# Patient Record
Sex: MALE | Race: WHITE | NOT HISPANIC OR LATINO | Employment: OTHER | URBAN - NONMETROPOLITAN AREA
[De-identification: names, ages, dates, MRNs, and addresses within clinical notes are randomized per-mention and may not be internally consistent; named-entity substitution may affect disease eponyms.]

---

## 2023-10-20 ENCOUNTER — APPOINTMENT (INPATIENT)
Dept: NON INVASIVE DIAGNOSTICS | Facility: HOSPITAL | Age: 83
DRG: 291 | End: 2023-10-20
Payer: COMMERCIAL

## 2023-10-20 ENCOUNTER — APPOINTMENT (EMERGENCY)
Dept: RADIOLOGY | Facility: HOSPITAL | Age: 83
DRG: 291 | End: 2023-10-20
Payer: COMMERCIAL

## 2023-10-20 ENCOUNTER — APPOINTMENT (EMERGENCY)
Dept: CT IMAGING | Facility: HOSPITAL | Age: 83
DRG: 291 | End: 2023-10-20
Payer: COMMERCIAL

## 2023-10-20 ENCOUNTER — APPOINTMENT (INPATIENT)
Dept: CT IMAGING | Facility: HOSPITAL | Age: 83
DRG: 291 | End: 2023-10-20
Payer: COMMERCIAL

## 2023-10-20 ENCOUNTER — APPOINTMENT (INPATIENT)
Dept: RADIOLOGY | Facility: HOSPITAL | Age: 83
DRG: 291 | End: 2023-10-20
Payer: COMMERCIAL

## 2023-10-20 ENCOUNTER — HOSPITAL ENCOUNTER (INPATIENT)
Facility: HOSPITAL | Age: 83
LOS: 7 days | Discharge: HOSPICE | DRG: 291 | End: 2023-10-27
Attending: EMERGENCY MEDICINE | Admitting: INTERNAL MEDICINE
Payer: COMMERCIAL

## 2023-10-20 DIAGNOSIS — K70.31 ALCOHOLIC CIRRHOSIS OF LIVER WITH ASCITES (HCC): ICD-10-CM

## 2023-10-20 DIAGNOSIS — Z71.89 GOALS OF CARE, COUNSELING/DISCUSSION: ICD-10-CM

## 2023-10-20 DIAGNOSIS — I48.91 ATRIAL FIBRILLATION WITH RVR (HCC): ICD-10-CM

## 2023-10-20 DIAGNOSIS — R09.02 HYPOXIA: ICD-10-CM

## 2023-10-20 DIAGNOSIS — J96.01 ACUTE RESPIRATORY FAILURE WITH HYPOXIA (HCC): ICD-10-CM

## 2023-10-20 DIAGNOSIS — G93.41 ACUTE METABOLIC ENCEPHALOPATHY: ICD-10-CM

## 2023-10-20 DIAGNOSIS — I50.41 ACUTE COMBINED SYSTOLIC AND DIASTOLIC CONGESTIVE HEART FAILURE (HCC): ICD-10-CM

## 2023-10-20 DIAGNOSIS — I48.91 ATRIAL FIBRILLATION WITH RAPID VENTRICULAR RESPONSE (HCC): ICD-10-CM

## 2023-10-20 DIAGNOSIS — M86.671 OTHER CHRONIC OSTEOMYELITIS OF RIGHT FOOT (HCC): ICD-10-CM

## 2023-10-20 DIAGNOSIS — R06.03 RESPIRATORY DISTRESS: ICD-10-CM

## 2023-10-20 DIAGNOSIS — I50.9 CHF (CONGESTIVE HEART FAILURE) (HCC): ICD-10-CM

## 2023-10-20 DIAGNOSIS — R60.1 ANASARCA: Primary | ICD-10-CM

## 2023-10-20 PROBLEM — E11.29 TYPE 2 DIABETES MELLITUS WITH RENAL COMPLICATION (HCC): Status: ACTIVE | Noted: 2023-10-20

## 2023-10-20 PROBLEM — N18.30 STAGE 3 CHRONIC KIDNEY DISEASE (HCC): Status: ACTIVE | Noted: 2023-10-20

## 2023-10-20 PROBLEM — D49.4 BLADDER TUMOR: Status: ACTIVE | Noted: 2023-10-20

## 2023-10-20 PROBLEM — L97.519 RIGHT FOOT ULCER (HCC): Status: ACTIVE | Noted: 2023-10-20

## 2023-10-20 LAB
2HR DELTA HS TROPONIN: -19 NG/L
2HR DELTA HS TROPONIN: -60 NG/L
ALBUMIN SERPL BCP-MCNC: 3.8 G/DL (ref 3.5–5)
ALP SERPL-CCNC: 105 U/L (ref 34–104)
ALT SERPL W P-5'-P-CCNC: 15 U/L (ref 7–52)
AMMONIA PLAS-SCNC: 24 UMOL/L (ref 18–72)
ANION GAP SERPL CALCULATED.3IONS-SCNC: 12 MMOL/L
AORTIC ROOT: 3.7 CM
AORTIC VALVE MEAN VELOCITY: 6.2 M/S
APICAL FOUR CHAMBER EJECTION FRACTION: 41 %
APTT PPP: 25 SECONDS (ref 23–37)
APTT PPP: 26 SECONDS (ref 23–37)
ASCENDING AORTA: 3.4 CM
AST SERPL W P-5'-P-CCNC: 26 U/L (ref 13–39)
AV AREA BY CONTINUOUS VTI: 3.6 CM2
AV AREA PEAK VELOCITY: 2.1 CM2
AV LVOT MEAN GRADIENT: 0 MMHG
AV LVOT PEAK GRADIENT: 1 MMHG
AV MEAN GRADIENT: 2 MMHG
AV PEAK GRADIENT: 3 MMHG
AV VALVE AREA: 3.59 CM2
AV VELOCITY RATIO: 0.52
BACTERIA UR QL AUTO: ABNORMAL /HPF
BASE EX.OXY STD BLDV CALC-SCNC: 77.2 % (ref 60–80)
BASE EXCESS BLDV CALC-SCNC: -2.6 MMOL/L
BASOPHILS # BLD AUTO: 0.02 THOUSANDS/ÂΜL (ref 0–0.1)
BASOPHILS NFR BLD AUTO: 0 % (ref 0–1)
BILIRUB SERPL-MCNC: 1.48 MG/DL (ref 0.2–1)
BILIRUB UR QL STRIP: ABNORMAL
BNP SERPL-MCNC: 3855 PG/ML (ref 0–100)
BUN SERPL-MCNC: 44 MG/DL (ref 5–25)
CALCIUM SERPL-MCNC: 9.2 MG/DL (ref 8.4–10.2)
CARDIAC TROPONIN I PNL SERPL HS: 110 NG/L
CARDIAC TROPONIN I PNL SERPL HS: 123 NG/L
CARDIAC TROPONIN I PNL SERPL HS: 63 NG/L
CARDIAC TROPONIN I PNL SERPL HS: 91 NG/L
CHLORIDE SERPL-SCNC: 102 MMOL/L (ref 96–108)
CLARITY UR: CLEAR
CO2 SERPL-SCNC: 25 MMOL/L (ref 21–32)
COLOR UR: YELLOW
CREAT SERPL-MCNC: 1.88 MG/DL (ref 0.6–1.3)
DOP CALC AO PEAK VEL: 0.93 M/S
DOP CALC AO VTI: 14.41 CM
DOP CALC LVOT AREA: 4.15 CM2
DOP CALC LVOT CARDIAC INDEX: 2.03 L/MIN/M2
DOP CALC LVOT CARDIAC OUTPUT: 4.6 L/MIN
DOP CALC LVOT DIAMETER: 2.3 CM
DOP CALC LVOT PEAK VEL VTI: 12.46 CM
DOP CALC LVOT PEAK VEL: 0.48 M/S
DOP CALC LVOT STROKE INDEX: 22.9 ML/M2
DOP CALC LVOT STROKE VOLUME: 51.74
EOSINOPHIL # BLD AUTO: 0.15 THOUSAND/ÂΜL (ref 0–0.61)
EOSINOPHIL NFR BLD AUTO: 2 % (ref 0–6)
ERYTHROCYTE [DISTWIDTH] IN BLOOD BY AUTOMATED COUNT: 16.4 % (ref 11.6–15.1)
ERYTHROCYTE [DISTWIDTH] IN BLOOD BY AUTOMATED COUNT: 16.4 % (ref 11.6–15.1)
EST. AVERAGE GLUCOSE BLD GHB EST-MCNC: 169 MG/DL
FLUAV RNA RESP QL NAA+PROBE: NEGATIVE
FLUBV RNA RESP QL NAA+PROBE: NEGATIVE
FRACTIONAL SHORTENING: 5 (ref 28–44)
GFR SERPL CREATININE-BSD FRML MDRD: 31 ML/MIN/1.73SQ M
GLUCOSE SERPL-MCNC: 145 MG/DL (ref 65–140)
GLUCOSE SERPL-MCNC: 154 MG/DL (ref 65–140)
GLUCOSE SERPL-MCNC: 163 MG/DL (ref 65–140)
GLUCOSE SERPL-MCNC: 190 MG/DL (ref 65–140)
GLUCOSE UR STRIP-MCNC: NEGATIVE MG/DL
HBA1C MFR BLD: 7.5 %
HCO3 BLDV-SCNC: 22.6 MMOL/L (ref 24–30)
HCT VFR BLD AUTO: 51.1 % (ref 36.5–49.3)
HCT VFR BLD AUTO: 55.7 % (ref 36.5–49.3)
HGB BLD-MCNC: 15.8 G/DL (ref 12–17)
HGB BLD-MCNC: 17.1 G/DL (ref 12–17)
HGB UR QL STRIP.AUTO: NEGATIVE
HYALINE CASTS #/AREA URNS LPF: ABNORMAL /LPF
IMM GRANULOCYTES # BLD AUTO: 0.05 THOUSAND/UL (ref 0–0.2)
IMM GRANULOCYTES NFR BLD AUTO: 1 % (ref 0–2)
INR PPP: 1.24 (ref 0.84–1.19)
INR PPP: 1.35 (ref 0.84–1.19)
INTERVENTRICULAR SEPTUM IN DIASTOLE (PARASTERNAL SHORT AXIS VIEW): 0.9 CM
INTERVENTRICULAR SEPTUM: 0.9 CM (ref 0.6–1.1)
KETONES UR STRIP-MCNC: NEGATIVE MG/DL
LAAS-AP2: 25 CM2
LAAS-AP4: 22.8 CM2
LACTATE SERPL-SCNC: 2.2 MMOL/L (ref 0.5–2)
LACTATE SERPL-SCNC: 4 MMOL/L (ref 0.5–2)
LEFT ATRIUM SIZE: 5.7 CM
LEFT ATRIUM VOLUME (MOD BIPLANE): 80 ML
LEFT ATRIUM VOLUME INDEX (MOD BIPLANE): 35.2 ML/M2
LEFT INTERNAL DIMENSION IN SYSTOLE: 5.3 CM (ref 2.1–4)
LEFT VENTRICLE DIASTOLIC VOLUME (MOD BIPLANE): 143 ML
LEFT VENTRICLE SYSTOLIC VOLUME (MOD BIPLANE): 96 ML
LEFT VENTRICULAR INTERNAL DIMENSION IN DIASTOLE: 5.6 CM (ref 3.5–6)
LEFT VENTRICULAR POSTERIOR WALL IN END DIASTOLE: 1 CM
LEFT VENTRICULAR STROKE VOLUME: 21 ML
LEUKOCYTE ESTERASE UR QL STRIP: NEGATIVE
LV EF: 33 %
LVSV (TEICH): 21 ML
LYMPHOCYTES # BLD AUTO: 1.2 THOUSANDS/ÂΜL (ref 0.6–4.47)
LYMPHOCYTES NFR BLD AUTO: 13 % (ref 14–44)
MAGNESIUM SERPL-MCNC: 2.5 MG/DL (ref 1.9–2.7)
MCH RBC QN AUTO: 30.5 PG (ref 26.8–34.3)
MCH RBC QN AUTO: 30.8 PG (ref 26.8–34.3)
MCHC RBC AUTO-ENTMCNC: 30.7 G/DL (ref 31.4–37.4)
MCHC RBC AUTO-ENTMCNC: 30.9 G/DL (ref 31.4–37.4)
MCV RBC AUTO: 100 FL (ref 82–98)
MCV RBC AUTO: 100 FL (ref 82–98)
MITRAL REGURGITATION PEAK VELOCITY: 4.58 M/S
MITRAL VALVE MEAN INFLOW VELOCITY: 3.42 M/S
MITRAL VALVE REGURGITANT PEAK GRADIENT: 84 MMHG
MONOCYTES # BLD AUTO: 0.64 THOUSAND/ÂΜL (ref 0.17–1.22)
MONOCYTES NFR BLD AUTO: 7 % (ref 4–12)
MV STENOSIS PRESSURE HALF TIME: 76 MS
MV VALVE AREA P 1/2 METHOD: 2.89
NEUTROPHILS # BLD AUTO: 6.96 THOUSANDS/ÂΜL (ref 1.85–7.62)
NEUTS SEG NFR BLD AUTO: 77 % (ref 43–75)
NITRITE UR QL STRIP: NEGATIVE
NON-SQ EPI CELLS URNS QL MICRO: ABNORMAL /HPF
NRBC BLD AUTO-RTO: 0 /100 WBCS
O2 CT BLDV-SCNC: 16.8 ML/DL
PCO2 BLDV: 40.5 MM HG (ref 42–50)
PH BLDV: 7.36 [PH] (ref 7.3–7.4)
PH UR STRIP.AUTO: 5 [PH]
PISA MRMAX VEL: 0.26 M/S
PISA RADIUS: 0.8 CM
PLATELET # BLD AUTO: 118 THOUSANDS/UL (ref 149–390)
PLATELET # BLD AUTO: 141 THOUSANDS/UL (ref 149–390)
PMV BLD AUTO: 12.3 FL (ref 8.9–12.7)
PMV BLD AUTO: 12.4 FL (ref 8.9–12.7)
PO2 BLDV: 45.5 MM HG (ref 35–45)
POTASSIUM SERPL-SCNC: 5.2 MMOL/L (ref 3.5–5.3)
PROCALCITONIN SERPL-MCNC: 0.21 NG/ML
PROT SERPL-MCNC: 6.7 G/DL (ref 6.4–8.4)
PROT UR STRIP-MCNC: ABNORMAL MG/DL
PROTHROMBIN TIME: 15.9 SECONDS (ref 11.6–14.5)
PROTHROMBIN TIME: 17.1 SECONDS (ref 11.6–14.5)
PV PEAK GRADIENT: 1 MMHG
QRS AXIS: -67 DEGREES
QRSD INTERVAL: 140 MS
QT INTERVAL: 344 MS
QTC INTERVAL: 517 MS
RA PRESSURE ESTIMATED: 15 MMHG
RBC # BLD AUTO: 5.13 MILLION/UL (ref 3.88–5.62)
RBC # BLD AUTO: 5.6 MILLION/UL (ref 3.88–5.62)
RBC #/AREA URNS AUTO: ABNORMAL /HPF
RIGHT ATRIUM AREA SYSTOLE A4C: 33.6 CM2
RIGHT VENTRICLE ID DIMENSION: 4.9 CM
RSV RNA RESP QL NAA+PROBE: NEGATIVE
RV PSP: 65 MMHG
SARS-COV-2 RNA RESP QL NAA+PROBE: NEGATIVE
SL CV DOP CALC MV VTI RETROGRADE: 136.5 CM
SL CV LEFT ATRIUM LENGTH A2C: 6.4 CM
SL CV MV MEAN GRADIENT RETROGRADE: 54 MMHG
SL CV PED ECHO LEFT VENTRICLE DIASTOLIC VOLUME (MOD BIPLANE) 2D: 155 ML
SL CV PED ECHO LEFT VENTRICLE SYSTOLIC VOLUME (MOD BIPLANE) 2D: 134 ML
SODIUM SERPL-SCNC: 139 MMOL/L (ref 135–147)
SP GR UR STRIP.AUTO: 1.02 (ref 1–1.03)
T WAVE AXIS: 95 DEGREES
TR MAX PG: 50 MMHG
TR PEAK VELOCITY: 3.5 M/S
TRICUSPID ANNULAR PLANE SYSTOLIC EXCURSION: 0.7 CM
TRICUSPID VALVE PEAK REGURGITATION VELOCITY: 3.54 M/S
URINE COMMENT: ABNORMAL
UROBILINOGEN UR QL STRIP.AUTO: 0.2 E.U./DL
VENTRICULAR RATE: 136 BPM
WBC # BLD AUTO: 8.99 THOUSAND/UL (ref 4.31–10.16)
WBC # BLD AUTO: 9.02 THOUSAND/UL (ref 4.31–10.16)
WBC #/AREA URNS AUTO: ABNORMAL /HPF

## 2023-10-20 PROCEDURE — 86704 HEP B CORE ANTIBODY TOTAL: CPT | Performed by: INTERNAL MEDICINE

## 2023-10-20 PROCEDURE — 36415 COLL VENOUS BLD VENIPUNCTURE: CPT | Performed by: PHYSICIAN ASSISTANT

## 2023-10-20 PROCEDURE — 80053 COMPREHEN METABOLIC PANEL: CPT | Performed by: PHYSICIAN ASSISTANT

## 2023-10-20 PROCEDURE — G1004 CDSM NDSC: HCPCS

## 2023-10-20 PROCEDURE — 93970 EXTREMITY STUDY: CPT

## 2023-10-20 PROCEDURE — 96375 TX/PRO/DX INJ NEW DRUG ADDON: CPT

## 2023-10-20 PROCEDURE — 70450 CT HEAD/BRAIN W/O DYE: CPT

## 2023-10-20 PROCEDURE — 87340 HEPATITIS B SURFACE AG IA: CPT | Performed by: INTERNAL MEDICINE

## 2023-10-20 PROCEDURE — 85730 THROMBOPLASTIN TIME PARTIAL: CPT | Performed by: INTERNAL MEDICINE

## 2023-10-20 PROCEDURE — 99285 EMERGENCY DEPT VISIT HI MDM: CPT

## 2023-10-20 PROCEDURE — 81001 URINALYSIS AUTO W/SCOPE: CPT | Performed by: PHYSICIAN ASSISTANT

## 2023-10-20 PROCEDURE — 83036 HEMOGLOBIN GLYCOSYLATED A1C: CPT | Performed by: INTERNAL MEDICINE

## 2023-10-20 PROCEDURE — 86803 HEPATITIS C AB TEST: CPT | Performed by: INTERNAL MEDICINE

## 2023-10-20 PROCEDURE — 85025 COMPLETE CBC W/AUTO DIFF WBC: CPT | Performed by: PHYSICIAN ASSISTANT

## 2023-10-20 PROCEDURE — 71275 CT ANGIOGRAPHY CHEST: CPT

## 2023-10-20 PROCEDURE — 99285 EMERGENCY DEPT VISIT HI MDM: CPT | Performed by: PHYSICIAN ASSISTANT

## 2023-10-20 PROCEDURE — 93971 EXTREMITY STUDY: CPT

## 2023-10-20 PROCEDURE — 82805 BLOOD GASES W/O2 SATURATION: CPT | Performed by: INTERNAL MEDICINE

## 2023-10-20 PROCEDURE — C8929 TTE W OR WO FOL WCON,DOPPLER: HCPCS

## 2023-10-20 PROCEDURE — 83880 ASSAY OF NATRIURETIC PEPTIDE: CPT | Performed by: PHYSICIAN ASSISTANT

## 2023-10-20 PROCEDURE — 73630 X-RAY EXAM OF FOOT: CPT

## 2023-10-20 PROCEDURE — 84145 PROCALCITONIN (PCT): CPT | Performed by: PHYSICIAN ASSISTANT

## 2023-10-20 PROCEDURE — 85610 PROTHROMBIN TIME: CPT | Performed by: PHYSICIAN ASSISTANT

## 2023-10-20 PROCEDURE — 84484 ASSAY OF TROPONIN QUANT: CPT | Performed by: PHYSICIAN ASSISTANT

## 2023-10-20 PROCEDURE — 86705 HEP B CORE ANTIBODY IGM: CPT | Performed by: INTERNAL MEDICINE

## 2023-10-20 PROCEDURE — 96374 THER/PROPH/DIAG INJ IV PUSH: CPT

## 2023-10-20 PROCEDURE — 85027 COMPLETE CBC AUTOMATED: CPT | Performed by: INTERNAL MEDICINE

## 2023-10-20 PROCEDURE — 83735 ASSAY OF MAGNESIUM: CPT | Performed by: PHYSICIAN ASSISTANT

## 2023-10-20 PROCEDURE — 87040 BLOOD CULTURE FOR BACTERIA: CPT | Performed by: PHYSICIAN ASSISTANT

## 2023-10-20 PROCEDURE — 96376 TX/PRO/DX INJ SAME DRUG ADON: CPT

## 2023-10-20 PROCEDURE — 71045 X-RAY EXAM CHEST 1 VIEW: CPT

## 2023-10-20 PROCEDURE — 93005 ELECTROCARDIOGRAM TRACING: CPT

## 2023-10-20 PROCEDURE — 82948 REAGENT STRIP/BLOOD GLUCOSE: CPT

## 2023-10-20 PROCEDURE — 0241U HB NFCT DS VIR RESP RNA 4 TRGT: CPT | Performed by: PHYSICIAN ASSISTANT

## 2023-10-20 PROCEDURE — 74177 CT ABD & PELVIS W/CONTRAST: CPT

## 2023-10-20 PROCEDURE — 85730 THROMBOPLASTIN TIME PARTIAL: CPT | Performed by: PHYSICIAN ASSISTANT

## 2023-10-20 PROCEDURE — 85610 PROTHROMBIN TIME: CPT | Performed by: INTERNAL MEDICINE

## 2023-10-20 PROCEDURE — 1124F ACP DISCUSS-NO DSCNMKR DOCD: CPT | Performed by: PHYSICIAN ASSISTANT

## 2023-10-20 PROCEDURE — 84484 ASSAY OF TROPONIN QUANT: CPT | Performed by: INTERNAL MEDICINE

## 2023-10-20 PROCEDURE — 99223 1ST HOSP IP/OBS HIGH 75: CPT | Performed by: INTERNAL MEDICINE

## 2023-10-20 PROCEDURE — 82140 ASSAY OF AMMONIA: CPT | Performed by: INTERNAL MEDICINE

## 2023-10-20 PROCEDURE — 83605 ASSAY OF LACTIC ACID: CPT | Performed by: PHYSICIAN ASSISTANT

## 2023-10-20 RX ORDER — CHLORAL HYDRATE 500 MG
1000 CAPSULE ORAL DAILY
COMMUNITY
End: 2023-10-27

## 2023-10-20 RX ORDER — HEPARIN SODIUM 1000 [USP'U]/ML
4000 INJECTION, SOLUTION INTRAVENOUS; SUBCUTANEOUS ONCE
Status: DISCONTINUED | OUTPATIENT
Start: 2023-10-20 | End: 2023-10-20

## 2023-10-20 RX ORDER — POTASSIUM CHLORIDE 20 MEQ/1
20 TABLET, EXTENDED RELEASE ORAL DAILY
Status: DISCONTINUED | OUTPATIENT
Start: 2023-10-20 | End: 2023-10-23

## 2023-10-20 RX ORDER — FUROSEMIDE 10 MG/ML
40 INJECTION INTRAMUSCULAR; INTRAVENOUS 2 TIMES DAILY
Status: DISCONTINUED | OUTPATIENT
Start: 2023-10-20 | End: 2023-10-22

## 2023-10-20 RX ORDER — METOPROLOL TARTRATE 5 MG/5ML
2.5 INJECTION INTRAVENOUS EVERY 6 HOURS PRN
Status: DISCONTINUED | OUTPATIENT
Start: 2023-10-20 | End: 2023-10-20

## 2023-10-20 RX ORDER — HEPARIN SODIUM 1000 [USP'U]/ML
4000 INJECTION, SOLUTION INTRAVENOUS; SUBCUTANEOUS EVERY 6 HOURS PRN
Status: DISCONTINUED | OUTPATIENT
Start: 2023-10-20 | End: 2023-10-20

## 2023-10-20 RX ORDER — UBIDECARENONE 200 MG
200 CAPSULE ORAL DAILY
COMMUNITY
End: 2023-10-27

## 2023-10-20 RX ORDER — INSULIN LISPRO 100 [IU]/ML
1-6 INJECTION, SOLUTION INTRAVENOUS; SUBCUTANEOUS
Status: DISCONTINUED | OUTPATIENT
Start: 2023-10-20 | End: 2023-10-27 | Stop reason: HOSPADM

## 2023-10-20 RX ORDER — METOPROLOL TARTRATE 5 MG/5ML
2.5 INJECTION INTRAVENOUS EVERY 6 HOURS PRN
Status: DISCONTINUED | OUTPATIENT
Start: 2023-10-20 | End: 2023-10-27 | Stop reason: HOSPADM

## 2023-10-20 RX ORDER — GLIPIZIDE 5 MG/1
5 TABLET, FILM COATED, EXTENDED RELEASE ORAL DAILY
COMMUNITY
End: 2023-10-27

## 2023-10-20 RX ORDER — FUROSEMIDE 10 MG/ML
20 INJECTION INTRAMUSCULAR; INTRAVENOUS ONCE
Status: COMPLETED | OUTPATIENT
Start: 2023-10-20 | End: 2023-10-20

## 2023-10-20 RX ORDER — HEPARIN SODIUM 10000 [USP'U]/100ML
3-20 INJECTION, SOLUTION INTRAVENOUS
Status: DISCONTINUED | OUTPATIENT
Start: 2023-10-20 | End: 2023-10-20

## 2023-10-20 RX ORDER — HEPARIN SODIUM 1000 [USP'U]/ML
2000 INJECTION, SOLUTION INTRAVENOUS; SUBCUTANEOUS EVERY 6 HOURS PRN
Status: DISCONTINUED | OUTPATIENT
Start: 2023-10-20 | End: 2023-10-20

## 2023-10-20 RX ORDER — INSULIN LISPRO 100 [IU]/ML
1-5 INJECTION, SOLUTION INTRAVENOUS; SUBCUTANEOUS
Status: DISCONTINUED | OUTPATIENT
Start: 2023-10-20 | End: 2023-10-27 | Stop reason: HOSPADM

## 2023-10-20 RX ORDER — DILTIAZEM HYDROCHLORIDE 5 MG/ML
15 INJECTION INTRAVENOUS ONCE
Status: COMPLETED | OUTPATIENT
Start: 2023-10-20 | End: 2023-10-20

## 2023-10-20 RX ORDER — LOSARTAN POTASSIUM 100 MG/1
100 TABLET ORAL DAILY
COMMUNITY
End: 2023-10-27

## 2023-10-20 RX ORDER — FUROSEMIDE 10 MG/ML
40 INJECTION INTRAMUSCULAR; INTRAVENOUS ONCE
Status: COMPLETED | OUTPATIENT
Start: 2023-10-20 | End: 2023-10-20

## 2023-10-20 RX ADMIN — FUROSEMIDE 40 MG: 10 INJECTION, SOLUTION INTRAMUSCULAR; INTRAVENOUS at 13:46

## 2023-10-20 RX ADMIN — ASPIRIN 81 MG: 81 TABLET, COATED ORAL at 13:47

## 2023-10-20 RX ADMIN — IOHEXOL 100 ML: 350 INJECTION, SOLUTION INTRAVENOUS at 09:58

## 2023-10-20 RX ADMIN — INSULIN LISPRO 1 UNITS: 100 INJECTION, SOLUTION INTRAVENOUS; SUBCUTANEOUS at 22:26

## 2023-10-20 RX ADMIN — FUROSEMIDE 20 MG: 10 INJECTION, SOLUTION INTRAMUSCULAR; INTRAVENOUS at 10:05

## 2023-10-20 RX ADMIN — FUROSEMIDE 40 MG: 10 INJECTION, SOLUTION INTRAMUSCULAR; INTRAVENOUS at 20:54

## 2023-10-20 RX ADMIN — FUROSEMIDE 40 MG: 10 INJECTION, SOLUTION INTRAMUSCULAR; INTRAVENOUS at 09:22

## 2023-10-20 RX ADMIN — METOPROLOL TARTRATE 25 MG: 25 TABLET, FILM COATED ORAL at 13:47

## 2023-10-20 RX ADMIN — POTASSIUM CHLORIDE 20 MEQ: 1500 TABLET, EXTENDED RELEASE ORAL at 13:47

## 2023-10-20 RX ADMIN — PERFLUTREN 2 ML/MIN: 6.52 INJECTION, SUSPENSION INTRAVENOUS at 14:00

## 2023-10-20 RX ADMIN — METOPROLOL TARTRATE 25 MG: 25 TABLET, FILM COATED ORAL at 22:24

## 2023-10-20 RX ADMIN — INSULIN LISPRO 1 UNITS: 100 INJECTION, SOLUTION INTRAVENOUS; SUBCUTANEOUS at 13:58

## 2023-10-20 RX ADMIN — DILTIAZEM HYDROCHLORIDE 15 MG: 5 INJECTION INTRAVENOUS at 10:13

## 2023-10-20 NOTE — H&P
[address] [facility]  H&P  Name: Emely Brown 78 y.o. male I MRN: [id number]  Unit/Bed#: -01 I Date of Admission: 10/20/2023   Date of Service: 10/20/2023 I Hospital Day: 0      Assessment/Plan   * Acute respiratory failure with hypoxia [facility])  Assessment & Plan  On admission with oxygen saturation 85% on room air at rest.  Currently on 3 L of supplemental oxygen via nasal cannula. Wean and titrate as tolerated to keep saturation between 88 to 94%. Likely in the setting of underlying congestive heart failure with large bilateral pleural effusions. May need thoracentesis if continues to have escalating oxygen requirement. Critical care team aware. F/U on Venous blood gas. Acute combined systolic and diastolic congestive heart failure (HCC)  Assessment & Plan  Wt Readings from Last 3 Encounters:   10/20/23 106 kg (234 lb 5.6 oz)         Patient presents with worsening shortness of breath abdominal distention and lower extremity edema. Has no known prior history of congestive heart failure however has however has not seen a physician in 3 years and had stopped taking all his medications since February 2023  CT scan of the chest shows bilateral large pleural effusion, cardiomegaly, large ascites  Wife at bedside reports increased weight gain  Patient hypoxic requiring 3 L of supplemental oxygen via nasal cannula  Will initiate treatment with IV furosemide 40 mg twice daily. Will assess diuretic response and may need titration of dosing accordingly. Continue with daily weight, strict intake output monitoring sodium and fluid restricted diet  Cardiology evaluation will be obtained  Follow-up on echocardiogram  Previous echo in 2022 showed normal left ventricular systolic function with diminished right ventricular systolic function      Right foot ulcer ([address] [facility])  Assessment & Plan   Has chronic ulcer right foot. Has not seen a podiatrist or undergone any imaging.   We will start with foot x-rays and podiatry evaluation. Follow-up on lower extremity arterial Doppler study as patient does have bilateral lower extremity edema and diminished pulses. 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 However he has been off all his medications since February 2023. Follow-up on hemoglobin A1c and start sliding scale insulin. May need titration and adjustment of insulin regimen based on blood glucose control. Bladder tumor  Assessment & Plan  Bladder tumor s/pTURBT in 2020. Has not subsequently followed up with urologist.  Found to have urinary retention. Melchor catheter placed. Urinalysis with microscopy negative for hematuria. Stage 3 chronic kidney disease [facility])  Assessment & Plan  Lab Results   Component Value Date    EGFR 31 10/20/2023    CREATININE 1.88 (H) 10/20/2023   Likely secondary to diabetic nephropathy. Baseline creatinine 1.6. Creatinine 1.88 today. Continue to monitor renal function closely on current diuretic regimen. Patient also appears to have some urinary retention with greater than 500 mL seen on bladder scan. Will initiate Melchor catheter for strict intake output monitoring in the setting of underlying urinary retention. Continue to monitor renal functions closely. Avoid hypotension and nephrotoxic medications. Follow-up on ultrasound. Acute metabolic encephalopathy  Assessment & Plan  Wife reports worsening confusion over the last several weeks. Upon examination patient is oriented to self and month only. No asterixis noted. Neuro exam nonfocal  Likely multifactorial in the setting of hypoxia related to heart failure.   However in view of new onset atrial fibrillation cannot rule out acute CVA or hyperammonemia in the setting of cirrhosis seen on imaging. Continue with serial neurochecks  Follow-up on CT head  Check ammonia     Atrial fibrillation with RVR ([address])  Assessment & Plan  Present admission with heart rate in the 120s. Received 1 dose of IV Cardizem in the emergency room. Subsequently heart rate varying between 100-1 10. Continue with telemetry monitoring  Initiate Lopressor 25mg twice daily  [id number] score 4  Discussed with wife about anticoagulation. She is agreeable. Will initiate heparin gtt. Alcoholic cirrhosis of liver with ascites [facility])  Assessment & Plan  Seen on CT scan of the abdomen. Associated with large ascites. Will need paracentesis. 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 Follow-up on abdominal ultrasound  Follow-up on chronic hepatitis panel  Wife reported some confusion. Follow-up on ammonia  Will need GI follow-up           VTE Pharmacologic Prophylaxis: VTE Score: 5 High Risk (Score >/= 5) - Pharmacological DVT Prophylaxis Ordered: heparin drip. Sequential Compression Devices Ordered. Code Status: Level 3 - DNAR and DNI   Discussion with family: Updated  (wife) at bedside. Anticipated Length of Stay: Patient will be admitted on an inpatient basis with an anticipated length of stay of greater than 2 midnights secondary to ongoing management of newly diagnosed atrial fibrillation, heart failure. Total Time Spent on Date of Encounter in care of patient: 60 mins. This time was spent on one or more of the following: performing physical exam; counseling and coordination of care; obtaining or reviewing history; documenting in the medical record; reviewing/ordering tests, medications or procedures; communicating with other healthcare professionals and discussing with patient's family/caregivers.     Chief Complaint: Shortness of breath     History of Present Illness:  Rios Sotomayor is a 78 y.o. male with a PMH of  DM/bladder tumor s/p resection who presents with several months of weight gain, worsening shortness of breath, abdominal distention, lower extremity edema. Wife reports that patient had stopped taking all his medications since February 2023 and has not seen a physician in over 3 years. Also stated that patient has been increasingly weak and has been bedridden for over 2 weeks. She also noticed that he has been increasingly confused and has been having visual and auditory hallucinations. Patient himself denies any chest pain or cough. Wife denies any fever or chills. Denies any abdominal pain, constipation, diarrhea. No headache, blurring of vision or focal weakness of any extremity was noted by patient or wife. .    Review of Systems:  Review of Systems   Constitutional:  Positive for activity change, appetite change and fatigue. HENT: Negative. Eyes: Negative. Respiratory:  Positive for shortness of breath. Cardiovascular:  Positive for leg swelling. Gastrointestinal:  Positive for abdominal distention. Genitourinary:  Positive for decreased urine volume. Musculoskeletal:  Positive for gait problem and myalgias. Skin: Negative. Neurological:  Positive for weakness. Hematological: Negative. Psychiatric/Behavioral:  Positive for confusion. Past Medical and Surgical History:   Past Medical History:   Diagnosis Date    Bladder cancer ([address] )     MI (myocardial infarction) ([address])     Prostate cancer ([address] )        No past surgical history on file. Meds/Allergies:  Prior to Admission medications    Not on File     I have reviewed home medications with patient personally.     Allergies: No Known Allergies    Social History:  Marital Status: /Civil Union   Substance Use History:   Social History     Substance and Sexual Activity   Alcohol Use Not on file     Social History     Tobacco Use   Smoking Status Not on file Smokeless Tobacco Not on file     Social History     Substance and Sexual Activity   Drug Use Not on file       Family History:  No family history on file. Physical Exam:     Vitals:   Blood Pressure: 125/86 (10/20/23 1200)  Pulse: (!) 120 (10/20/23 1200)  Temperature: 97.6 °F (36.4 °C) (10/20/23 0911)  Temp Source: Temporal (10/20/23 0911)  Respirations: 12 (10/20/23 1200)  Height: 6' 3" (190.5 cm) (10/20/23 0911)  Weight - Scale: 98.6 kg (217 lb 6 oz) (10/20/23 1244)  SpO2: 99 % (10/20/23 1200)    Physical Exam  Constitutional:       Appearance: He is obese. He is ill-appearing. HENT:      Head: Normocephalic. Nose: Nose normal.      Mouth/Throat:      Mouth: Mucous membranes are moist.   Eyes:      Extraocular Movements: Extraocular movements intact. Pupils: Pupils are equal, round, and reactive to light. Cardiovascular:      Rate and Rhythm: Tachycardia present. Rhythm irregular. Heart sounds: Murmur heard. Pulmonary:      Effort: Pulmonary effort is normal.      Comments: Diminished breath sounds bilaterally  Abdominal:      General: Abdomen is flat. Bowel sounds are normal.      Palpations: Abdomen is soft. Musculoskeletal:      Cervical back: Neck supple. Comments: Bilateral 3+ lower extremity edema with diminished pulses lower extremity   Skin:     General: Skin is warm. Neurological:      General: No focal deficit present. Mental Status: He is alert. Comments: Oriented to self and month only. Able to move all extremities spontaneously. No slurred speech or facial droop visualized.    Psychiatric:         Mood and Affect: Mood normal.          Additional Data:     Lab Results:  Results from last 7 days   Lab Units 10/20/23  0923   WBC Thousand/uL 9.02   HEMOGLOBIN g/dL 17.1*   HEMATOCRIT % 55.7*   PLATELETS Thousands/uL 141*   NEUTROS PCT % 77*   LYMPHS PCT % 13*   MONOS PCT % 7   EOS PCT % 2     Results from last 7 days   Lab Units 10/20/23  0923   SODIUM mmol/L 139   POTASSIUM mmol/L 5.2   CHLORIDE mmol/L 102   CO2 mmol/L 25   BUN mg/dL 44*   CREATININE mg/dL 1.88*   ANION GAP mmol/L 12   CALCIUM mg/dL 9.2   ALBUMIN g/dL 3.8   TOTAL BILIRUBIN mg/dL 1.48*   ALK PHOS U/L 105*   ALT U/L 15   AST U/L 26   GLUCOSE RANDOM mg/dL 190*     Results from last 7 days   Lab Units 10/20/23  0923   INR  1.35*     Results from last 7 days   Lab Units 10/20/23  1248   POC GLUCOSE mg/dl 163*         Results from last 7 days   Lab Units 10/20/23  1158 10/20/23  0923   LACTIC ACID mmol/L 2.2* 4.0*   PROCALCITONIN ng/ml  --  0.21       Lines/Drains:  Invasive Devices       Peripheral Intravenous Line  Duration             Peripheral IV 10/20/23 Distal;Right;Upper;Ventral (anterior) Arm <1 day                        Imaging: Reviewed radiology reports from this admission including: abdominal/pelvic CT  PE Study with CT Abdomen and Pelvis with contrast   Final Result by Melissa Mccord MD (10/20 7956)      No pulmonary embolism. Cardiomegaly with moderate to large bilateral pleural effusions. Reflux of contrast into the inferior vena cava and portal veins, suggestive of right heart dysfunction. Cirrhosis with sequela of portal hypertension including moderate to large abdominopelvic ascites. No suspicious hepatic mass identified. Diffuse anasarca and asymmetric left chest wall edema. Workstation performed: [id number]         XR chest 1 view portable   Final Result by Cecelia Smith MD (10/20 6382)      Low lung volumes with vascular crowding. Question pulmonary venous congestion with small effusions and probable left base atelectasis. Question right midlung nodule. Recommend further evaluation with PA and lateral chest radiographs with dual energy subtraction when the patient is able. This study was marked in Epic for immediate notification and follow-up.                Workstation performed: [id number]         US abdomen complete    (Results Pending) VAS lower limb venous duplex study, complete bilateral    (Results Pending)   VAS lower limb arterial duplex, complete bilateral    (Results Pending)   XR foot 3+ vw right    (Results Pending)   CT head wo contrast    (Results Pending)       EKG and Other Studies Reviewed on Admission:   EKG: Atrial fibrillation. .    ** Please Note: This note has been constructed using a voice recognition system.  **

## 2023-10-20 NOTE — ED NOTES
Assumed care of patient.  Received report from Azam Waddell6 Jorge Luis [place] Latanya, ELIAS  10/20/23 9734

## 2023-10-20 NOTE — ASSESSMENT & PLAN NOTE
Seen on CT scan of the abdomen. Associated with large ascites. Will need paracentesis. 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 Follow-up on abdominal ultrasound  Follow-up on chronic hepatitis panel  Wife reported some confusion.   Follow-up on ammonia  Will need GI follow-up

## 2023-10-20 NOTE — ASSESSMENT & PLAN NOTE
On admission with oxygen saturation 85% on room air at rest.  Currently on 3 L of supplemental oxygen via nasal cannula. Wean and titrate as tolerated to keep saturation between 88 to 94%. Likely in the setting of underlying congestive heart failure with large bilateral pleural effusions. May need thoracentesis if continues to have escalating oxygen requirement. Critical care team aware. F/U on Venous blood gas.

## 2023-10-20 NOTE — CONSULTS
Consultation - Cardiology   Bairon Ceballos 78 y.o. male MRN: [id number]  Unit/Bed#: -01 Encounter: [id number]    Assessment/Plan     Assessment:  Acute hypoxic respiratory failure  Acute on chronic decompensated HFrEF - warm and wet by exam  DCM - severe biventricular systolic dysfunction - new finding   - normal LVEF 2011 and 2020       -  most likely tachy-mediated but given his h/o CAD will need to exclude progressive CAD   Severe MR- secondary to DCM   - mild MR 2020  Large bilateral pleural effusions  Hepatic cirrhosis of unclear etiology with portal hypertension ? EtOH related   - large ascites with anasarca  Acute metabolic encephalopathy  Lactic Acidosis  Perm AF with RVR   - failed DCCV in the past  CAD remote h/o PCI   - PCI of LAD, LCX and RCA [address]   - NSTEMI 2011 - PCI of OM with BMS  Remote h/o EtOH abuse  Medication non-compliance since Feb 2023    Plan:   - start Lasix gtt at 10 mg/hr, depending on results could consider prn Diuril 1000 mg IVP daily for diuretic augmentation   - follow I/O, wts   - bid BMP with K and Mg replacement   - Consider thoracentesis if no significant improvement in pleural effusions with diuretics   - Lopressor for HR control, can increase to 25 mb qid if BP can tolerate. - Avoid CCB given low LVEF, would not use dig given CKD   - IV Heparin for [address] on Coumadin PTA   - consider paracentesis, GI evaluation   - Given comorbidities he is not a candidate for advance HF therapies    - pending clinic course, will need to add GDMT and consider further CM evaluation to include ischemic assessment. - would address [address] [facility] with family and consider Palliative Care Consult if available      History of Present Illness     Physician Requesting Consult: Jeanne Hunt MD  Reason for Consult / Principal Problem: Afib RVR    HPI: Bairon Ceballos is a 78y.o. year old male who presents with increase wt gain, SOB and edema over the past few months.  He stopping seeing his PCP about 3 years ago and stopped taking all of his meds back in Feb 2023. Wife is present at the bedside to offer additional history as the patient is somewhat confused. He has a h/o CAD with remote PCI and hasn't seen a Cardiologist in many years. Also has a h/o perm AF on Coumadin, T2DM, prostate cancer s/p resection, remote h/o EtOH abuse. Wife notes that he has been increasingly confused and not himself. No chest pains but has progressive SOB/AHUMADA. No falls or syncope reported. In the ED noted to be in AF RVR. CT PE shows large pleural effusions, hepatic cirrhosis with ascites. Given one dose of IV Cardizem for HR control. Admitted to the ICU    Inpatient consult to Cardiology  Consult performed by: Dwight Sylvester MD  Consult ordered by: David Land MD          Review of Systems   Constitutional:  Positive for activity change, appetite change, fatigue and unexpected weight change. Respiratory:  Positive for shortness of breath. Cardiovascular:  Positive for leg swelling. Negative for chest pain and palpitations. Gastrointestinal:  Positive for abdominal distention. Neurological:  Positive for weakness. Psychiatric/Behavioral:  Positive for confusion. Historical Information   Past Medical History:   Diagnosis Date    Bladder cancer ([address])     MI (myocardial infarction) ([address])     Prostate cancer ([address])      No past surgical history on file.   Social History     Substance and Sexual Activity   Alcohol Use Not on file     Social History     Substance and Sexual Activity   Drug Use Not on file     E-Cigarette/Vaping     E-Cigarette/Vaping Substances     Social History     Tobacco Use   Smoking Status Not on file   Smokeless Tobacco Not on file     Family History: non-contributory    Meds/Allergies   all current active meds have been reviewed  No Known Allergies    Objective   Vitals: Blood pressure 125/86, pulse (!) 106, temperature 97.6 °F (36.4 °C), temperature source Temporal, resp. rate (!) 9, height 6' 3" (1.905 m), weight 98.6 kg (217 lb 6 oz), SpO2 99 %. Orthostatic Blood Pressures      Flowsheet Row Most Recent Value   Blood Pressure 125/86 filed at 10/20/2023 1338   Patient Position - Orthostatic VS Lying filed at 10/20/2023 1200              Intake/Output Summary (Last 24 hours) at 10/20/2023 1417  Last data filed at 10/20/2023 1401  Gross per 24 hour   Intake --   Output 4650 ml   Net -4650 ml       Invasive Devices       Peripheral Intravenous Line  Duration             Long-Dwell Peripheral IV (Midline) 70/68/76 Right Cephalic Vein <1 day    Peripheral IV 10/20/23 Distal;Right;Upper;Ventral (anterior) Arm <1 day    Peripheral IV 10/20/23 Dorsal (posterior); Left Wrist <1 day              Drain  Duration             Urethral Catheter 18 Fr. <1 day                    Physical Exam  Constitutional:       Appearance: He is ill-appearing. Cardiovascular:      Rate and Rhythm: Tachycardia present. Rhythm irregular. Heart sounds: Normal heart sounds. No murmur heard. Comments: 3+ LE edema   Pulmonary:      Effort: Pulmonary effort is normal.      Breath sounds: Decreased breath sounds and rales present. No wheezing. Abdominal:      General: There is distension. Palpations: There is fluid wave. Tenderness: There is no abdominal tenderness. Skin:     General: Skin is warm and dry. Comments: Generalized anasarca   Neurological:      General: No focal deficit present. Mental Status: He is alert. Comments: Oriented x 1         Lab Results: I have personally reviewed pertinent lab results.     CBC with diff:   Results from last 7 days   Lab Units 10/20/23  1326   WBC Thousand/uL 8.99   RBC Million/uL 5.13   HEMOGLOBIN g/dL 15.8   HEMATOCRIT % 51.1*   MCV fL 100*   MCH pg 30.8   MCHC g/dL 30.9*   RDW % 16.4*   MPV fL 12.4   PLATELETS Thousands/uL 118*     CMP:   Results from last 7 days   Lab Units 10/20/23  0923   SODIUM mmol/L 139   CHLORIDE mmol/L 102   CO2 mmol/L 25   BUN mg/dL 44*   CREATININE mg/dL 1.88*   CALCIUM mg/dL 9.2   AST U/L 26   ALT U/L 15   ALK PHOS U/L 105*   EGFR ml/min/1.73sq m 31     HS Troponin:   0   Lab Value Date/Time    HSTNI0 123 (H) 10/20/2023 1342    HSTNI2 91 (H) 10/20/2023 1028     BNP:   Results from last 7 days   Lab Units 10/20/23  0923   POTASSIUM mmol/L 5.2   CHLORIDE mmol/L 102   CO2 mmol/L 25   BUN mg/dL 44*   CREATININE mg/dL 1.88*   CALCIUM mg/dL 9.2   EGFR ml/min/1.73sq m 31     Coags:   Results from last 7 days   Lab Units 10/20/23  1342   PTT seconds 26   INR  1.24*     TSH:     Magnesium:   Results from last 7 days   Lab Units 10/20/23  0923   MAGNESIUM mg/dL 2.5     Lipid Profile:     \  Imaging: I have personally reviewed pertinent reports. TTE LVH 12/21/2020  Left ventricle normal systolic function ejection fraction is 58%   Left atrium enlarged   Right atrium enlarged   Right ventricle decreased systolic function   Mitral valve mild mitral regurgitation   Aortic valve unremarkable   Tricuspid valve tricuspid regurgitation pulmonary artery systolic pressure   is normal at 27   Pulmonary valve unremarkable   Pericardium unremarkable   Aortic root unremarkable   Rhythm strip throughout the tracing shows atrial fibrillation     CT PE10/20/23  IMPRESSION:     No pulmonary embolism. Cardiomegaly with moderate to large bilateral pleural effusions. Reflux of contrast into the inferior vena cava and portal veins, suggestive of right heart dysfunction. Cirrhosis with sequela of portal hypertension including moderate to large abdominopelvic ascites. No suspicious hepatic mass identified. Diffuse anasarca and asymmetric left chest wall edema. EKG:AF with RVR    Code Status: Level 3 - DNAR and DNI  Advance Directive and Living Will:      Power of :    POLST:      Counseling / Coordination of Care  Total floor / unit time spent today 20 minutes.   Greater than 50% of total time was spent with the patient and / or family counseling and / or coordination of care. A description of the counseling / coordination of care: Echo reviewed, case d/w Hospital Medicine and CCM.

## 2023-10-20 NOTE — ASSESSMENT & PLAN NOTE
Has chronic ulcer right foot. Has not seen a podiatrist or undergone any imaging. We will start with foot x-rays and podiatry evaluation. Follow-up on lower extremity arterial Doppler study as patient does have bilateral lower extremity edema and diminished pulses.

## 2023-10-20 NOTE — PLAN OF CARE
Patient rec'd from ED via stretcher, transferred to ICU bed, monitoring equipment applied. 2 RN skin check done, multiple areas of breakdown noted and photographed. Cards consult and head CT completed. Continue to monitor.

## 2023-10-20 NOTE — ASSESSMENT & PLAN NOTE
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 However he has been off all his medications since February 2023. Follow-up on hemoglobin A1c and start sliding scale insulin. May need titration and adjustment of insulin regimen based on blood glucose control.

## 2023-10-20 NOTE — ASSESSMENT & PLAN NOTE
Present admission with heart rate in the 120s. Received 1 dose of IV Cardizem in the emergency room. Subsequently heart rate varying between 100-1 10. Continue with telemetry monitoring  Initiate Lopressor 25mg twice daily  [id number] score 4  Discussed with wife about anticoagulation. She is agreeable. Will initiate heparin gtt.

## 2023-10-20 NOTE — ASSESSMENT & PLAN NOTE
Bladder tumor s/pTURBT in 2020. Has not subsequently followed up with urologist.  Found to have urinary retention. Melchor catheter placed. Urinalysis with microscopy negative for hematuria.

## 2023-10-20 NOTE — ASSESSMENT & PLAN NOTE
Lab Results   Component Value Date    EGFR 31 10/20/2023    CREATININE 1.88 (H) 10/20/2023   Likely secondary to diabetic nephropathy. Baseline creatinine 1.6. Creatinine 1.88 today. Continue to monitor renal function closely on current diuretic regimen. Patient also appears to have some urinary retention with greater than 500 mL seen on bladder scan. Will initiate Melchor catheter for strict intake output monitoring in the setting of underlying urinary retention. Continue to monitor renal functions closely. Avoid hypotension and nephrotoxic medications. Follow-up on ultrasound.

## 2023-10-20 NOTE — ASSESSMENT & PLAN NOTE
Wife reports worsening confusion over the last several weeks. Upon examination patient is oriented to self and month only. No asterixis noted. Neuro exam nonfocal  Likely multifactorial in the setting of hypoxia related to heart failure. However in view of new onset atrial fibrillation cannot rule out acute CVA or hyperammonemia in the setting of cirrhosis seen on imaging.   Continue with serial neurochecks  Follow-up on CT head  Check ammonia

## 2023-10-20 NOTE — ED PROVIDER NOTES
History  Chief Complaint   Patient presents with    Shortness of Breath     Pt reports increasing SOB and fluid retention over the last 6 weeks      The patient is an 68-year-old male with a past medical history of ladder cancer and CAD who presents to the emergency department today via EMS from home for the concern of edema and shortness of breath. Wife states that he has not seen a physician for years. The patient not been on any medications over the last 4 years. The patient has been having more swelling and shortness of breath over the last few weeks. Patient upon EMS arrival was 75% on room air. EMS states they gave a neb treatment in route due to slight wheezing. Patient was placed on nasal cannula oxygen and improved significantly. Patient apparently per wife has been refusing medical evaluation for the last few months despite efforts from family. Patient states that he has been sleeping upright in a chair at home and unable to lay flat due to shortness of breath. No recent vomiting, nausea, chest pain, abdominal pain, diarrhea, falls or traumas, headache blurred vision. States he has never used oxygen at home in the past.  No history of COPD or asthma. History provided by:  Spouse, patient and EMS personnel  Shortness of Breath  Severity:  Severe  Onset quality:  Gradual  Duration:  3 weeks  Timing:  Constant  Progression:  Worsening  Chronicity:  New  Relieved by:  Nothing  Worsened by: Activity, coughing and exertion  Ineffective treatments:  Rest, sitting up and position changes  Associated symptoms: no chest pain        None       Past Medical History:   Diagnosis Date    Bladder cancer ([address])     MI (myocardial infarction) ([address])     Prostate cancer ([address])        No past surgical history on file. No family history on file. I have reviewed and agree with the history as documented.     E-Cigarette/Vaping     E-Cigarette/Vaping Substances          Review of Systems   Respiratory: Positive for shortness of breath. Cardiovascular:  Negative for chest pain. All other systems reviewed and are negative. Physical Exam  Physical Exam  Vitals and nursing note reviewed. Constitutional:       General: He is not in acute distress. Appearance: He is well-developed. HENT:      Head: Normocephalic and atraumatic. Eyes:      Extraocular Movements: Extraocular movements intact. Pupils: Pupils are equal, round, and reactive to light. Cardiovascular:      Rate and Rhythm: Tachycardia present. Rhythm irregularly irregular. Pulses: Normal pulses. Heart sounds: No murmur heard. Pulmonary:      Effort: Respiratory distress present. Breath sounds: Examination of the right-lower field reveals rales. Examination of the left-lower field reveals rales. Rales present. No decreased breath sounds. Chest:      Chest wall: No tenderness. Abdominal:      General: Bowel sounds are normal. There is distension. Palpations: Abdomen is soft. Tenderness: There is no abdominal tenderness. There is no right CVA tenderness or left CVA tenderness. Hernia: No hernia is present. Musculoskeletal:      Cervical back: Normal range of motion. Right lower le+ Pitting Edema present. Left lower le+ Pitting Edema present. Skin:     General: Skin is warm and dry. Neurological:      General: No focal deficit present. Mental Status: He is alert and oriented to person, place, and time.    Psychiatric:         Behavior: Behavior normal.         Vital Signs  ED Triage Vitals [10/20/23 0911]   Temperature Pulse Respirations Blood Pressure SpO2   97.6 °F (36.4 °C) 66 20 129/92 (!) 85 %      Temp Source Heart Rate Source Patient Position - Orthostatic VS BP Location FiO2 (%)   Temporal Monitor Sitting Left arm --      Pain Score       No Pain           Vitals:    10/20/23 1015 10/20/23 1030 10/20/23 1100 10/20/23 1200   BP:  131/82 130/80 125/86   Pulse: (!) 125 94 99 (!) 120   Patient Position - Orthostatic VS:  Sitting  Lying         Visual Acuity      ED Medications  Medications   furosemide (LASIX) injection 40 mg (40 mg Intravenous Given 10/20/23 0922)   furosemide (LASIX) injection 20 mg (20 mg Intravenous Given 10/20/23 1005)   iohexol (OMNIPAQUE) 350 MG/ML injection (MULTI-DOSE) 100 mL (100 mL Intravenous Given 10/20/23 0958)   diltiazem (CARDIZEM) injection 15 mg (15 mg Intravenous Given 10/20/23 1013)       Diagnostic Studies  Results Reviewed       Procedure Component Value Units Date/Time    Lactic acid 2 Hours [[id number]] Collected: 10/20/23 1158    Lab Status:  In process Specimen: Blood from Arm, Right Updated: 10/20/23 1206    Urine Microscopic [[id number]]  (Abnormal) Collected: 10/20/23 1045    Lab Status: Final result Specimen: Urine, Other Updated: 10/20/23 1132     RBC, UA None Seen /hpf      WBC, UA None Seen /hpf      Epithelial Cells Occasional /hpf      Bacteria, UA Occasional /hpf      Hyaline Casts, UA 0-1 /lpf      URINE COMMENT Bilirubin crystals- Occasional    HS Troponin I 4hr [[id number]]     Lab Status: No result Specimen: Blood     UA w Reflex to Microscopic w Reflex to Culture [[id number]]  (Abnormal) Collected: 10/20/23 1045    Lab Status: Final result Specimen: Urine, Other Updated: 10/20/23 1108     Color, UA Yellow     Clarity, UA Clear     Specific Gravity, UA 1.020     pH, UA 5.0     Leukocytes, UA Negative     Nitrite, UA Negative     Protein, UA 30 (1+) mg/dl      Glucose, UA Negative mg/dl      Ketones, UA Negative mg/dl      Urobilinogen, UA 0.2 E.U./dl      Bilirubin, UA Small     Occult Blood, UA Negative    HS Troponin I 2hr [[id number]]  (Abnormal) Collected: 10/20/23 1028    Lab Status: Final result Specimen: Blood from Arm, Right Updated: 10/20/23 1059     hs TnI 2hr 91 ng/L      Delta 2hr hsTnI -19 ng/L     APTT [[id number]]  (Normal) Collected: 10/20/23 0923    Lab Status: Edited Result - FINAL Specimen: Blood from Arm, Right Updated: 10/20/23 1052     PTT 25 seconds     Protime-INR [[id number]]  (Abnormal) Collected: 10/20/23 0923    Lab Status: Edited Result - FINAL Specimen: Blood from Arm, Right Updated: 10/20/23 1052     Protime 17.1 seconds      INR 1.35    FLU/RSV/COVID - if FLU/RSV clinically relevant [[id number]]  (Normal) Collected: 10/20/23 0923    Lab Status: Final result Specimen: Nares from Nose Updated: 10/20/23 1017     SARS-CoV-2 Negative     INFLUENZA A PCR Negative     INFLUENZA B PCR Negative     RSV PCR Negative    Narrative:      FOR PEDIATRIC PATIENTS - copy/paste COVID Guidelines URL to browser: [email or web address]    SARS-CoV-2 assay is a Nucleic Acid Amplification assay intended for the  qualitative detection of nucleic acid from SARS-CoV-2 in nasopharyngeal  swabs. Results are for the presumptive identification of SARS-CoV-2 RNA. Positive results are indicative of infection with SARS-CoV-2, the virus  causing COVID-19, but do not rule out bacterial infection or co-infection  with other viruses. Laboratories within the [facility] and its  territories are required to report all positive results to the appropriate  public health authorities. Negative results do not preclude SARS-CoV-2  infection and should not be used as the sole basis for treatment or other  patient management decisions. Negative results must be combined with  clinical observations, patient history, and epidemiological information. This test has not been FDA cleared or approved. This test has been authorized by FDA under an Emergency Use Authorization  (EUA). This test is only authorized for the duration of time the  declaration that circumstances exist justifying the authorization of the  emergency use of an in vitro diagnostic tests for detection of SARS-CoV-2  virus and/or diagnosis of COVID-19 infection under section 564(b)(1) of  the Act, 21 U. S.C. 130SGN-6(F)(2), unless the authorization is terminated  or revoked sooner. The test has been validated but independent review by FDA  and CLIA is pending. Test performed using [employer] GeneXpert: This RT-PCR assay targets N2,  a region unique to SARS-CoV-2. A conserved region in the E-gene was chosen  for pan-Sarbecovirus detection which includes SARS-CoV-2. According to CMS-2020-01-R, this platform meets the definition of high-throughput technology. Lactic acid, plasma (w/reflex if result > 2.0) [[id number]]  (Abnormal) Collected: 10/20/23 0923    Lab Status: Final result Specimen: Blood from Arm, Right Updated: 10/20/23 1000     LACTIC ACID 4.0 mmol/L     Narrative:      Result may be elevated if tourniquet was used during collection.     Procalcitonin [[id number]]  (Normal) Collected: 10/20/23 0923    Lab Status: Final result Specimen: Blood from Arm, Right Updated: 10/20/23 1000     Procalcitonin 0.21 ng/ml     HS Troponin 0hr (reflex protocol) [[id number]]  (Abnormal) Collected: 10/20/23 0923    Lab Status: Final result Specimen: Blood from Arm, Right Updated: 10/20/23 0956     hs TnI 0hr 110 ng/L     B-Type Natriuretic Peptide(BNP) [[id number]]  (Abnormal) Collected: 10/20/23 0923    Lab Status: Final result Specimen: Blood from Arm, Right Updated: 10/20/23 0955     BNP 3,855 pg/mL     CBC and differential [[id number]]  (Abnormal) Collected: 10/20/23 0923    Lab Status: Final result Specimen: Blood from Arm, Right Updated: 10/20/23 0951     WBC 9.02 Thousand/uL      RBC 5.60 Million/uL      Hemoglobin 17.1 g/dL      Hematocrit 55.7 %       fL      MCH 30.5 pg      MCHC 30.7 g/dL      RDW 16.4 %      MPV 12.3 fL      Platelets 219 Thousands/uL      nRBC 0 /100 WBCs      Neutrophils Relative 77 %      Immat GRANS % 1 %      Lymphocytes Relative 13 %      Monocytes Relative 7 %      Eosinophils Relative 2 %      Basophils Relative 0 %      Neutrophils Absolute 6.96 Thousands/µL      Immature Grans Absolute 0.05 Thousand/uL Lymphocytes Absolute 1.20 Thousands/µL      Monocytes Absolute 0.64 Thousand/µL      Eosinophils Absolute 0.15 Thousand/µL      Basophils Absolute 0.02 Thousands/µL     Comprehensive metabolic panel [[id number]]  (Abnormal) Collected: 10/20/23 0923    Lab Status: Final result Specimen: Blood from Arm, Right Updated: 10/20/23 0949     Sodium 139 mmol/L      Potassium 5.2 mmol/L      Chloride 102 mmol/L      CO2 25 mmol/L      ANION GAP 12 mmol/L      BUN 44 mg/dL      Creatinine 1.88 mg/dL      Glucose 190 mg/dL      Calcium 9.2 mg/dL      AST 26 U/L      ALT 15 U/L      Alkaline Phosphatase 105 U/L      Total Protein 6.7 g/dL      Albumin 3.8 g/dL      Total Bilirubin 1.48 mg/dL      eGFR 31 ml/min/1.73sq m     Narrative:      [place] guidelines for Chronic Kidney Disease (CKD):     Stage 1 with normal or high GFR (GFR > 90 mL/min/1.73 square meters)    Stage 2 Mild CKD (GFR = 60-89 mL/min/1.73 square meters)    Stage 3A Moderate CKD (GFR = 45-59 mL/min/1.73 square meters)    Stage 3B Moderate CKD (GFR = 30-44 mL/min/1.73 square meters)    Stage 4 Severe CKD (GFR = 15-29 mL/min/1.73 square meters)    Stage 5 End Stage CKD (GFR <15 mL/min/1.73 square meters)  Note: GFR calculation is accurate only with a steady state creatinine    Magnesium [[id number]]  (Normal) Collected: 10/20/23 0923    Lab Status: Final result Specimen: Blood from Arm, Right Updated: 10/20/23 0949     Magnesium 2.5 mg/dL     Blood culture #1 [[id number]] Collected: 10/20/23 0930    Lab Status: In process Specimen: Blood from Hand, Left Updated: 10/20/23 0938    Blood culture #2 [[id number]] Collected: 10/20/23 0923    Lab Status: In process Specimen: Blood from Arm, Right Updated: 10/20/23 0930                   PE Study with CT Abdomen and Pelvis with contrast   Final Result by Gurvinder Villanueva MD (10/20 1046)      No pulmonary embolism. Cardiomegaly with moderate to large bilateral pleural effusions.  Reflux of contrast into the inferior vena cava and portal veins, suggestive of right heart dysfunction. Cirrhosis with sequela of portal hypertension including moderate to large abdominopelvic ascites. No suspicious hepatic mass identified. Diffuse anasarca and asymmetric left chest wall edema. Workstation performed: [id number]         XR chest 1 view portable   Final Result by Marie Musa MD (10/20 0351)      Low lung volumes with vascular crowding. Question pulmonary venous congestion with small effusions and probable left base atelectasis. Question right midlung nodule. Recommend further evaluation with PA and lateral chest radiographs with dual energy subtraction when the patient is able. This study was marked in Epic for immediate notification and follow-up. Workstation performed: [id number]                    Procedures  ECG 12 Lead Documentation Only    Date/Time: 10/20/2023 12:07 PM    Performed by: Minesh Kuhn PA-C  Authorized by: Minesh Kuhn PA-C    Indications / Diagnosis:  Sob  ECG reviewed by me, the ED Provider: yes    Patient location:  ED  Previous ECG:     Previous ECG:  Unavailable  Interpretation:     Interpretation: abnormal    Rate:     ECG rate:  136    ECG rate assessment: tachycardic    Rhythm:     Rhythm: atrial fibrillation    Conduction:     Conduction: abnormal      Abnormal conduction: complete RBBB    ST segments:     ST segments:  Non-specific  T waves:     T waves: non-specific             ED Course  ED Course as of 10/20/23 1222   Fri Oct 20, 2023   0954 Creatinine(!): 1.88   1019 Patient had a bout of A-fib RVR rate was 180s, given Cardizem x1.   1019 hs TnI 0hr(!): 110   1019 BNP(!): 3,855  The patient is in acute heart failure no chest pain, kidney function is creatinine 1.88                               SBIRT 20yo+      Flowsheet Row Most Recent Value   Initial Alcohol Screen: US AUDIT-C     1.  How often do you have a drink containing alcohol? 0 Filed at: 10/20/2023 0909   2. How many drinks containing alcohol do you have on a typical day you are drinking? 0 Filed at: 10/20/2023 0909   3b. FEMALE Any Age, or MALE 65+: How often do you have 4 or more drinks on one occassion? 0 Filed at: 10/20/2023 3644   Audit-C Score 0 Filed at: 10/20/2023 5362   EDUIN: How many times in the past year have you. .. Used an illegal drug or used a prescription medication for non-medical reasons? Never Filed at: 10/20/2023 0909                      Medical Decision Making  The patient is an 78-year-old male with a past medical history of ladder cancer and CAD who presents to the emergency department today via EMS from home for the concern of edema and shortness of breath. Wife states that he has not seen a physician for years. The patient not been on any medications over the last 4 years. The patient has been having more swelling and shortness of breath over the last few weeks. Patient upon EMS arrival was 75% on room air. EMS states they gave a neb treatment in route due to slight wheezing. Patient was placed on nasal cannula oxygen and improved significantly. No recent vomiting, nausea, chest pain, abdominal pain, diarrhea, falls or traumas, headache blurred vision. States he has never used oxygen at home in the past.  No history of COPD or asthma. Upon arrival the patient was stable on 4 L nasal cannula oxygen, did trial room air and patient began to become hypoxic to 85% was tachypneic. Patient was then placed back on nasal cannula oxygen. Patient on examination had anasarca, lower leg edema and shortness of breath with Rales. Consistent with fluid retention. Patient's EKG revealed A-fib RVR, monitor did reveal episode of RVR in the 180s, did respond well with 1 dose of Cardizem. Patient did had evaded creatinine of 1.88, previous blood work was over 3 years ago.   Patient's troponin was elevated no chest pain, troponin x2 demonstrated a flat elevation most likely secondary to CHF and edema. No PE was found no pneumonia was found. Patient was discussed with hospitalist service for admission for his respiratory distress with hypoxia and anasarca. Known per patient if he had a history of A-fib for unsure if this is a new diagnosis. With the hospital service for admission and accepted under Dr. Rosario Granger, stepdown 2. Differential diagnosis included but was not limited to PE, pneumonia, CHF, bacteremia, renal failure, ACS    Amount and/or Complexity of Data Reviewed  External Data Reviewed: notes. Labs: ordered. Decision-making details documented in ED Course. Radiology: ordered. Decision-making details documented in ED Course. ECG/medicine tests: ordered. Decision-making details documented in ED Course. Discussion of management or test interpretation with external provider(s): Dr. Danyel Holly drug management. Decision regarding hospitalization.              Disposition  Final diagnoses:   Anasarca   Respiratory distress   Hypoxia   CHF (congestive heart failure) (HCC)   Atrial fibrillation with rapid ventricular response ([address])     Time reflects when diagnosis was documented in both MDM as applicable and the Disposition within this note       Time User Action Codes Description Comment    10/20/2023 11:09 AM Darryle Ode Add [R60.1] Anasarca     10/20/2023 11:09 AM Darryle Ode Add [R06.03] Respiratory distress     10/20/2023 11:09 AM Darryle Ode Add [R09.02] Hypoxia     10/20/2023 11:09 AM Darryle Ode Add [I50.9] CHF (congestive heart failure) ([address])     10/20/2023 11:14 AM Darryle Ode Add [I48.91] Atrial fibrillation with rapid ventricular response [facility])           ED Disposition       ED Disposition   Admit    Condition   Stable    Date/Time   Fri Oct 20, 2023 1109    Comment   Case was discussed with darshan  and the patient's admission status was agreed to be Admission Status: inpatient status to the service of Dr. Tal Kramer    None         Patient's Medications    No medications on file       No discharge procedures on file.     PDMP Review       None            ED Provider  Electronically Signed by             Quinton Almanza PA-C  10/20/23 1406

## 2023-10-20 NOTE — ASSESSMENT & PLAN NOTE
Wt Readings from Last 3 Encounters:   10/20/23 106 kg (234 lb 5.6 oz)         Patient presents with worsening shortness of breath abdominal distention and lower extremity edema. Has no known prior history of congestive heart failure however has however has not seen a physician in 3 years and had stopped taking all his medications since February 2023  CT scan of the chest shows bilateral large pleural effusion, cardiomegaly, large ascites  Wife at bedside reports increased weight gain  Patient hypoxic requiring 3 L of supplemental oxygen via nasal cannula  Will initiate treatment with IV furosemide 40 mg twice daily. Will assess diuretic response and may need titration of dosing accordingly.   Continue with daily weight, strict intake output monitoring sodium and fluid restricted diet  Cardiology evaluation will be obtained  Follow-up on echocardiogram  Previous echo in 2022 showed normal left ventricular systolic function with diminished right ventricular systolic function

## 2023-10-20 NOTE — ED ATTENDING ATTESTATION
10/20/2023  I, Louise Cobb MD, saw and evaluated the patient. I have discussed the patient with the resident/non-physician practitioner and agree with the resident's/non-physician practitioner's findings, Plan of Care, and MDM as documented in the resident's/non-physician practitioner's note, except where noted. All available labs and Radiology studies were reviewed. I was present for key portions of any procedure(s) performed by the resident/non-physician practitioner and I was immediately available to provide assistance. At this point I agree with the current assessment done in the Emergency Department. I have conducted an independent evaluation of this patient a history and physical is as follows:    ED Course     Patient relays a progressively worsening shortness of breath and weakness over the last few weeks. Does not see a family doctor. Complains of increasing leg swelling. On exam the patient is awake alert. Decreased breath sounds with wheezing noted. Heart is a regular tachycardic. Patient has diffuse edema in the lower legs and along the abdominal wall.     Critical Care Time  Procedures

## 2023-10-20 NOTE — QUICK NOTE
CT head done prior to initiation of anticoagulation. Small density seen and reviewed by radiologist.  Concern for possible small bleed. We will hold off on initiation of heparin GTT and repeat CT head per radiologist recommendation in 12 hours. If unremarkable, will initiate anticoagulation for A-fib.

## 2023-10-21 ENCOUNTER — APPOINTMENT (INPATIENT)
Dept: RADIOLOGY | Facility: HOSPITAL | Age: 83
DRG: 291 | End: 2023-10-21
Payer: COMMERCIAL

## 2023-10-21 ENCOUNTER — APPOINTMENT (INPATIENT)
Dept: CT IMAGING | Facility: HOSPITAL | Age: 83
DRG: 291 | End: 2023-10-21
Payer: COMMERCIAL

## 2023-10-21 ENCOUNTER — APPOINTMENT (INPATIENT)
Dept: ULTRASOUND IMAGING | Facility: HOSPITAL | Age: 83
DRG: 291 | End: 2023-10-21
Payer: COMMERCIAL

## 2023-10-21 PROBLEM — R93.0 ABNORMAL HEAD CT: Status: ACTIVE | Noted: 2023-10-21

## 2023-10-21 LAB
ALBUMIN SERPL BCP-MCNC: 3 G/DL (ref 3.5–5)
ALP SERPL-CCNC: 82 U/L (ref 34–104)
ALT SERPL W P-5'-P-CCNC: 11 U/L (ref 7–52)
ANION GAP SERPL CALCULATED.3IONS-SCNC: 11 MMOL/L
ANISOCYTOSIS BLD QL SMEAR: PRESENT
AST SERPL W P-5'-P-CCNC: 21 U/L (ref 13–39)
BASOPHILS # BLD MANUAL: 0 THOUSAND/UL (ref 0–0.1)
BASOPHILS NFR MAR MANUAL: 0 % (ref 0–1)
BILIRUB SERPL-MCNC: 1.05 MG/DL (ref 0.2–1)
BUN SERPL-MCNC: 44 MG/DL (ref 5–25)
BURR CELLS BLD QL SMEAR: PRESENT
CALCIUM ALBUM COR SERPL-MCNC: 9.4 MG/DL (ref 8.3–10.1)
CALCIUM SERPL-MCNC: 8.6 MG/DL (ref 8.4–10.2)
CHLORIDE SERPL-SCNC: 106 MMOL/L (ref 96–108)
CHOLEST SERPL-MCNC: 122 MG/DL
CO2 SERPL-SCNC: 23 MMOL/L (ref 21–32)
CREAT SERPL-MCNC: 1.77 MG/DL (ref 0.6–1.3)
EOSINOPHIL # BLD MANUAL: 0.09 THOUSAND/UL (ref 0–0.4)
EOSINOPHIL NFR BLD MANUAL: 1 % (ref 0–6)
ERYTHROCYTE [DISTWIDTH] IN BLOOD BY AUTOMATED COUNT: 16.3 % (ref 11.6–15.1)
FERRITIN SERPL-MCNC: 49 NG/ML (ref 24–336)
GFR SERPL CREATININE-BSD FRML MDRD: 34 ML/MIN/1.73SQ M
GLUCOSE SERPL-MCNC: 115 MG/DL (ref 65–140)
GLUCOSE SERPL-MCNC: 119 MG/DL (ref 65–140)
GLUCOSE SERPL-MCNC: 123 MG/DL (ref 65–140)
GLUCOSE SERPL-MCNC: 155 MG/DL (ref 65–140)
GLUCOSE SERPL-MCNC: 184 MG/DL (ref 65–140)
HBV CORE AB SER QL: NORMAL
HBV CORE IGM SER QL: NORMAL
HBV SURFACE AG SER QL: NORMAL
HCT VFR BLD AUTO: 46.6 % (ref 36.5–49.3)
HCV AB SER QL: NORMAL
HDLC SERPL-MCNC: 33 MG/DL
HGB BLD-MCNC: 14.6 G/DL (ref 12–17)
INR PPP: 1.3 (ref 0.84–1.19)
LDLC SERPL CALC-MCNC: 60 MG/DL (ref 0–100)
LG PLATELETS BLD QL SMEAR: PRESENT
LYMPHOCYTES # BLD AUTO: 0.91 THOUSAND/UL (ref 0.6–4.47)
LYMPHOCYTES # BLD AUTO: 10 % (ref 14–44)
MAGNESIUM SERPL-MCNC: 2.2 MG/DL (ref 1.9–2.7)
MCH RBC QN AUTO: 30.3 PG (ref 26.8–34.3)
MCHC RBC AUTO-ENTMCNC: 31.3 G/DL (ref 31.4–37.4)
MCV RBC AUTO: 97 FL (ref 82–98)
MONOCYTES # BLD AUTO: 0.63 THOUSAND/UL (ref 0–1.22)
MONOCYTES NFR BLD: 7 % (ref 4–12)
NEUTROPHILS # BLD MANUAL: 7.44 THOUSAND/UL (ref 1.85–7.62)
NEUTS HYPERSEG BLD QL SMEAR: PRESENT
NEUTS SEG NFR BLD AUTO: 82 % (ref 43–75)
NONHDLC SERPL-MCNC: 89 MG/DL
OVALOCYTES BLD QL SMEAR: PRESENT
PLATELET # BLD AUTO: 91 THOUSANDS/UL (ref 149–390)
PLATELET BLD QL SMEAR: ABNORMAL
PMV BLD AUTO: 13.7 FL (ref 8.9–12.7)
POTASSIUM SERPL-SCNC: 4.5 MMOL/L (ref 3.5–5.3)
PROCALCITONIN SERPL-MCNC: 0.32 NG/ML
PROT SERPL-MCNC: 5.3 G/DL (ref 6.4–8.4)
PROTHROMBIN TIME: 16.5 SECONDS (ref 11.6–14.5)
RBC # BLD AUTO: 4.82 MILLION/UL (ref 3.88–5.62)
SODIUM SERPL-SCNC: 140 MMOL/L (ref 135–147)
TRANSFERRIN SERPL-MCNC: 234 MG/DL (ref 203–362)
TRIGL SERPL-MCNC: 144 MG/DL
TSH SERPL DL<=0.05 MIU/L-ACNC: 3.99 UIU/ML (ref 0.45–4.5)
WBC # BLD AUTO: 9.07 THOUSAND/UL (ref 4.31–10.16)

## 2023-10-21 PROCEDURE — 84466 ASSAY OF TRANSFERRIN: CPT | Performed by: INTERNAL MEDICINE

## 2023-10-21 PROCEDURE — 82948 REAGENT STRIP/BLOOD GLUCOSE: CPT

## 2023-10-21 PROCEDURE — G1004 CDSM NDSC: HCPCS

## 2023-10-21 PROCEDURE — 93971 EXTREMITY STUDY: CPT | Performed by: SURGERY

## 2023-10-21 PROCEDURE — 84145 PROCALCITONIN (PCT): CPT | Performed by: INTERNAL MEDICINE

## 2023-10-21 PROCEDURE — 83735 ASSAY OF MAGNESIUM: CPT | Performed by: INTERNAL MEDICINE

## 2023-10-21 PROCEDURE — 80061 LIPID PANEL: CPT | Performed by: INTERNAL MEDICINE

## 2023-10-21 PROCEDURE — 85007 BL SMEAR W/DIFF WBC COUNT: CPT | Performed by: INTERNAL MEDICINE

## 2023-10-21 PROCEDURE — 71045 X-RAY EXAM CHEST 1 VIEW: CPT

## 2023-10-21 PROCEDURE — 85027 COMPLETE CBC AUTOMATED: CPT | Performed by: INTERNAL MEDICINE

## 2023-10-21 PROCEDURE — 82728 ASSAY OF FERRITIN: CPT | Performed by: INTERNAL MEDICINE

## 2023-10-21 PROCEDURE — 99232 SBSQ HOSP IP/OBS MODERATE 35: CPT | Performed by: FAMILY MEDICINE

## 2023-10-21 PROCEDURE — 84443 ASSAY THYROID STIM HORMONE: CPT | Performed by: INTERNAL MEDICINE

## 2023-10-21 PROCEDURE — 93970 EXTREMITY STUDY: CPT | Performed by: SURGERY

## 2023-10-21 PROCEDURE — 70450 CT HEAD/BRAIN W/O DYE: CPT

## 2023-10-21 PROCEDURE — 80053 COMPREHEN METABOLIC PANEL: CPT | Performed by: INTERNAL MEDICINE

## 2023-10-21 PROCEDURE — 85610 PROTHROMBIN TIME: CPT | Performed by: INTERNAL MEDICINE

## 2023-10-21 RX ORDER — ACETAMINOPHEN 325 MG/1
650 TABLET ORAL EVERY 6 HOURS PRN
Status: DISCONTINUED | OUTPATIENT
Start: 2023-10-21 | End: 2023-10-27 | Stop reason: HOSPADM

## 2023-10-21 RX ORDER — OXYCODONE HYDROCHLORIDE 5 MG/1
2.5 TABLET ORAL EVERY 6 HOURS PRN
Status: DISCONTINUED | OUTPATIENT
Start: 2023-10-21 | End: 2023-10-22

## 2023-10-21 RX ADMIN — INSULIN LISPRO 1 UNITS: 100 INJECTION, SOLUTION INTRAVENOUS; SUBCUTANEOUS at 17:40

## 2023-10-21 RX ADMIN — POTASSIUM CHLORIDE 20 MEQ: 1500 TABLET, EXTENDED RELEASE ORAL at 09:20

## 2023-10-21 RX ADMIN — INSULIN LISPRO 1 UNITS: 100 INJECTION, SOLUTION INTRAVENOUS; SUBCUTANEOUS at 11:48

## 2023-10-21 RX ADMIN — METOPROLOL TARTRATE 25 MG: 25 TABLET, FILM COATED ORAL at 20:59

## 2023-10-21 RX ADMIN — FUROSEMIDE 40 MG: 10 INJECTION, SOLUTION INTRAMUSCULAR; INTRAVENOUS at 09:20

## 2023-10-21 RX ADMIN — METOPROLOL TARTRATE 25 MG: 25 TABLET, FILM COATED ORAL at 09:20

## 2023-10-21 RX ADMIN — FUROSEMIDE 40 MG: 10 INJECTION, SOLUTION INTRAMUSCULAR; INTRAVENOUS at 17:43

## 2023-10-21 NOTE — ASSESSMENT & PLAN NOTE
Wt Readings from Last 3 Encounters:   10/20/23 98.6 kg (217 lb 6 oz)       Patient presents with worsening shortness of breath abdominal distention and lower extremity edema. Has no known prior history of congestive heart failure however has however has not seen a physician in 3 years and had stopped taking all his medications since February 2023  CT scan of the chest shows bilateral large pleural effusion, cardiomegaly, large ascites  Wife at bedside reports increased weight gain  Patient hypoxic requiring 3 L of supplemental oxygen via nasal cannula  Will initiate treatment with IV furosemide 40 mg twice daily. -Patient is net -3 L today morning   Monitor daily weight and intake and output. Continue with current diuresis  Cardiology evaluation appreciate  Follow-up on echocardiogram  Ejection fraction is 25 -29% with diffuse global hypokinesis. Noted to have moderate to severe TR and severe MR. Patient's wife reported that she talk to cardiology yesterday and cardiology gave him a very poor prognosis. -Patient is not a surgical candidate.   Wife is considering hospice-we will consult hospice through case management

## 2023-10-21 NOTE — ASSESSMENT & PLAN NOTE
On admission with oxygen saturation 85% on room air at rest.  Currently on 3 L of supplemental oxygen via nasal cannula. Wean and titrate as tolerated to keep saturation between 88 to 94%. Likely in the setting of underlying congestive heart failure with large bilateral pleural effusions. Patient is currently on room air. Discussed with critical care. Hold on thoracocentesis for now.   Monitor respiratory status

## 2023-10-21 NOTE — UTILIZATION REVIEW
Initial Clinical Review    Admission: Date/Time/Statement:   Admission Orders (From admission, onward)       Ordered        10/20/23 1113  [address]  Once                          Orders Placed This Encounter   Procedures    INPATIENT ADMISSION     Standing Status:   Standing     Number of Occurrences:   1     Order Specific Question:   Level of Care     Answer:   Level 2 Stepdown / HOT [14]     Order Specific Question:   Estimated length of stay     Answer:   More than 2 Midnights     Order Specific Question:   Certification     Answer:   I certify that inpatient services are medically necessary for this patient for a duration of greater than two midnights. See H&P and MD Progress Notes for additional information about the patient's course of treatment. ED Arrival Information       Expected   10/20/2023 08:49    Arrival   10/20/2023 09:02    Acuity   Emergent              Means of arrival   Ambulance    Escorted by   [employer]    Admission type   Emergency              Arrival complaint   sob             Chief Complaint   Patient presents with    Shortness of Breath     Pt reports increasing SOB and fluid retention over the last 6 weeks        Initial Presentation: 78 y.o. male to ED from home via EMS who presents with several months of weight gain, worsening shortness of breath, abdominal distention, lower extremity edema. Has been increasingly weak and has been bedridden for over 2 weeks. She also noticed that he has been increasingly confused and has been having visual and auditory hallucinations. O2 sat 85 % on RA on 3l . Wean to keep O2 sat 88-94 % . Likely in setting of CHF w/ large bere pleural effusions . May need thoracentesis is O2 requirements cont to escalate . F/u venous BG . Give IV lasix , daily weights , I&O , monitor Na and fld restricted diet . Cardiology consult , echo . R foot ulcer chronic .  F/u xrays and podiatry eval . DM f/u A1c and start SSI . 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 Some urinary retention w/ > 500 ml on bladder scan . Initiate brian for strict I&O , monitor renal function , f/u US . Afib tele , initaite heparin gtt , lopressor . 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 PE: diminished BS , BLE edema , oriented to self and month . 10/20 Cardiology consult   Acute on chronic decompensated HF . Start lasix gtt , consider prn diuril IV qd . Monitor I&O , weights . Consider thoracentesis. Lopressor HR control , IV heparin . Consider paracentesis GI eval .     10/20 IM Quick Note   CT head done prior to initiation of anticoagulation. Small density seen and reviewed by radiologist.  Concern for possible small bleed. We will hold off on initiation of heparin GTT and repeat CT head per radiologist recommendation in 12 hours. Date:  10/21  Day 2: pt n RA , hold off on thoracentesis . Cont diuresis and monitor I&O , weights . Date: 10/22    Day 3: Has surpassed a 2nd midnight with active treatments and services, which include cont diuresis and monitoring of I&O, weights .        ED Triage Vitals [10/20/23 0911]   Temperature Pulse Respirations Blood Pressure SpO2   97.6 °F (36.4 °C) 66 20 129/92 (!) 85 %      Temp Source Heart Rate Source Patient Position - Orthostatic VS BP Location FiO2 (%)   Temporal Monitor Sitting Left arm --      Pain Score       No Pain          Wt Readings from Last 1 Encounters:   10/20/23 98.6 kg (217 lb 6 oz)     Additional Vital Signs:   Date/Time Temp Pulse Resp BP MAP (mmHg) SpO2 Calculated FIO2 (%) - Nasal Cannula Nasal Cannula O2 Flow Rate (L/min) O2 Device Patient Position - Orthostatic VS   10/21/23 0721 97.8 °F (36.6 °C) 86 16 110/62 81 -- -- -- -- Lying   10/21/23 0400 -- -- -- 110/62 -- -- -- -- -- --   10/21/23 0200 -- 87 15 125/87 102 95 % -- -- -- --   10/20/23 2300 -- 97 17 129/74 97 99 % -- -- -- --   10/20/23 2224 -- 98 -- 133/76 -- -- -- -- -- -- 10/20/23 2200 -- 93 18 133/76 97 99 % -- -- -- --   10/20/23 2102 -- 97 12 118/79 80 97 % -- -- -- --   10/20/23 2000 -- 92 13 117/66 86 99 % 28 2 L/min Nasal cannula --   10/20/23 1925 97.6 °F (36.4 °C) -- -- -- -- -- -- -- -- --   10/20/23 1700 -- 94 19 113/86 91 98 % -- -- -- --   10/20/23 1600 -- 88 13 106/75 85 95 % -- -- -- --   10/20/23 1500 97.9 °F (36.6 °C) 111 Abnormal  18 139/84 104 99 % -- -- Nasal cannula Lying   10/20/23 1338 -- 106 Abnormal  9 Abnormal  125/86 -- 99 % -- -- -- --   10/20/23 1300 -- 114 Abnormal  16 123/80 98 99 % 28 2 L/min Nasal cannula --   10/20/23 1244 -- 108 Abnormal  13 109/70 -- 94 % -- -- -- --   10/20/23 1200 -- 120 Abnormal  12 125/86 99 99 % 28 2 L/min Nasal cannula Lying   10/20/23 1100 -- 99 15 130/80 99 98 % 32 3 L/min Nasal cannula --   10/20/23 1030 -- 94 18 131/82 -- 99 % -- -- None (Room air) Sitting   10/20/23 1015 -- 125 Abnormal  16 -- -- 98 % 32 3 L/min Nasal cannula --   10/20/23 0945 -- 59 20 -- -- 95 % 36 4 L/min Nasal cannula --   10/20/23 0938 -- -- -- -- -- -- -- -- Nasal cannula  --   O2 Device: 4 L at 10/20/23 0938   10/20/23 0930 -- 66 -- 135/98 111 85 % Abnormal  36 4 L/min Nasal cannula --     Pertinent Labs/Diagnostic Test Results:   10/20 Echo  Left Ventricle: Left ventricular cavity size is mildly dilated. Wall thickness is normal. Systolic function is severely reduced. LVEF 25-29% There is severe global hypokinesis. Diastolic function is abnormal.    IVS: There is both systolic and diastolic flattening of the interventricular septum consistent with right ventricle pressure and volume overload. Right Ventricle: Right ventricular cavity size is moderately dilated. Systolic function is severely reduced. Abnormal tricuspid annular plane systolic excursion (TAPSE) < 1.7 cm. Left Atrium: The atrium is mildly dilated (35-41 mL/m2). Right Atrium: The atrium is severely dilated. Aortic Valve: There is aortic valve sclerosis.     Mitral Valve: There is mild annular calcification. There is severe regurgitation. Tricuspid Valve: There is moderate to severe regurgitation. The estimated right ventricular systolic pressure is 28.57 mmHg. Pericardium: There is a moderately sized right pleural effusion. Ascites is present. When compared to an echo from 12/2020, severely decreased LVEF, severe MR, moderate to severe TR. elevated PASP are all new findings    AF with RVR was present during the entire study. Findings reviewed with [employer]. 10/20 EKG ECG rate:  136     ECG rate assessment: tachycardic    Rhythm:     Rhythm: atrial fibrillation    Conduction:     Conduction: abnormal      Abnormal conduction: complete RBBB    ST segments:     ST segments:  Non-specific   T waves:     T waves: non-specific     CT head wo contrast   Final Result by Tia Prakash DO (10/21 7331)      High dense focus along the right falx unchanged from prior potentially dural calcification or tiny subdural hemorrhage unchanged from prior. Workstation performed: [id number]         CT head wo contrast   Final Result by Brittany Barragan MD (10/20 6736)      Tiny hyperdense focus along the right frontal falx. There is no prior imaging available for comparison. Cannot exclude small subdural hemorrhage. Recommend close follow-up head CT. I personally discussed this study with Zack Tomás on 10/20/2023 3:58 PM.               Workstation performed: [id number]         XR foot 3+ vw right   Final Result by Krystle Spangler MD (10/20 6485)      Erosive changes of the head of the second proximal phalanx and base of the fifth middle phalanx appearing in the area of patient's wound consistent with septic arthropathy and osteomyelitis. Resident: Faisal Goldsmith, the attending radiologist, have reviewed the images and agree with the final report above.       Workstation performed: [id number]         PE Study with CT Abdomen and Pelvis with contrast   Final Result by Aric Gonzalez MD (10/20 6856)      No pulmonary embolism. Cardiomegaly with moderate to large bilateral pleural effusions. Reflux of contrast into the inferior vena cava and portal veins, suggestive of right heart dysfunction. Cirrhosis with sequela of portal hypertension including moderate to large abdominopelvic ascites. No suspicious hepatic mass identified. Diffuse anasarca and asymmetric left chest wall edema. Workstation performed: [id number]         XR chest 1 view portable   Final Result by Zehra Castro MD (10/20 9509)      Low lung volumes with vascular crowding. Question pulmonary venous congestion with small effusions and probable left base atelectasis. Question right midlung nodule. Recommend further evaluation with PA and lateral chest radiographs with dual energy subtraction when the patient is able. This study was marked in Epic for immediate notification and follow-up.                Workstation performed: [id number]         US abdomen complete    (Results Pending)   VAS lower limb venous duplex study, complete bilateral    (Results Pending)   VAS lower limb arterial duplex, complete bilateral    (Results Pending)   VAS upper limb venous duplex scan, unilateral/limited    (Results Pending)   XR chest portable ICU    (Results Pending)     Results from last 7 days   Lab Units 10/20/23  0923   SARS-COV-2  Negative     Results from last 7 days   Lab Units 10/21/23  0508 10/20/23  1326 10/20/23  0923   WBC Thousand/uL 9.07 8.99 9.02   HEMOGLOBIN g/dL 14.6 15.8 17.1*   HEMATOCRIT % 46.6 51.1* 55.7*   PLATELETS Thousands/uL 91* 118* 141*   NEUTROS ABS Thousands/µL  --   --  6.96         Results from last 7 days   Lab Units 10/21/23  0508 10/20/23  0923   SODIUM mmol/L 140 139   POTASSIUM mmol/L 4.5 5.2   CHLORIDE mmol/L 106 102   CO2 mmol/L 23 25   ANION GAP mmol/L 11 12   BUN mg/dL 44* 44*   CREATININE mg/dL 1.77* 1.88*   EGFR ml/min/1.73sq m 34 31   CALCIUM mg/dL 8.6 9.2   MAGNESIUM mg/dL 2.2 2.5     Results from last 7 days   Lab Units 10/21/23  0508 10/20/23  1342 10/20/23  0923   AST U/L 21  --  26   ALT U/L 11  --  15   ALK PHOS U/L 82  --  105*   TOTAL PROTEIN g/dL 5.3*  --  6.7   ALBUMIN g/dL 3.0*  --  3.8   TOTAL BILIRUBIN mg/dL 1.05*  --  1.48*   AMMONIA umol/L  --  24  --      Results from last 7 days   Lab Units 10/21/23  0734 10/20/23  2223 10/20/23  1649 10/20/23  1248   POC GLUCOSE mg/dl 115 154* 145* 163*     Results from last 7 days   Lab Units 10/21/23  0508 10/20/23  0923   GLUCOSE RANDOM mg/dL 123 190*         Results from last 7 days   Lab Units 10/20/23  1342   HEMOGLOBIN A1C % 7.5*   EAG mg/dl 169     No results found for: "BETA-HYDROXYBUTYRATE"       Results from last 7 days   Lab Units 10/20/23  1309   PH AUDREY  7.364   PCO2 AUDREY mm Hg 40.5*   PO2 AUDREY mm Hg 45.5*   HCO3 AUDREY mmol/L 22.6*   BASE EXC AUDREY mmol/L -2.6   O2 CONTENT AUDREY ml/dL 16.8   O2 HGB, VENOUS % 77.2             Results from last 7 days   Lab Units 10/20/23  1551 10/20/23  1342 10/20/23  1028 10/20/23  0923   HS TNI 0HR ng/L  --  123*  --  110*   HS TNI 2HR ng/L 63*  --  91*  --    HSTNI D2 ng/L -60  --  -19  --          Results from last 7 days   Lab Units 10/21/23  0626 10/20/23  1342 10/20/23  0923   PROTIME seconds 16.5* 15.9* 17.1*   INR  1.30* 1.24* 1.35*   PTT seconds  --  26 25     Results from last 7 days   Lab Units 10/21/23  0508   TSH 3RD GENERATON uIU/mL 3.991     Results from last 7 days   Lab Units 10/21/23  0508 10/20/23  0923   PROCALCITONIN ng/ml 0.32* 0.21     Results from last 7 days   Lab Units 10/20/23  1158 10/20/23  0923   LACTIC ACID mmol/L 2.2* 4.0*         Results from last 7 days   Lab Units 10/20/23  0923   BNP pg/mL 3,855*     Results from last 7 days   Lab Units 10/20/23  1342   HEP B S AG  Non-reactive   HEP C AB  Non-reactive   HEP B C IGM  Non-reactive   HEP B C TOTAL AB  Non-reactive Results from last 7 days   Lab Units 10/20/23  1045   CLARITY UA  Clear   COLOR UA  Yellow   SPEC GRAV UA  1.020   PH UA  5.0   GLUCOSE UA mg/dl Negative   KETONES UA mg/dl Negative   BLOOD UA  Negative   PROTEIN UA mg/dl 30 (1+)*   NITRITE UA  Negative   BILIRUBIN UA  Small*   UROBILINOGEN UA E.U./dl 0.2   LEUKOCYTES UA  Negative   WBC UA /hpf None Seen   RBC UA /hpf None Seen   BACTERIA UA /hpf Occasional   EPITHELIAL CELLS WET PREP /hpf Occasional     Results from last 7 days   Lab Units 10/20/23  0923   INFLUENZA A PCR  Negative   INFLUENZA B PCR  Negative   RSV PCR  Negative     Results from last 7 days   Lab Units 10/20/23  0930 10/20/23  0923   BLOOD CULTURE  Received in Microbiology Lab. Culture in Progress. Received in Microbiology Lab. Culture in Progress.        ED Treatment:   Medication Administration from 10/20/2023 0902 to 10/20/2023 1234         Date/Time Order Dose Route Action     10/20/2023 0634 EDT furosemide (LASIX) injection 40 mg 40 mg Intravenous Given     10/20/2023 1005 EDT furosemide (LASIX) injection 20 mg 20 mg Intravenous Given     10/20/2023 1013 EDT diltiazem (CARDIZEM) injection 15 mg 15 mg Intravenous Given          Past Medical History:   Diagnosis Date    Bladder cancer ([address])     MI (myocardial infarction) ([address])     Prostate cancer ([address])      Present on Admission:  **None**      Admitting Diagnosis: Anasarca [R60.1]  CHF (congestive heart failure) ([facility]) [I50.9]  Respiratory distress [R06.03]  SOB (shortness of breath) [R06.02]  Hypoxia [R09.02]  Atrial fibrillation with rapid ventricular response ([address]) [I48.91]  Age/Sex: 78 y.o. male  Admission Orders:  Scheduled Medications:  furosemide, 40 mg, Intravenous, BID  insulin lispro, 1-5 Units, Subcutaneous, HS  insulin lispro, 1-6 Units, Subcutaneous, TID AC  metoprolol tartrate, 25 mg, Oral, Q12H [address]  potassium chloride, 20 mEq, Oral, Daily      Continuous IV Infusions:     PRN Meds:  acetaminophen, 650 mg, Oral, Q6H PRN  metoprolol, 2.5 mg, Intravenous, Q6H PRN  oxyCODONE, 2.5 mg, Oral, Q6H PRN    Tele   Fingerstick ac and hs   OT PT eval   Fld restriction 1800 ml  I&O   Up as poppy   Weights     IP CONSULT TO NUTRITION SERVICES  IP CONSULT TO CARDIOLOGY  IP CONSULT TO MEDICAL CRITICAL CARE  IP CONSULT TO PODIATRY    Network Utilization Review Department  ATTENTION: Please call with any questions or concerns to [phone] and carefully listen to the prompts so that you are directed to the right person. All voicemails are confidential.   For Discharge needs, contact Care Management DC Support Team at [phone] opt. 2  Send all requests for admission clinical reviews, approved or denied determinations and any other requests to dedicated fax number below belonging to the campus where the patient is receiving treatment.  List of dedicated fax numbers for the Facilities:  [place] DENIALS (Administrative/Medical Necessity) [phone]   DISCHARGE SUPPORT TEAM (NETWORK) [id number] [facility] (Maternity/NICU/Pediatrics) [phone]   [address] [place] [address] Drive [address] [place] [address] [address] [facility] [phone]   [address] [facility] [address] [facility] [address] [address] [facility] [address] [phone]

## 2023-10-21 NOTE — PROGRESS NOTES
[address] [facility]  Progress Note  Name: Srikanth Alaniz  MRN: [id number]  Unit/Bed#: -01 I Date of Admission: 10/20/2023   Date of Service: 10/21/2023 I Hospital Day: 1    Assessment/Plan   * Acute respiratory failure with hypoxia (HCC)  Assessment & Plan  On admission with oxygen saturation 85% on room air at rest.  Currently on 3 L of supplemental oxygen via nasal cannula. Wean and titrate as tolerated to keep saturation between 88 to 94%. Likely in the setting of underlying congestive heart failure with large bilateral pleural effusions. Patient is currently on room air. Discussed with critical care. Hold on thoracocentesis for now. Monitor respiratory status    Abnormal head CT  Assessment & Plan  Patient had a CAT scan done as part of his work-up for encephalopathy. Noted to have High dense focus along the right falx -dural calcification or tiny subdural hemorrhage . Repeat CAT scan showed stability. Discussed with neurosurgery. Denies any trauma   Patient's family opted not to have any anticoagulation. We will continue to monitor    Right foot ulcer ([address])  Assessment & Plan   Has chronic ulcer right foot. Has not seen a podiatrist or undergone any imaging. We will start with foot x-rays and podiatry evaluation. Follow-up on lower extremity arterial Doppler study as patient does have bilateral lower extremity edema and diminished pulses. 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 However he has been off all his medications since February 2023.   Follow-up on hemoglobin A1c and start sliding scale insulin. May need titration and adjustment of insulin regimen based on blood glucose control. Bladder tumor  Assessment & Plan  Bladder tumor s/pTURBT in 2020. Has not subsequently followed up with urologist.  Found to have urinary retention. Melchor catheter placed. Urinalysis with microscopy negative for hematuria. Stage 3 chronic kidney disease [facility])  Assessment & Plan  Lab Results   Component Value Date    EGFR 34 10/21/2023    EGFR 31 10/20/2023    CREATININE 1.77 (H) 10/21/2023    CREATININE 1.88 (H) 10/20/2023   Likely secondary to diabetic nephropathy. Baseline creatinine 1.6. Creatinine 1.88 on the day of admission. Continue to monitor renal function closely on current diuretic regimen. Patient with urinary retention. Status post Melchor catheter placed  Creatinine today is 1.77. Monitor creatinine with diuresis. Acute metabolic encephalopathy  Assessment & Plan  Wife reports worsening confusion over the last several weeks  No asterixis noted. Neuro exam nonfocal  Likely multifactorial in the setting of hypoxia related to heart failure. However in view of new onset atrial fibrillation cannot rule out acute CVA or hyperammonemia in the setting of cirrhosis seen on imaging. Continue with serial neurochecks  CT head reviewed. Questionable small tiny subdural versus dural fold on the CT. Discussed with neurosurgery. Stable for anticoagulation since the repeat CAT scan is stable. Atrial fibrillation with RVR ([address])  Assessment & Plan  Present admission with heart rate in the 120s. Received 1 dose of IV Cardizem in the emergency room. Subsequently heart rate varying between 100-1 10. Continue with telemetry monitoring  Initiate Lopressor 25mg twice daily  [id number] score 4  Discussed with wife about anticoagulation-plan was for anticoagulation. CT head is concerning for tiny subdural hemorrhage. Anticoagulation was on hold.   But on repeat imaging it remained stable. Case was discussed with neurosurgery physician assistant. Cleared for anticoagulation if medically necessary. Discussed with the wife. Wife reported that she would like to hold off on anticoagulation. She reported that she talked to cardiology and cardiology gave her very poor prognosis for him and he is not a surgical candidate. He understand his prognosis and would like to hold anticoagulation for now. Risk and benefit of anticoagulation versus known anticoagulation was explained to the wife-      Alcoholic cirrhosis of liver with ascites [facility])  Assessment & Plan  Seen on CT scan of the abdomen. Associated with large ascites. Will need paracentesis. 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 Follow-up on abdominal ultrasound-ordered pending  Follow-up on chronic hepatitis panel  Wife reported some confusion. Follow-up on ammonia-ammonia and chronic hepatitis panel is negative      Acute combined systolic and diastolic congestive heart failure (HCC)  Assessment & Plan  Wt Readings from Last 3 Encounters:   10/20/23 98.6 kg (217 lb 6 oz)       Patient presents with worsening shortness of breath abdominal distention and lower extremity edema. Has no known prior history of congestive heart failure however has however has not seen a physician in 3 years and had stopped taking all his medications since February 2023  CT scan of the chest shows bilateral large pleural effusion, cardiomegaly, large ascites  Wife at bedside reports increased weight gain  Patient hypoxic requiring 3 L of supplemental oxygen via nasal cannula  Will initiate treatment with IV furosemide 40 mg twice daily. -Patient is net -3 L today morning   Monitor daily weight and intake and output. Continue with current diuresis  Cardiology evaluation appreciate  Follow-up on echocardiogram  Ejection fraction is 25 -29% with diffuse global hypokinesis.   Noted to have moderate to severe TR and severe MR. Patient's wife reported that she talk to cardiology yesterday and cardiology gave him a very poor prognosis. -Patient is not a surgical candidate. Wife is considering hospice-we will consult hospice through case management                 VTE Pharmacologic Prophylaxis: VTE Score: 5  Hold DVT prophylaxis for now. Family is considering hospice  Patient Centered Rounds: I performed bedside rounds with nursing staff today. Discussions with Specialists or Other Care Team Provider: Neurosurgery    Education and Discussions with Family / Patient: Updated  (wife) at bedside. Total Time Spent on Date of Encounter in care of patient: 45  mins. This time was spent on one or more of the following: performing physical exam; counseling and coordination of care; obtaining or reviewing history; documenting in the medical record; reviewing/ordering tests, medications or procedures; communicating with other healthcare professionals and discussing with patient's family/caregivers. Current Length of Stay: 1 day(s)  Current Patient Status: Inpatient   Certification Statement: The patient will continue to require additional inpatient hospital stay due to CHF exacerbation  Discharge Plan: Anticipate discharge in 48-72 hrs to home with home services. Code Status: Level 3 - DNAR and DNI    Subjective:   Patient seen and examined. Patient reported that his breathing is improving. Discussed with critical care. Since the patient is on room air hold off on thoracocentesis. Discussed with neurosurgery about the abnormality seen on the CAT scan. Reported that patient is stable for anticoagulation and may repeat CAT scan on 24 hours if he was started on therapeutic anticoagulation. Discussed with the wife in the room.   Wife reported that she talked to the cardiologist yesterday and aware that his prognosis is poor      Objective:     Vitals:   Temp (24hrs), Av.1 °F (36.7 °C), Min:97.6 °F (36.4 °C), Max:98.9 °F (37.2 °C)    Temp:  [97.6 °F (36.4 °C)-98.9 °F (37.2 °C)] 98.9 °F (37.2 °C)  HR:  [80-98] 80  Resp:  [12-24] 24  BP: ()/(62-87) 87/62  SpO2:  [91 %-99 %] 91 %  Body mass index is 27.17 kg/m². Input and Output Summary (last 24 hours): Intake/Output Summary (Last 24 hours) at 10/21/2023 1508  Last data filed at 10/21/2023 1100  Gross per 24 hour   Intake 420 ml   Output 3300 ml   Net -2880 ml       Physical Exam:   Physical Exam  Constitutional:       General: He is not in acute distress. Appearance: He is not ill-appearing. HENT:      Head: Normocephalic. Nose: Nose normal.   Eyes:      General: No scleral icterus. Neck:      Comments: JVD present  Cardiovascular:      Rate and Rhythm: Rhythm irregular. Pulmonary:      Comments: Bilateral basal crackles  Abdominal:      General: There is no distension. Musculoskeletal:      Right lower leg: Edema present. Left lower leg: Edema present. Comments: Bilateral pitting pedal edema   Neurological:      Mental Status: He is alert. Mental status is at baseline. Cranial Nerves: No cranial nerve deficit. Additional Data:     Labs:  Results from last 7 days   Lab Units 10/21/23  0508 10/20/23  1326 10/20/23  0923   WBC Thousand/uL 9.07   < > 9.02   HEMOGLOBIN g/dL 14.6   < > 17.1*   HEMATOCRIT % 46.6   < > 55.7*   PLATELETS Thousands/uL 91*   < > 141*   NEUTROS PCT %  --   --  77*   LYMPHS PCT %  --   --  13*   LYMPHO PCT % 10*  --   --    MONOS PCT %  --   --  7   MONO PCT % 7  --   --    EOS PCT % 1  --  2    < > = values in this interval not displayed.      Results from last 7 days   Lab Units 10/21/23  0508   SODIUM mmol/L 140   POTASSIUM mmol/L 4.5   CHLORIDE mmol/L 106   CO2 mmol/L 23   BUN mg/dL 44*   CREATININE mg/dL 1.77*   ANION GAP mmol/L 11   CALCIUM mg/dL 8.6   ALBUMIN g/dL 3.0*   TOTAL BILIRUBIN mg/dL 1.05*   ALK PHOS U/L 82   ALT U/L 11   AST U/L 21   GLUCOSE RANDOM mg/dL 123     Results from last 7 days   Lab Units 10/21/23  0626   INR  1.30*     Results from last 7 days   Lab Units 10/21/23  1144 10/21/23  0734 10/20/23  2223 10/20/23  1649 10/20/23  1248   POC GLUCOSE mg/dl 184* 115 154* 145* 163*     Results from last 7 days   Lab Units 10/20/23  1342   HEMOGLOBIN A1C % 7.5*     Results from last 7 days   Lab Units 10/21/23  0508 10/20/23  1158 10/20/23  0923   LACTIC ACID mmol/L  --  2.2* 4.0*   PROCALCITONIN ng/ml 0.32*  --  0.21       Lines/Drains:  Invasive Devices       Peripheral Intravenous Line  Duration             Long-Dwell Peripheral IV (Midline) 77/97/48 Right Cephalic Vein 1 day              Drain  Duration             Urethral Catheter 18 Fr. 1 day                  Urinary Catheter:  Goal for removal: Remove after 48 hrs of I/O monitoring           Telemetry:  Telemetry Orders (From admission, onward)               24 Hour Telemetry Monitoring  Continuous x 24 Hours (Telem)        Question:  Reason for 24 Hour Telemetry  Answer:  Arrhythmias requiring acute medical intervention / PPM or ICD malfunction                     Telemetry Reviewed: Atrial fibrillation. HR averaging 80s  Indication for Continued Telemetry Use: Arrthymias requiring medical therapy             Imaging: Reviewed radiology reports from this admission including: CT head    Recent Cultures (last 7 days):   Results from last 7 days   Lab Units 10/20/23  0930 10/20/23  0923   BLOOD CULTURE  No Growth at 24 hrs. No Growth at 24 hrs.        Last 24 Hours Medication List:   Current Facility-Administered Medications   Medication Dose Route Frequency Provider Last Rate    acetaminophen  650 mg Oral Q6H PRN Derrick Oglesby MD      furosemide  40 mg Intravenous BID Eusebia Rinaldi MD      insulin lispro  1-5 Units Subcutaneous HS Eusebia Rinaldi MD      insulin lispro  1-6 Units Subcutaneous TID Joe Mast MD      metoprolol  2.5 mg Intravenous Q6H PRN Eusebia Rinaldi MD      metoprolol tartrate  25 mg Oral Q12H [address] [facility] Rosario Granger MD      oxyCODONE  2.5 mg Oral Q6H PRN Merry Wu MD      potassium chloride  20 mEq Oral Daily Zack England MD          Today, Patient Was Seen By: Merry Wu MD    **Please Note: This note may have been constructed using a voice recognition system. **

## 2023-10-21 NOTE — ASSESSMENT & PLAN NOTE
Wife reports worsening confusion over the last several weeks  No asterixis noted. Neuro exam nonfocal  Likely multifactorial in the setting of hypoxia related to heart failure. However in view of new onset atrial fibrillation cannot rule out acute CVA or hyperammonemia in the setting of cirrhosis seen on imaging. Continue with serial neurochecks  CT head reviewed. Questionable small tiny subdural versus dural fold on the CT. Discussed with neurosurgery. Stable for anticoagulation since the repeat CAT scan is stable.

## 2023-10-21 NOTE — ASSESSMENT & PLAN NOTE
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 However he has been off all his medications since February 2023. Follow-up on hemoglobin A1c and start sliding scale insulin. May need titration and adjustment of insulin regimen based on blood glucose control.

## 2023-10-21 NOTE — ASSESSMENT & PLAN NOTE
Seen on CT scan of the abdomen. Associated with large ascites. Will need paracentesis. 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 Follow-up on abdominal ultrasound-ordered pending  Follow-up on chronic hepatitis panel  Wife reported some confusion.   Follow-up on ammonia-ammonia and chronic hepatitis panel is negative

## 2023-10-21 NOTE — ASSESSMENT & PLAN NOTE
Lab Results   Component Value Date    EGFR 34 10/21/2023    EGFR 31 10/20/2023    CREATININE 1.77 (H) 10/21/2023    CREATININE 1.88 (H) 10/20/2023   Likely secondary to diabetic nephropathy. Baseline creatinine 1.6. Creatinine 1.88 on the day of admission. Continue to monitor renal function closely on current diuretic regimen. Patient with urinary retention. Status post Melchor catheter placed  Creatinine today is 1.77. Monitor creatinine with diuresis.

## 2023-10-21 NOTE — ASSESSMENT & PLAN NOTE
Patient had a CAT scan done as part of his work-up for encephalopathy. Noted to have High dense focus along the right falx -dural calcification or tiny subdural hemorrhage . Repeat CAT scan showed stability. Discussed with neurosurgery. Denies any trauma   Patient's family opted not to have any anticoagulation.   We will continue to monitor

## 2023-10-21 NOTE — ASSESSMENT & PLAN NOTE
Has chronic ulcer right foot. Has not seen a podiatrist or undergone any imaging. We will start with foot x-rays and podiatry evaluation. Follow-up on lower extremity arterial Doppler study as patient does have bilateral lower extremity edema and diminished pulses.   No DVT on Doppler

## 2023-10-21 NOTE — CASE MANAGEMENT
Case Management Assessment & Discharge Planning Note    Patient name Abad Ards  Location /-43 MRN [id number]  : 1940 Date 10/21/2023       Current Admission Date: 10/20/2023  Current Admission Diagnosis:Acute respiratory failure with hypoxia [facility])   Patient Active Problem List    Diagnosis Date Noted    Acute respiratory failure with hypoxia ([address]) 10/20/2023    Acute combined systolic and diastolic congestive heart failure ([address])     Alcoholic cirrhosis of liver with ascites ([address]) 10/20/2023    Atrial fibrillation with RVR ([address])     Acute metabolic encephalopathy     Stage 3 chronic kidney disease ([address]) 10/20/2023    Bladder tumor 10/20/2023    Type 2 diabetes mellitus with renal complication ([address])     Right foot ulcer ([address]) 10/20/2023      LOS (days): 1  Geometric Mean LOS (GMLOS) (days): 3.60  Days to GMLOS:2.4     OBJECTIVE:    Risk of Unplanned Readmission Score: 10.89         Current admission status: Inpatient       Preferred Pharmacy:   [address] [place] [address]  Phone: [phone] Fax: [phone]    Primary Care Provider: No primary care provider on file. Primary Insurance[facility]  Secondary Insurance:     ASSESSMENT:  [place] Proxies    There are no active Health Care Proxies on file.        Advance Directives  Does patient have a Health Care POA?: No  Was patient offered paperwork?: Yes (spouse declined)  Does patient currently have a Health Care decision maker?: Yes, please see Health Care Proxy section  Does patient have Advance Directives?: No  Was patient offered paperwork?: Yes (spouse declined)  Primary Contact: Robyn Porter, spouse         Readmission Root Cause  30 Day Readmission: No    Patient Information  Admitted from[de-identified] Home  Mental Status: Confused  During Assessment patient was accompanied by: Spouse  Assessment information provided by[de-identified] Spouse  Primary Caregiver: Spouse  Caregiver's Name[de-identified] Nai Seo, spouse  Caregiver's Relationship to Patient[de-identified] Family Member  Caregiver's Telephone Number[de-identified] [phone] (E)  Support Systems: Spouse/significant other, Son, Daughter, Family members  [facility] Residence: [address] [place] [address] do you live in?: [facility] entry access options.  Select all that apply.: Stairs  Number of steps to enter home.: 4  Do the steps have railings?: Yes  Type of Current Residence: 2 story home  Upon entering residence, is there a bedroom on the main floor (no further steps)?: No  A bedroom is located on the following floor levels of residence (select all that apply):: 2nd Floor  Upon entering residence, is there a bathroom on the main floor (no further steps)?: No  Indicate which floors of current residence have a bathroom (select all the apply):: 2nd Floor  Number of steps to 2nd floor from main floor: One Flight  In the last 12 months, was there a time when you were not able to pay the mortgage or rent on time?: No  In the last 12 months, how many places have you lived?: 1  In the last 12 months, was there a time when you did not have a steady place to sleep or slept in a shelter (including now)?: No  Homeless/housing insecurity resource given?: N/A  Living Arrangements: Lives w/ Spouse/significant other    Activities of Daily Living Prior to Admission  Functional Status: Assistance  Completes ADLs independently?: No  Level of ADL dependence: Assistance  Ambulates independently?: No  Level of ambulatory dependence: Assistance  Does patient use assisted devices?: Yes  Assisted Devices (DME) used: Bassam Hartman Cane (recliner lift chair)  Does patient currently own DME?: Yes  What DME does the patient currently own?: Bassam Blancas Minier (recliner lift chair)  Does patient have a history of Outpatient Therapy (PT/OT)?: No  Does the patient have a history of Short-Term Rehab?: No  Does patient have a history of HHC?: No  Does patient currently have [address]?: No         Patient Information Continued  Income Source: SSI/SSD  Does patient have prescription coverage?: Yes  Within the past 12 months, you worried that your food would run out before you got the money to buy more.: Never true  Within the past 12 months, the food you bought just didn't last and you didn't have money to get more.: Never true  Food insecurity resource given?: N/A  Does patient receive dialysis treatments?: No  Does patient have a history of substance abuse?: Yes  Historical substance use preference: Alcohol/ETOH  History of Withdrawal Symptoms: Denies past symptoms  Is patient currently in treatment for substance abuse?: N/A - sober  Does patient have a history of Mental Health Diagnosis?: No         Means of Transportation  Means of Transport to Appts[de-identified] Family transport  In the past 12 months, has lack of transportation kept you from medical appointments or from getting medications?: No  In the past 12 months, has lack of transportation kept you from meetings, work, or from getting things needed for daily living?: No  Was application for public transport provided?: N/A        DISCHARGE DETAILS:    Discharge planning discussed with[de-identified] patient and spouse, Jeffbeni Meseret of Choice: Yes     CM contacted family/caregiver?: Yes             Contacts  Patient Contacts: Tadeo Harrington, spouse  Relationship to Patient[de-identified] Family  Contact Method: In Person  Reason/Outcome: Continuity of Care, Discharge Planning              CM met with patient and spouse at the bedside,baseline information was obtained. CM discussed the role of CM in helping the patient develop a discharge plan and assist the patient in carry out their plan. Patient lives with spouse in 2 story home, bathroom and bedroom on second floor. Patient has been living up on 2nd floor of home for several months and bed bound to recliner lift chair past two weeks.   Patient stopped taking medications and seeing doctors 4 years ago, no PCP. CM to follow for CM discharge referral needs, spouse questioning hospice. CM to follow for family decision.

## 2023-10-21 NOTE — ASSESSMENT & PLAN NOTE
Present admission with heart rate in the 120s. Received 1 dose of IV Cardizem in the emergency room. Subsequently heart rate varying between 100-1 10. Continue with telemetry monitoring  Initiate Lopressor 25mg twice daily  [id number] score 4  Discussed with wife about anticoagulation-plan was for anticoagulation. CT head is concerning for tiny subdural hemorrhage. Anticoagulation was on hold. But on repeat imaging it remained stable. Case was discussed with neurosurgery physician assistant. Cleared for anticoagulation if medically necessary. Discussed with the wife. Wife reported that she would like to hold off on anticoagulation. She reported that she talked to cardiology and cardiology gave her very poor prognosis for him and he is not a surgical candidate. He understand his prognosis and would like to hold anticoagulation for now.   Risk and benefit of anticoagulation versus known anticoagulation was explained to the wife-

## 2023-10-22 ENCOUNTER — APPOINTMENT (INPATIENT)
Dept: ULTRASOUND IMAGING | Facility: HOSPITAL | Age: 83
DRG: 291 | End: 2023-10-22
Payer: COMMERCIAL

## 2023-10-22 PROBLEM — D69.6 THROMBOCYTOPENIA (HCC): Status: ACTIVE | Noted: 2023-10-22

## 2023-10-22 PROBLEM — Z71.89 GOALS OF CARE, COUNSELING/DISCUSSION: Status: ACTIVE | Noted: 2023-10-22

## 2023-10-22 LAB
ALBUMIN SERPL BCP-MCNC: 2.9 G/DL (ref 3.5–5)
ALP SERPL-CCNC: 78 U/L (ref 34–104)
ALT SERPL W P-5'-P-CCNC: 10 U/L (ref 7–52)
ANION GAP SERPL CALCULATED.3IONS-SCNC: 10 MMOL/L
ANION GAP SERPL CALCULATED.3IONS-SCNC: 8 MMOL/L
APTT PPP: 29 SECONDS (ref 23–37)
APTT PPP: 86 SECONDS (ref 23–37)
AST SERPL W P-5'-P-CCNC: 20 U/L (ref 13–39)
BILIRUB SERPL-MCNC: 1.01 MG/DL (ref 0.2–1)
BUN SERPL-MCNC: 41 MG/DL (ref 5–25)
BUN SERPL-MCNC: 41 MG/DL (ref 5–25)
CALCIUM ALBUM COR SERPL-MCNC: 9.2 MG/DL (ref 8.3–10.1)
CALCIUM SERPL-MCNC: 8.3 MG/DL (ref 8.4–10.2)
CALCIUM SERPL-MCNC: 8.3 MG/DL (ref 8.4–10.2)
CHLORIDE SERPL-SCNC: 105 MMOL/L (ref 96–108)
CHLORIDE SERPL-SCNC: 106 MMOL/L (ref 96–108)
CO2 SERPL-SCNC: 23 MMOL/L (ref 21–32)
CO2 SERPL-SCNC: 27 MMOL/L (ref 21–32)
CREAT SERPL-MCNC: 1.68 MG/DL (ref 0.6–1.3)
CREAT SERPL-MCNC: 1.71 MG/DL (ref 0.6–1.3)
ERYTHROCYTE [DISTWIDTH] IN BLOOD BY AUTOMATED COUNT: 16.4 % (ref 11.6–15.1)
GFR SERPL CREATININE-BSD FRML MDRD: 35 ML/MIN/1.73SQ M
GFR SERPL CREATININE-BSD FRML MDRD: 36 ML/MIN/1.73SQ M
GLUCOSE SERPL-MCNC: 106 MG/DL (ref 65–140)
GLUCOSE SERPL-MCNC: 117 MG/DL (ref 65–140)
GLUCOSE SERPL-MCNC: 136 MG/DL (ref 65–140)
GLUCOSE SERPL-MCNC: 146 MG/DL (ref 65–140)
GLUCOSE SERPL-MCNC: 177 MG/DL (ref 65–140)
GLUCOSE SERPL-MCNC: 206 MG/DL (ref 65–140)
HCT VFR BLD AUTO: 41.9 % (ref 36.5–49.3)
HGB BLD-MCNC: 13.2 G/DL (ref 12–17)
INR PPP: 1.28 (ref 0.84–1.19)
MAGNESIUM SERPL-MCNC: 2 MG/DL (ref 1.9–2.7)
MCH RBC QN AUTO: 30.9 PG (ref 26.8–34.3)
MCHC RBC AUTO-ENTMCNC: 31.5 G/DL (ref 31.4–37.4)
MCV RBC AUTO: 98 FL (ref 82–98)
PLATELET # BLD AUTO: 87 THOUSANDS/UL (ref 149–390)
PMV BLD AUTO: 12.8 FL (ref 8.9–12.7)
POTASSIUM SERPL-SCNC: 3.7 MMOL/L (ref 3.5–5.3)
POTASSIUM SERPL-SCNC: 3.8 MMOL/L (ref 3.5–5.3)
PROT SERPL-MCNC: 5.1 G/DL (ref 6.4–8.4)
PROTHROMBIN TIME: 16.4 SECONDS (ref 11.6–14.5)
RBC # BLD AUTO: 4.27 MILLION/UL (ref 3.88–5.62)
SODIUM SERPL-SCNC: 139 MMOL/L (ref 135–147)
SODIUM SERPL-SCNC: 140 MMOL/L (ref 135–147)
WBC # BLD AUTO: 6.48 THOUSAND/UL (ref 4.31–10.16)

## 2023-10-22 PROCEDURE — 80053 COMPREHEN METABOLIC PANEL: CPT | Performed by: STUDENT IN AN ORGANIZED HEALTH CARE EDUCATION/TRAINING PROGRAM

## 2023-10-22 PROCEDURE — 82948 REAGENT STRIP/BLOOD GLUCOSE: CPT

## 2023-10-22 PROCEDURE — 99497 ADVNCD CARE PLAN 30 MIN: CPT

## 2023-10-22 PROCEDURE — NC001 PR NO CHARGE: Performed by: PODIATRIST

## 2023-10-22 PROCEDURE — 85027 COMPLETE CBC AUTOMATED: CPT | Performed by: STUDENT IN AN ORGANIZED HEALTH CARE EDUCATION/TRAINING PROGRAM

## 2023-10-22 PROCEDURE — 85610 PROTHROMBIN TIME: CPT

## 2023-10-22 PROCEDURE — 80048 BASIC METABOLIC PNL TOTAL CA: CPT

## 2023-10-22 PROCEDURE — 83735 ASSAY OF MAGNESIUM: CPT

## 2023-10-22 PROCEDURE — 76700 US EXAM ABDOM COMPLETE: CPT

## 2023-10-22 PROCEDURE — 99232 SBSQ HOSP IP/OBS MODERATE 35: CPT

## 2023-10-22 PROCEDURE — 85730 THROMBOPLASTIN TIME PARTIAL: CPT

## 2023-10-22 RX ORDER — OXYCODONE HYDROCHLORIDE 5 MG/1
2.5 TABLET ORAL EVERY 6 HOURS PRN
Status: DISCONTINUED | OUTPATIENT
Start: 2023-10-22 | End: 2023-10-27 | Stop reason: HOSPADM

## 2023-10-22 RX ORDER — OXYCODONE HYDROCHLORIDE 5 MG/1
5 TABLET ORAL EVERY 6 HOURS PRN
Status: DISCONTINUED | OUTPATIENT
Start: 2023-10-22 | End: 2023-10-27 | Stop reason: HOSPADM

## 2023-10-22 RX ORDER — FUROSEMIDE 10 MG/ML
10 SYRINGE (ML) INJECTION CONTINUOUS
Status: DISCONTINUED | OUTPATIENT
Start: 2023-10-22 | End: 2023-10-27

## 2023-10-22 RX ORDER — HEPARIN SODIUM 1000 [USP'U]/ML
2000 INJECTION, SOLUTION INTRAVENOUS; SUBCUTANEOUS EVERY 6 HOURS PRN
Status: DISPENSED | OUTPATIENT
Start: 2023-10-22 | End: 2023-10-24

## 2023-10-22 RX ORDER — HEPARIN SODIUM 10000 [USP'U]/100ML
3-20 INJECTION, SOLUTION INTRAVENOUS
Status: DISCONTINUED | OUTPATIENT
Start: 2023-10-22 | End: 2023-10-24

## 2023-10-22 RX ORDER — HEPARIN SODIUM 1000 [USP'U]/ML
4000 INJECTION, SOLUTION INTRAVENOUS; SUBCUTANEOUS EVERY 6 HOURS PRN
Status: DISPENSED | OUTPATIENT
Start: 2023-10-22 | End: 2023-10-24

## 2023-10-22 RX ORDER — LIDOCAINE 50 MG/G
1 PATCH TOPICAL DAILY
Status: DISCONTINUED | OUTPATIENT
Start: 2023-10-22 | End: 2023-10-27 | Stop reason: HOSPADM

## 2023-10-22 RX ADMIN — OXYCODONE HYDROCHLORIDE 5 MG: 5 TABLET ORAL at 15:19

## 2023-10-22 RX ADMIN — HEPARIN SODIUM 11.1 UNITS/KG/HR: 10000 INJECTION, SOLUTION INTRAVENOUS at 15:13

## 2023-10-22 RX ADMIN — METOPROLOL TARTRATE 25 MG: 25 TABLET, FILM COATED ORAL at 21:05

## 2023-10-22 RX ADMIN — Medication 10 MG/HR: at 12:19

## 2023-10-22 RX ADMIN — LIDOCAINE 5% 1 PATCH: 700 PATCH TOPICAL at 15:18

## 2023-10-22 RX ADMIN — FUROSEMIDE 40 MG: 10 INJECTION, SOLUTION INTRAMUSCULAR; INTRAVENOUS at 07:52

## 2023-10-22 RX ADMIN — POTASSIUM CHLORIDE 20 MEQ: 1500 TABLET, EXTENDED RELEASE ORAL at 07:53

## 2023-10-22 RX ADMIN — METOPROLOL TARTRATE 25 MG: 25 TABLET, FILM COATED ORAL at 07:53

## 2023-10-22 RX ADMIN — INSULIN LISPRO 2 UNITS: 100 INJECTION, SOLUTION INTRAVENOUS; SUBCUTANEOUS at 16:59

## 2023-10-22 RX ADMIN — INSULIN LISPRO 1 UNITS: 100 INJECTION, SOLUTION INTRAVENOUS; SUBCUTANEOUS at 12:19

## 2023-10-22 NOTE — ASSESSMENT & PLAN NOTE
Patient had a CAT scan done as part of his work-up for encephalopathy. Noted to have High dense focus along the right falx -dural calcification or tiny subdural hemorrhage . Repeat CAT scan showed stability. Discussed with neurosurgery.   Denies any trauma   Patient's family agreeable to anticoagulation at this time with heparin

## 2023-10-22 NOTE — ASSESSMENT & PLAN NOTE
Has chronic ulcer right foot. Has not seen a podiatrist or undergone any imaging. Follow-up on lower extremity arterial Doppler study as patient does have bilateral lower extremity edema and diminished pulses. No DVT on Doppler  XR right foot: Erosive changes of the head of the second proximal phalanx and base of the fifth middle phalanx appearing in the area of patient's wound consistent with septic arthropathy and osteomyelitis. Arterial duplex and MRI initially ordered, d/c as patient's wife, daughter, and son would not want any treatment of infection of the foot with IV antibiotics or amputation. Podiatry consult discontinued  Discussed with patient's spouse, son, and daughter - they would NOT want patient on antibiotics and would not want any amputation. They are planning on doing hospice on discharge. Family does not want evaluation by podiatry at this time as it will not change their decision in being interested in comfort for patient. Only wanting to continue with lasix gtt at this time. Discussed that if patient does have infection of the bone, it is important to start IV antibiotics to prevent sepsis, septic shock, loss of limb, death - patient's wife, daughter, and son verbalized understanding of not starting antibiotics and all agreeable to not start at this time.

## 2023-10-22 NOTE — ASSESSMENT & PLAN NOTE
Present admission with heart rate in the 120s. Received 1 dose of IV Cardizem in the emergency room. Subsequently heart rate varying between 100-1 10. Continue with telemetry monitoring  Initiate Lopressor 25mg twice daily  [id number] score 4  Discussed with wife about anticoagulation-plan was for anticoagulation. CT head is concerning for tiny subdural hemorrhage. Anticoagulation was on hold. But on repeat imaging it remained stable. Case was discussed with neurosurgery physician assistant. Cleared for anticoagulation if medically necessary. Discussed with the wife. Wife reported that she would like to hold off on anticoagulation. She reported that she talked to cardiology and cardiology gave her very poor prognosis for him and he is not a surgical candidate. He understand his prognosis and would like to hold anticoagulation for now. Risk and benefit of anticoagulation versus known anticoagulation was explained to the wife- wife, daughter, and son are ok with heparin while inpatient, initially were not interested in [facility] after discharge, but family thought more on this and would like to do eliquis on discharge for prevention of stroke. They understand the risk of bleeding associated and would like to proceed.   Eliquis 2.5mg bid sent to pharmacy for price check

## 2023-10-22 NOTE — ASSESSMENT & PLAN NOTE
Lab Results   Component Value Date    EGFR 35 10/22/2023    EGFR 34 10/21/2023    EGFR 31 10/20/2023    CREATININE 1.71 (H) 10/22/2023    CREATININE 1.77 (H) 10/21/2023    CREATININE 1.88 (H) 10/20/2023   Likely secondary to diabetic nephropathy. Baseline creatinine 1.6. Creatinine 1.88 on the day of admission. Continue to monitor renal function closely on current diuretic regimen. Patient with urinary retention. Status post Melchor catheter placed  Creatinine today is 1.77. Monitor creatinine with diuresis. Monitor BID BMP with lasix gtt.

## 2023-10-22 NOTE — ASSESSMENT & PLAN NOTE
Patient's spouse Kristine Qiu, daughter, and son present at bedside and discussing goals of care with patient. She states that he would not want to be living like this and would like patient to go home on hospice when as stable as possible. Would like to try and get the fluid off him and then have him come back home. Does not want any extensive measures taking or any procedures. Does not want IV antibiotics or treatment of osteomyelitis. Discussed that patient can progress to sepsis, septic shock, have limb loss, and death sooner due to not treating active infection, patient's wife verbalized understanding stating "he has had this toe wound for so long and was likely infected the whole time, but we don't want any antibiotics". She does not want podiatry consult either. She would like patient to  be comfortable, biggest concern is getting more of the fluid off him so he can be comfortable for discharge, but would not want anything further after discharge from the hospital. [facility] with heparin at this time to prevent stroke while inpatient. Discussed with cardiology at bedside regarding patient's anticoagulation status - would like to be discharged with anticoagulation at this time. Would not want patient having a stroke. Eliquis 2.5 mg bid sent to pharmacy for price check. Will consult CM for hospice assistance for when patient stable for discharge.    POLST complete and in patient's chart  See acp note

## 2023-10-22 NOTE — ASSESSMENT & PLAN NOTE
Present admission with heart rate in the 120s. Received 1 dose of IV Cardizem in the emergency room. Subsequently heart rate varying between 100-1 10. Continue with telemetry monitoring  Initiate Lopressor 25mg twice daily  [id number] score 4  Discussed with wife about anticoagulation-plan was for anticoagulation. CT head is concerning for tiny subdural hemorrhage. Anticoagulation was on hold. But on repeat imaging it remained stable. Case was discussed with neurosurgery physician assistant. Cleared for anticoagulation if medically necessary. Discussed with the wife. Wife reported that she would like to hold off on anticoagulation. She reported that she talked to cardiology and cardiology gave her very poor prognosis for him and he is not a surgical candidate. He understand his prognosis and would like to hold anticoagulation for now. Risk and benefit of anticoagulation versus known anticoagulation was explained to the wife- wife, daughter, and son are ok with heparin while inpatient but do not plan on doing any anticoagulation after discharge.

## 2023-10-22 NOTE — ASSESSMENT & PLAN NOTE
Seen on CT scan of the abdomen. Associated with large ascites. Will need paracentesis. 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 Follow-up on abdominal ultrasound: Abnormal liver compatible with hepatic cirrhosis. No evidence of liver mass. Moderate ascites. Bilateral renal atrophy. No hydronephrosis or perinephric collection. Cholelithiasis without evidence of acute cholecystitis. Wife reported some confusion.   Follow-up on ammonia-ammonia and chronic hepatitis panel is negative

## 2023-10-22 NOTE — ASSESSMENT & PLAN NOTE
Noted to have thrombocytopenia on labs. No evidence of active or acute bleeding. Starting on heparin gtt due to a fib history. Patient's wife agreeable to [facility] at this time.   Will monitor

## 2023-10-22 NOTE — ASSESSMENT & PLAN NOTE
Has chronic ulcer right foot. Has not seen a podiatrist or undergone any imaging. Follow-up on lower extremity arterial Doppler study as patient does have bilateral lower extremity edema and diminished pulses. No DVT on Doppler  XR right foot: Erosive changes of the head of the second proximal phalanx and base of the fifth middle phalanx appearing in the area of patient's wound consistent with septic arthropathy and osteomyelitis. Arterial duplex and MRI initially ordered, d/c as patient's wife, daughter, and son would not want any treatment of infection of the foot with IV antibiotics or amputation. Podiatry consult discontinued  Discussed with patient's spouse - she would NOT want patient on antibiotics and would not want any amputation. She is planning on doing hospice on discharge. She does not want evaluation by podiatry at this time as it will not change their decision in being interested in comfort for patient and continuing with lasix gtt at this time. Discussed that if patient does have infection of the bone, it is important to start IV antibiotics to prevent sepsis, septic shock, loss of limb, death - patient's wife, daughter, and son verbalized understanding of not starting antibiotics and all agreeable to not start at this time.

## 2023-10-22 NOTE — PLAN OF CARE
Problem: NEUROSENSORY - ADULT  Goal: Achieves stable or improved neurological status  Description: INTERVENTIONS  - Monitor and report changes in neurological status  - Monitor vital signs such as temperature, blood pressure, glucose, and any other labs ordered   - Initiate measures to prevent increased intracranial pressure  - Monitor for seizure activity and implement precautions if appropriate      Outcome: Progressing     Problem: CARDIOVASCULAR - ADULT  Goal: Maintains optimal cardiac output and hemodynamic stability  Description: INTERVENTIONS:  - Monitor I/O, vital signs and rhythm  - Monitor for S/S and trends of decreased cardiac output  - Administer and titrate ordered vasoactive medications to optimize hemodynamic stability  - Assess quality of pulses, skin color and temperature  - Assess for signs of decreased coronary artery perfusion  - Instruct patient to report change in severity of symptoms  Outcome: Progressing  Goal: Absence of cardiac dysrhythmias or at baseline rhythm  Description: INTERVENTIONS:  - Continuous cardiac monitoring, vital signs, obtain 12 lead EKG if ordered  - Administer antiarrhythmic and heart rate control medications as ordered  - Monitor electrolytes and administer replacement therapy as ordered  Outcome: Progressing     Problem: GENITOURINARY - ADULT  Goal: Absence of urinary retention  Description: INTERVENTIONS:  - Assess patient’s ability to void and empty bladder  - Monitor I/O  - Bladder scan as needed  - Discuss with physician/AP medications to alleviate retention as needed  - Discuss catheterization for long term situations as appropriate  Outcome: Progressing  Goal: Urinary catheter remains patent  Description: INTERVENTIONS:  - Assess patency of urinary catheter  - If patient has a chronic brian, consider changing catheter if non-functioning  - Follow guidelines for intermittent irrigation of non-functioning urinary catheter  Outcome: Progressing     Problem: SKIN/TISSUE INTEGRITY - ADULT  Goal: Pressure injury heals and does not worsen  Description: Interventions:  - Implement low air loss mattress or specialty surface (Criteria met)  - Apply silicone foam dressing    - Consider nutrition services referral as needed  Outcome: Progressing     Problem: Prexisting or High Potential for Compromised Skin Integrity  Goal: Skin integrity is maintained or improved  Description: INTERVENTIONS:  - Identify patients at risk for skin breakdown  - Assess and monitor skin integrity  - Assess and monitor nutrition and hydration status  - Monitor labs   - Assess for incontinence   - Turn and reposition patient  - Assist with mobility/ambulation  - Relieve pressure over bony prominences  - Avoid friction and shearing  - Provide appropriate hygiene as needed including keeping skin clean and dry  - Evaluate need for skin moisturizer/barrier cream  - Collaborate with interdisciplinary team   - Patient/family teaching  - Consider wound care consult   Outcome: Progressing     Problem: Nutrition/Hydration-ADULT  Goal: Nutrient/Hydration intake appropriate for improving, restoring or maintaining nutritional needs  Description: Monitor and assess patient's nutrition/hydration status for malnutrition. Collaborate with interdisciplinary team and initiate plan and interventions as ordered. Monitor patient's weight and dietary intake as ordered or per policy. Utilize nutrition screening tool and intervene as necessary. Determine patient's food preferences and provide high-protein, high-caloric foods as appropriate.      INTERVENTIONS:  - Monitor oral intake, urinary output, labs, and treatment plans  - Assess nutrition and hydration status and recommend course of action  - Evaluate amount of meals eaten  - Assist patient with eating if necessary   - Allow adequate time for meals  - Recommend/ encourage appropriate diets, oral nutritional supplements, and vitamin/mineral supplements  - Order, calculate, and assess calorie counts as needed  - Recommend, monitor, and adjust tube feedings and TPN/PPN based on assessed needs  - Assess need for intravenous fluids  - Provide specific nutrition/hydration education as appropriate  - Include patient/family/caregiver in decisions related to nutrition  Outcome: Progressing

## 2023-10-22 NOTE — ASSESSMENT & PLAN NOTE
Lab Results   Component Value Date    HGBA1C 7.5 (H) 10/20/2023       Recent Labs     10/21/23  1647 10/21/23  2054 10/22/23  0728 10/22/23  1117   POCGLU 155* 119 117 177*         Blood Sugar Average: Last 72 hrs:  (P) [id number]  Patient has history of type 2 diabetes with underlying chronic kidney disease likely secondary to diabetic nephropathy. However he has been off all his medications since February 2023. Follow-up on hemoglobin A1c and start sliding scale insulin. May need titration and adjustment of insulin regimen based on blood glucose control.

## 2023-10-22 NOTE — ASSESSMENT & PLAN NOTE
Patient's spouse Tacho Ng, daughter, and son present at bedside and discussing goals of care with patient. She states that he would not want to be living like this and would like patient to go home on hospice when as stable as possible. Would like to try and get the fluid off him and then have him come back home. Does not want any extensive measures taking or any procedures. Does not want IV antibiotics or treatment of osteomyelitis. Discussed that patient can progress to sepsis, septic shock, have limb loss, and death sooner due to not treating active infection, patient's wife verbalized understanding stating "he has had this toe wound for so long and was likely infected the whole time, but we don't want any antibiotics". She does not want podiatry consult either. She would like patient to  be comfortable, biggest concern is getting more of the fluid off him so he can be comfortable for discharge, but would not want anything further after discharge from the hospital. [facility] with heparin at this time to prevent stroke while inpatient. Will consult CM for hospice assistance for when patient stable for discharge.

## 2023-10-22 NOTE — ASSESSMENT & PLAN NOTE
Seen on CT scan of the abdomen. Associated with large ascites. Will need paracentesis. 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 Follow-up on abdominal ultrasound-ordered pending  Wife reported some confusion.   Follow-up on ammonia-ammonia and chronic hepatitis panel is negative

## 2023-10-22 NOTE — PROGRESS NOTES
[address] [facility]  Progress Note  Name: Shayne Cheek  MRN: [id number]  Unit/Bed#: -79 I Date of Admission: 10/20/2023   Date of Service: 10/22/2023 I Hospital Day: 2    Assessment/Plan   * Acute combined systolic and diastolic congestive heart failure (HCC)  Assessment & Plan  Wt Readings from Last 3 Encounters:   10/22/23 101 kg (223 lb 12.3 oz)     Patient presents with worsening shortness of breath abdominal distention and lower extremity edema. Has no known prior history of congestive heart failure however has however has not seen a physician in 3 years and had stopped taking all his medications since February 2023  CT scan of the chest shows bilateral large pleural effusion, cardiomegaly, large ascites  Wife at bedside reports increased weight gain  Patient hypoxic requiring 3 L of supplemental oxygen via nasal cannula - has been weaned to room air  Will initiate treatment with IV furosemide 40 mg twice daily. -Patient is net -3 L today morning   Monitor daily weight and intake and output. Continue with current diuresis  Echo: Ejection fraction is 25 -29% with diffuse global hypokinesis. Noted to have moderate to severe TR and severe MR. Patient's wife reported that she talk to cardiology yesterday and cardiology gave him a very poor prognosis. -Patient is not a surgical candidate. Wife is considering hospice-we will consult hospice through case management  Cardiology: start lasix gtt 10 mg/hr, depending on results could consider diuril 1000 mg IVP daily, monitor BMP with K and mg repletion, increase lopressor if BP can tolerate and for HR management  Unfortunately, lasix gtt was not started, started on 10/22 - assess response. Acute respiratory failure with hypoxia ([address])  Assessment & Plan  On admission with oxygen saturation 85% on room air at rest.  Currently on 3 L of supplemental oxygen via nasal cannula.   Wean and titrate as tolerated to keep saturation between 88 to 94%. Likely in the setting of underlying congestive heart failure with large bilateral pleural effusions. Patient is currently on room air. Discussed with critical care. Hold on thoracocentesis for now. Monitor respiratory status  Remains on room air, started on lasix gtt. Right foot ulcer ([address])  Assessment & Plan  Has chronic ulcer right foot. Has not seen a podiatrist or undergone any imaging. Follow-up on lower extremity arterial Doppler study as patient does have bilateral lower extremity edema and diminished pulses. No DVT on Doppler  XR right foot: Erosive changes of the head of the second proximal phalanx and base of the fifth middle phalanx appearing in the area of patient's wound consistent with septic arthropathy and osteomyelitis. Arterial duplex and MRI initially ordered, d/c as patient's wife, daughter, and son would not want any treatment of infection of the foot with IV antibiotics or amputation. Podiatry consult discontinued  Discussed with patient's spouse, son, and daughter - they would NOT want patient on antibiotics and would not want any amputation. They are planning on doing hospice on discharge. Family does not want evaluation by podiatry at this time as it will not change their decision in being interested in comfort for patient. Only wanting to continue with lasix gtt at this time. Discussed that if patient does have infection of the bone, it is important to start IV antibiotics to prevent sepsis, septic shock, loss of limb, death - patient's wife, daughter, and son verbalized understanding of not starting antibiotics and all agreeable to not start at this time. Goals of care, counseling/discussion  Assessment & Plan  Patient's spouse Emily Velásquez, daughter, and son present at bedside and discussing goals of care with patient. She states that he would not want to be living like this and would like patient to go home on hospice when as stable as possible.  Would like to try and get the fluid off him and then have him come back home. Does not want any extensive measures taking or any procedures. Does not want IV antibiotics or treatment of osteomyelitis. Discussed that patient can progress to sepsis, septic shock, have limb loss, and death sooner due to not treating active infection, patient's wife verbalized understanding stating "he has had this toe wound for so long and was likely infected the whole time, but we don't want any antibiotics". She does not want podiatry consult either. She would like patient to  be comfortable, biggest concern is getting more of the fluid off him so he can be comfortable for discharge, but would not want anything further after discharge from the hospital. [address] [facility] with heparin at this time to prevent stroke while inpatient. Will consult CM for hospice assistance for when patient stable for discharge. POLST complete and in patient's chart  See acp note    Atrial fibrillation with RVR ([address])  Assessment & Plan  Present admission with heart rate in the 120s. Received 1 dose of IV Cardizem in the emergency room. Subsequently heart rate varying between 100-1 10. Continue with telemetry monitoring  Initiate Lopressor 25mg twice daily  [id number] score 4  Discussed with wife about anticoagulation-plan was for anticoagulation. CT head is concerning for tiny subdural hemorrhage. Anticoagulation was on hold. But on repeat imaging it remained stable. Case was discussed with neurosurgery physician assistant. Cleared for anticoagulation if medically necessary. Discussed with the wife. Wife reported that she would like to hold off on anticoagulation. She reported that she talked to cardiology and cardiology gave her very poor prognosis for him and he is not a surgical candidate. He understand his prognosis and would like to hold anticoagulation for now.   Risk and benefit of anticoagulation versus known anticoagulation was explained to the wife- wife, daughter, and son are ok with heparin while inpatient but do not plan on doing any anticoagulation after discharge. Acute metabolic encephalopathy  Assessment & Plan  Wife reports worsening confusion over the last several weeks  No asterixis noted. Neuro exam nonfocal  Likely multifactorial in the setting of hypoxia related to heart failure. However in view of new onset atrial fibrillation cannot rule out acute CVA or hyperammonemia in the setting of cirrhosis seen on imaging. Continue with serial neurochecks  CT head reviewed. Questionable small tiny subdural versus dural fold on the CT. Discussed with neurosurgery. Stable for anticoagulation since the repeat CAT scan is stable. Alcoholic cirrhosis of liver with ascites [facility])  Assessment & Plan  Seen on CT scan of the abdomen. Associated with large ascites. Will need paracentesis. 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 Follow-up on abdominal ultrasound-ordered pending  Wife reported some confusion. Follow-up on ammonia-ammonia and chronic hepatitis panel is negative    Thrombocytopenia ([address])  Assessment & Plan  Noted to have thrombocytopenia on labs. No evidence of active or acute bleeding. Starting on heparin gtt due to a fib history. Patient's wife agreeable to [facility] at this time. Will monitor     Abnormal head CT  Assessment & Plan  Patient had a CAT scan done as part of his work-up for encephalopathy. Noted to have High dense focus along the right falx -dural calcification or tiny subdural hemorrhage . Repeat CAT scan showed stability. Discussed with neurosurgery.   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 However he has been off all his medications since February 2023. Follow-up on hemoglobin A1c and start sliding scale insulin. May need titration and adjustment of insulin regimen based on blood glucose control. Bladder tumor  Assessment & Plan  Bladder tumor s/pTURBT in 2020. Has not subsequently followed up with urologist.  Found to have urinary retention. Brian catheter placed. Urinalysis with microscopy negative for hematuria. Family would like to rediscuss brian catheter closer to discharge if they would like him leaving with this or removal prior to discharge. Stage 3 chronic kidney disease [facility])  Assessment & Plan  Lab Results   Component Value Date    EGFR 35 10/22/2023    EGFR 34 10/21/2023    EGFR 31 10/20/2023    CREATININE 1.71 (H) 10/22/2023    CREATININE 1.77 (H) 10/21/2023    CREATININE 1.88 (H) 10/20/2023   Likely secondary to diabetic nephropathy. Baseline creatinine 1.6. Creatinine 1.88 on the day of admission. Continue to monitor renal function closely on current diuretic regimen. Patient with urinary retention. Status post Brian catheter placed  Creatinine today is 1.77. Monitor creatinine with diuresis. Monitor BID BMP with lasix gtt. VTE Pharmacologic Prophylaxis: VTE Score: 5 High Risk (Score >/= 5) - Pharmacological DVT Prophylaxis Ordered: heparin drip. Sequential Compression Devices Ordered. Patient Centered Rounds: I performed bedside rounds with nursing staff today. Discussions with Specialists or Other Care Team Provider: nursing, case management    Education and Discussions with Family / Patient: Updated  (wife) at bedside. Total Time Spent on Date of Encounter in care of patient: 55 mins.  This time was spent on one or more of the following: performing physical exam; counseling and coordination of care; obtaining or reviewing history; documenting in the medical record; reviewing/ordering tests, medications or procedures; communicating with other healthcare professionals and discussing with patient's family/caregivers. Current Length of Stay: 2 day(s)  Current Patient Status: Inpatient   Certification Statement: The patient will continue to require additional inpatient hospital stay due to acute CHF exacerbation, a fib rvr, AMS  Discharge Plan: Anticipate discharge in >72 hrs to home with home services. Code Status: Level 3 - DNAR and DNI    Subjective:   Patient seen and examined at bedside this morning. He feels that he is doing better today. He states that he is having improvement of his breathing. He is currently laying in bed comfortably. Denies nausea, vomiting, diarrhea, constipation, abdominal pain, CP, SOB, fever, chills. Does have some pain in the right toe but overall feeling better. He is answering orientation questions appropriately. Asking for more cough drops. Discussion with patient and wife at bedside, stating that on discharge he would like to go home with hospice. Patient also verbalized that he would not want any extensive measures taken. Objective:     Vitals:   Temp (24hrs), Av.4 °F (36.3 °C), Min:97.2 °F (36.2 °C), Max:97.7 °F (36.5 °C)    Temp:  [97.2 °F (36.2 °C)-97.7 °F (36.5 °C)] 97.2 °F (36.2 °C)  HR:  [81-95] 82  Resp:  [16-20] 18  BP: (105-131)/(68-77) 123/77  SpO2:  [90 %-97 %] 94 %  Body mass index is 27.97 kg/m². Input and Output Summary (last 24 hours): Intake/Output Summary (Last 24 hours) at 10/22/2023 6980  Last data filed at 10/22/2023 1454  Gross per 24 hour   Intake 240 ml   Output 3035 ml   Net -2795 ml       Physical Exam:   Physical Exam  Vitals reviewed. Constitutional:       General: He is not in acute distress. Appearance: He is ill-appearing. He is not toxic-appearing.    HENT: Head: Normocephalic and atraumatic. Mouth/Throat:      Mouth: Mucous membranes are moist.   Eyes:      Pupils: Pupils are equal, round, and reactive to light. Cardiovascular:      Rate and Rhythm: Normal rate. Rhythm irregular. Heart sounds: Murmur heard. Comments: Poor pulses present on bilateral lower extremities  Pulmonary:      Effort: No respiratory distress. Breath sounds: No stridor. Rales present. No wheezing or rhonchi. Comments: Crackles heard bilaterally. Saturating well on room air  Abdominal:      General: Bowel sounds are normal. There is distension. Palpations: Abdomen is soft. There is no mass. Tenderness: There is no abdominal tenderness. Genitourinary:     Comments: Melchor cathter in place draining clear yellow urine  Musculoskeletal:      Right lower leg: Edema present. Left lower leg: Edema present. Skin:     General: Skin is warm and dry. Findings: Erythema (left anticubital area with erythema) and lesion (right 2nd toe with ulcer present) present. Neurological:      Mental Status: He is alert and oriented to person, place, and time. Comments: Answering all orientation questions appropriately at this time   Psychiatric:         Mood and Affect: Mood normal.         Behavior: Behavior normal.          Additional Data:     Labs:  Results from last 7 days   Lab Units 10/22/23  0518 10/21/23  0508 10/20/23  1326 10/20/23  0923   WBC Thousand/uL 6.48 9.07   < > 9.02   HEMOGLOBIN g/dL 13.2 14.6   < > 17.1*   HEMATOCRIT % 41.9 46.6   < > 55.7*   PLATELETS Thousands/uL 87* 91*   < > 141*   NEUTROS PCT %  --   --   --  77*   LYMPHS PCT %  --   --   --  13*   LYMPHO PCT %  --  10*  --   --    MONOS PCT %  --   --   --  7   MONO PCT %  --  7  --   --    EOS PCT %  --  1  --  2    < > = values in this interval not displayed.      Results from last 7 days   Lab Units 10/22/23  0518   SODIUM mmol/L 140   POTASSIUM mmol/L 3.7   CHLORIDE mmol/L 105   CO2 mmol/L 27   BUN mg/dL 41*   CREATININE mg/dL 1.71*   ANION GAP mmol/L 8   CALCIUM mg/dL 8.3*   ALBUMIN g/dL 2.9*   TOTAL BILIRUBIN mg/dL 1.01*   ALK PHOS U/L 78   ALT U/L 10   AST U/L 20   GLUCOSE RANDOM mg/dL 106     Results from last 7 days   Lab Units 10/21/23  0626   INR  1.30*     Results from last 7 days   Lab Units 10/22/23  1117 10/22/23  0728 10/21/23  2054 10/21/23  1647 10/21/23  1144 10/21/23  0734 10/20/23  2223 10/20/23  1649 10/20/23  1248   POC GLUCOSE mg/dl 177* 117 119 155* 184* 115 154* 145* 163*     Results from last 7 days   Lab Units 10/20/23  1342   HEMOGLOBIN A1C % 7.5*     Results from last 7 days   Lab Units 10/21/23  0508 10/20/23  1158 10/20/23  0923   LACTIC ACID mmol/L  --  2.2* 4.0*   PROCALCITONIN ng/ml 0.32*  --  0.21       Lines/Drains:  Invasive Devices       Peripheral Intravenous Line  Duration             Long-Dwell Peripheral IV (Midline) 49/06/09 Right Cephalic Vein 2 days              Drain  Duration             Urethral Catheter 18 Fr. 2 days                  Urinary Catheter:  Goal for removal:  consult urology for voiding trial           Telemetry:  Telemetry Orders (From admission, onward)               24 Hour Telemetry Monitoring  Continuous x 24 Hours (Telem)        Question:  Reason for 24 Hour Telemetry  Answer:  Arrhythmias requiring acute medical intervention / PPM or ICD malfunction                     Telemetry Reviewed: Atrial fibrillation. HR averaging 80-90  Indication for Continued Telemetry Use: Acute CHF on >200 mg lasix/day or equivalent dose or with new reduced EF. Imaging: Reviewed radiology reports from this admission including: CT head, venous duplex bilatera lower extremity, venous duplex left upper extremity, XR right foot, CTA CAP, XR chest    Recent Cultures (last 7 days):   Results from last 7 days   Lab Units 10/20/23  0930 10/20/23  0923   BLOOD CULTURE  No Growth at 24 hrs. No Growth at 24 hrs.        Last 24 Hours Medication List: Current Facility-Administered Medications   Medication Dose Route Frequency Provider Last Rate    acetaminophen  650 mg Oral Q6H PRN Thomas Lyman MD      furosemide  10 mg/hr Intravenous Continuous Tha Larson PA-C 10 mg/hr (10/22/23 1219)    heparin (porcine)  3-20 Units/kg/hr (Order-Specific) Intravenous Titrated Tha Larson PA-C      heparin (porcine)  2,000 Units Intravenous Q6H PRN Gela Aragon PA-C      heparin (porcine)  4,000 Units Intravenous Q6H PRN Gela Aragon PA-C      insulin lispro  1-5 Units Subcutaneous HS Monie Britton MD      insulin lispro  1-6 Units Subcutaneous TID AC Monie Britton MD      lidocaine  1 patch Topical Daily Irina Aragon PA-C      metoprolol  2.5 mg Intravenous Q6H PRN Monie Britton MD      metoprolol tartrate  25 mg Oral Q12H [address] Monie Britton MD      oxyCODONE  2.5 mg Oral Q6H PRN Tha Larson PA-C      oxyCODONE  5 mg Oral Q6H PRN Gela Aragon PA-C      potassium chloride  20 mEq Oral Daily Thomas Lyman MD          Today, Patient Was Seen By: Tha Larson PA-C    **Please Note: This note may have been constructed using a voice recognition system. **

## 2023-10-22 NOTE — CONSULTS
- Per discussion with nursing and patient's family, they are considering hospice, will cancel consult for now and plans for intervention, may cancel MRI and arterial studies if pursuing conservative care.

## 2023-10-22 NOTE — ACP (ADVANCE CARE PLANNING)
Advanced Care Planning Progress Note    Serious Illness Conversation    1. What is your understanding now of where you are with your illness? Prognostic Understanding: appropriate understanding of prognosis  Family understands that patient has very poor prognosis with multiple co-morbid conditions. Patient has not been taking his medications for years. Patient's family (wife, daughter, and son) all make decisions for him and all understand that patient is not a surgical candidate and only medication management would be recommended at this time. They would like him to be stable from a volume stance, but would eventually want him discharged home on hospice. They also understand that he has infection of the bone and due to this, this can lead to sepsis, septic shock, limb loss, and death. They would not like treatment for this and they understand that the infection can spread and lead to death. They would like patient comfortable for whatever time he has left. He would not want an amputation and they would not want to put him through an amputation. Furthermore, they would not want patient on 6 weeks of IV antibiotics. 2. How much information about what is likely to be ahead with your illness would you like to have? Information: patient wants to be fully informed  Patient's family wants to be fully informed on patient's prognosis and time that he may have left. 3. What did you (clinician) communicate to the patient? Prognostic Communication: Time - I wish we were not in this situation, but I am worried that time may be as short as weeks to months (express as a range, e.g. days to weeks, weeks to months, months to a year). 4. If your health situation worsens, what are your most important goals? Goals: have my medical decisions respected, be physically comfortable, be at home     5. What are the biggest fears and worries about the future and your health?   Fears/Worries: pain, being a physical burden, getting treatments I do not want     6. What abilities are so critical to your life that you cannot imagine living without them? Unacceptable Function: being in chronic severe pain, being chronically confused or not being myself, being in pain or very uncomfortable, being unable to interact with others, not being myself     7. What gives you strength as you think about the future with your illness? Patient has very good support from his family. His wife, daughter, and son are all very supportive and help communicate what his wishes are. They all agree on treatment plans and would like patient to be comfortable. 8. If you become sicker, how much are you willing to go through for the possibility of gaining more time? Be in the hospital: No Have a feeding tube: No   Be in the ICU: No Live in a nursing home: No   Be on a ventilator: No Be uncomfortable: No   Be on dialysis: No Undergo aggressive test and/or procedures: No   9. How much does your proxy and family know about your priorities and wishes? Discussion Discussion: extensive discussion with family about goals and wishes     Tiffanie Cuevas heard you say that treating patient's current CHF exacerbation to get him as stable as possible for discharge and then be discharged home on hospice is really important to you. Keeping that in mind, and what we know about your illness, I recommend that we continue with lasix drip for now, talk with case management regarding hospice, and have cardiology evaluations for volume status to assist with discharge planning. This will help us make sure that your treatment plans reflect what’s important to you. How does this plan sound to you? I will do everything I can to help you through this.   Patient verbalized understanding of the plan     I have spent 30 minutes speaking with my patient on advanced care planning today or during this visit     Advanced directives         Ishaan Madrigal PA-C

## 2023-10-22 NOTE — ASSESSMENT & PLAN NOTE
Noted to have thrombocytopenia on labs. No evidence of active or acute bleeding. Starting on heparin gtt due to a fib history. Patient's wife agreeable to [facility] at this time.   Will monitor

## 2023-10-22 NOTE — PLAN OF CARE
Problem: NEUROSENSORY - ADULT  Goal: Achieves stable or improved neurological status  Description: INTERVENTIONS  - Monitor and report changes in neurological status  - Monitor vital signs such as temperature, blood pressure, glucose, and any other labs ordered   - Initiate measures to prevent increased intracranial pressure  - Monitor for seizure activity and implement precautions if appropriate      Outcome: Progressing  Goal: Achieves maximal functionality and self care  Description: INTERVENTIONS  - Monitor swallowing and airway patency with patient fatigue and changes in neurological status  - Encourage and assist patient to increase activity and self care.    - Encourage visually impaired, hearing impaired and aphasic patients to use assistive/communication devices  Outcome: Progressing     Problem: CARDIOVASCULAR - ADULT  Goal: Maintains optimal cardiac output and hemodynamic stability  Description: INTERVENTIONS:  - Monitor I/O, vital signs and rhythm  - Monitor for S/S and trends of decreased cardiac output  - Administer and titrate ordered vasoactive medications to optimize hemodynamic stability  - Assess quality of pulses, skin color and temperature  - Assess for signs of decreased coronary artery perfusion  - Instruct patient to report change in severity of symptoms  Outcome: Progressing  Goal: Absence of cardiac dysrhythmias or at baseline rhythm  Description: INTERVENTIONS:  - Continuous cardiac monitoring, vital signs, obtain 12 lead EKG if ordered  - Administer antiarrhythmic and heart rate control medications as ordered  - Monitor electrolytes and administer replacement therapy as ordered  Outcome: Progressing     Problem: RESPIRATORY - ADULT  Goal: Achieves optimal ventilation and oxygenation  Description: INTERVENTIONS:  - Assess for changes in respiratory status  - Assess for changes in mentation and behavior  - Position to facilitate oxygenation and minimize respiratory effort  - Oxygen administered by appropriate delivery if ordered  - Initiate smoking cessation education as indicated  - Encourage broncho-pulmonary hygiene including cough, deep breathe, Incentive Spirometry  - Assess the need for suctioning and aspirate as needed  - Assess and instruct to report SOB or any respiratory difficulty  - Respiratory Therapy support as indicated  Outcome: Progressing     Problem: GENITOURINARY - ADULT  Goal: Maintains or returns to baseline urinary function  Description: INTERVENTIONS:  - Assess urinary function  - Encourage oral fluids to ensure adequate hydration if ordered  - Administer IV fluids as ordered to ensure adequate hydration  - Administer ordered medications as needed  - Offer frequent toileting  - Follow urinary retention protocol if ordered  Outcome: Progressing  Goal: Absence of urinary retention  Description: INTERVENTIONS:  - Assess patient’s ability to void and empty bladder  - Monitor I/O  - Bladder scan as needed  - Discuss with physician/AP medications to alleviate retention as needed  - Discuss catheterization for long term situations as appropriate  Outcome: Progressing  Goal: Urinary catheter remains patent  Description: INTERVENTIONS:  - Assess patency of urinary catheter  - If patient has a chronic brian, consider changing catheter if non-functioning  - Follow guidelines for intermittent irrigation of non-functioning urinary catheter  Outcome: Progressing     Problem: METABOLIC, FLUID AND ELECTROLYTES - ADULT  Goal: Electrolytes maintained within normal limits  Description: INTERVENTIONS:  - Monitor labs and assess patient for signs and symptoms of electrolyte imbalances  - Administer electrolyte replacement as ordered  - Monitor response to electrolyte replacements, including repeat lab results as appropriate  - Instruct patient on fluid and nutrition as appropriate  Outcome: Progressing  Goal: Fluid balance maintained  Description: INTERVENTIONS:  - Monitor labs   - Monitor I/O and WT  - Instruct patient on fluid and nutrition as appropriate  - Assess for signs & symptoms of volume excess or deficit  Outcome: Progressing  Goal: Glucose maintained within target range  Description: INTERVENTIONS:  - Monitor Blood Glucose as ordered  - Assess for signs and symptoms of hyperglycemia and hypoglycemia  - Administer ordered medications to maintain glucose within target range  - Assess nutritional intake and initiate nutrition service referral as needed  Outcome: Progressing

## 2023-10-22 NOTE — ASSESSMENT & PLAN NOTE
On admission with oxygen saturation 85% on room air at rest.  Currently on 3 L of supplemental oxygen via nasal cannula. Wean and titrate as tolerated to keep saturation between 88 to 94%. Likely in the setting of underlying congestive heart failure with large bilateral pleural effusions. Patient is currently on room air. Discussed with critical care. Hold on thoracocentesis for now. Monitor respiratory status  Remains on room air, started on lasix gtt.

## 2023-10-22 NOTE — ASSESSMENT & PLAN NOTE
Bladder tumor s/pTURBT in 2020. Has not subsequently followed up with urologist.  Found to have urinary retention. Brian catheter placed. Urinalysis with microscopy negative for hematuria. Family would like to rediscuss brian catheter closer to discharge if they would like him leaving with this or removal prior to discharge.

## 2023-10-22 NOTE — ASSESSMENT & PLAN NOTE
Wt Readings from Last 3 Encounters:   10/22/23 101 kg (223 lb 12.3 oz)     Patient presents with worsening shortness of breath abdominal distention and lower extremity edema. Has no known prior history of congestive heart failure however has however has not seen a physician in 3 years and had stopped taking all his medications since February 2023  CT scan of the chest shows bilateral large pleural effusion, cardiomegaly, large ascites  Wife at bedside reports increased weight gain  Patient hypoxic requiring 3 L of supplemental oxygen via nasal cannula - has been weaned to room air  Will initiate treatment with IV furosemide 40 mg twice daily. -Patient is net -3 L today morning   Monitor daily weight and intake and output. Continue with current diuresis  Echo: Ejection fraction is 25 -29% with diffuse global hypokinesis. Noted to have moderate to severe TR and severe MR. Patient's wife reported that she talk to cardiology yesterday and cardiology gave him a very poor prognosis. -Patient is not a surgical candidate. Wife is considering hospice-we will consult hospice through case management  Cardiology: start lasix gtt 10 mg/hr, depending on results could consider diuril 1000 mg IVP daily, monitor BMP with K and mg repletion, increase lopressor if BP can tolerate and for HR management  Unfortunately, lasix gtt was not started, started on 10/22 - assess response.

## 2023-10-22 NOTE — ASSESSMENT & PLAN NOTE
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 However he has been off all his medications since February 2023. Follow-up on hemoglobin A1c and start sliding scale insulin. May need titration and adjustment of insulin regimen based on blood glucose control.

## 2023-10-23 ENCOUNTER — APPOINTMENT (INPATIENT)
Dept: NON INVASIVE DIAGNOSTICS | Facility: HOSPITAL | Age: 83
DRG: 291 | End: 2023-10-23
Payer: COMMERCIAL

## 2023-10-23 PROBLEM — J96.01 ACUTE RESPIRATORY FAILURE WITH HYPOXIA (HCC): Status: RESOLVED | Noted: 2023-10-20 | Resolved: 2023-10-23

## 2023-10-23 LAB
ANION GAP SERPL CALCULATED.3IONS-SCNC: 12 MMOL/L
APTT PPP: 148 SECONDS (ref 23–37)
APTT PPP: 48 SECONDS (ref 23–37)
APTT PPP: 62 SECONDS (ref 23–37)
BUN SERPL-MCNC: 40 MG/DL (ref 5–25)
CALCIUM SERPL-MCNC: 8.4 MG/DL (ref 8.4–10.2)
CHLORIDE SERPL-SCNC: 104 MMOL/L (ref 96–108)
CO2 SERPL-SCNC: 25 MMOL/L (ref 21–32)
CREAT SERPL-MCNC: 1.57 MG/DL (ref 0.6–1.3)
ERYTHROCYTE [DISTWIDTH] IN BLOOD BY AUTOMATED COUNT: 16.2 % (ref 11.6–15.1)
GFR SERPL CREATININE-BSD FRML MDRD: 39 ML/MIN/1.73SQ M
GLUCOSE SERPL-MCNC: 113 MG/DL (ref 65–140)
GLUCOSE SERPL-MCNC: 140 MG/DL (ref 65–140)
GLUCOSE SERPL-MCNC: 147 MG/DL (ref 65–140)
GLUCOSE SERPL-MCNC: 201 MG/DL (ref 65–140)
GLUCOSE SERPL-MCNC: 273 MG/DL (ref 65–140)
HCT VFR BLD AUTO: 50 % (ref 36.5–49.3)
HGB BLD-MCNC: 15.7 G/DL (ref 12–17)
MAGNESIUM SERPL-MCNC: 1.9 MG/DL (ref 1.9–2.7)
MCH RBC QN AUTO: 30.5 PG (ref 26.8–34.3)
MCHC RBC AUTO-ENTMCNC: 31.4 G/DL (ref 31.4–37.4)
MCV RBC AUTO: 97 FL (ref 82–98)
PLATELET # BLD AUTO: 116 THOUSANDS/UL (ref 149–390)
PMV BLD AUTO: 12.1 FL (ref 8.9–12.7)
POTASSIUM SERPL-SCNC: 3.5 MMOL/L (ref 3.5–5.3)
RBC # BLD AUTO: 5.14 MILLION/UL (ref 3.88–5.62)
SODIUM SERPL-SCNC: 141 MMOL/L (ref 135–147)
WBC # BLD AUTO: 6.98 THOUSAND/UL (ref 4.31–10.16)

## 2023-10-23 PROCEDURE — 85730 THROMBOPLASTIN TIME PARTIAL: CPT | Performed by: FAMILY MEDICINE

## 2023-10-23 PROCEDURE — 82948 REAGENT STRIP/BLOOD GLUCOSE: CPT

## 2023-10-23 PROCEDURE — 85027 COMPLETE CBC AUTOMATED: CPT

## 2023-10-23 PROCEDURE — 93922 UPR/L XTREMITY ART 2 LEVELS: CPT | Performed by: SURGERY

## 2023-10-23 PROCEDURE — 93925 LOWER EXTREMITY STUDY: CPT

## 2023-10-23 PROCEDURE — 85730 THROMBOPLASTIN TIME PARTIAL: CPT

## 2023-10-23 PROCEDURE — 80048 BASIC METABOLIC PNL TOTAL CA: CPT | Performed by: FAMILY MEDICINE

## 2023-10-23 PROCEDURE — 99232 SBSQ HOSP IP/OBS MODERATE 35: CPT

## 2023-10-23 PROCEDURE — 93925 LOWER EXTREMITY STUDY: CPT | Performed by: SURGERY

## 2023-10-23 PROCEDURE — 93923 UPR/LXTR ART STDY 3+ LVLS: CPT

## 2023-10-23 PROCEDURE — 83735 ASSAY OF MAGNESIUM: CPT

## 2023-10-23 RX ORDER — METOPROLOL SUCCINATE 25 MG/1
25 TABLET, EXTENDED RELEASE ORAL 2 TIMES DAILY
Status: DISCONTINUED | OUTPATIENT
Start: 2023-10-23 | End: 2023-10-27 | Stop reason: HOSPADM

## 2023-10-23 RX ORDER — POTASSIUM CHLORIDE 20 MEQ/1
20 TABLET, EXTENDED RELEASE ORAL 2 TIMES DAILY
Status: DISCONTINUED | OUTPATIENT
Start: 2023-10-23 | End: 2023-10-27 | Stop reason: HOSPADM

## 2023-10-23 RX ADMIN — POTASSIUM CHLORIDE 20 MEQ: 1500 TABLET, EXTENDED RELEASE ORAL at 08:24

## 2023-10-23 RX ADMIN — HEPARIN SODIUM 2000 UNITS: 1000 INJECTION INTRAVENOUS; SUBCUTANEOUS at 19:16

## 2023-10-23 RX ADMIN — POTASSIUM CHLORIDE 20 MEQ: 1500 TABLET, EXTENDED RELEASE ORAL at 17:56

## 2023-10-23 RX ADMIN — INSULIN LISPRO 3 UNITS: 100 INJECTION, SOLUTION INTRAVENOUS; SUBCUTANEOUS at 22:24

## 2023-10-23 RX ADMIN — Medication 10 MG/HR: at 08:40

## 2023-10-23 RX ADMIN — INSULIN LISPRO 2 UNITS: 100 INJECTION, SOLUTION INTRAVENOUS; SUBCUTANEOUS at 11:30

## 2023-10-23 RX ADMIN — LIDOCAINE 5% 1 PATCH: 700 PATCH TOPICAL at 08:24

## 2023-10-23 RX ADMIN — METOPROLOL SUCCINATE 25 MG: 25 TABLET, EXTENDED RELEASE ORAL at 20:03

## 2023-10-23 RX ADMIN — METOPROLOL TARTRATE 25 MG: 25 TABLET, FILM COATED ORAL at 08:24

## 2023-10-23 NOTE — PROGRESS NOTES
Progress Note:Cardiology  Robin Quant 1940, 78 y.o. male MRN: [id number]    Unit/Bed#: -01 Encounter: [id number]  Attending Physician: Oliva Locke MD   Primary Care Provider: No primary care provider on file. Date admitted to hospital: 10/20/2023  Length of stay: 3       Assessment/Plan:    Acute hypoxic respiratory failure:   Resolved with diuresis    2. Acute on chronic HFrEF:  Echocardiogram 10/20/2023 shows LVEF 25-29%, RV dilated with reduced function, bi-atrial dilation, severe MR, mod-severe TR with PASP 65mmHg. Started on furosemide drip at 10mg/hr on 10/22/23 with excellent response. -3.8 L in 24 hours. Will continue Lasix drip today and reassess tomorrow. Strict I's/O's, standing daily weights as safe. Sodium and fluid restriction. Optimize electrolytes for K+ >4, Mag >2. See discussion under cardiomyopathy. 3. Dilated cardiomyopathy:  Echocardiogram 10/20/2023 with EF 25-29%, RV dilation with reduced function, severe MR, mod-severe TR with PASP 65mmHg. Previous echocardiogram in 2020 showed EF 58%. 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 Prognosis is poor and he is seriously considering hospice services. If he opts against hospice he should have eventual outpatient ischemic work up. Given his history of medication non-adherence and co-morbidities he is not a candidate for advanced therapies at this time. Will titrate GDMT as able. Transition from metoprolol tartrate to metoprolol succinate 25 mg BID this evening. Will add ARB, such as losartan as BP allows. Continue to diurese - see #2. 4. Severe mitral regurgitation:  Suspect functional. If he opts against hospice this should be re-evaluated when optimized on GDMT    5. Persistent atrial fibrillation with RVR:  History of permanent atrial fibrillation, previously on warfarin, currently on a heparin drip. HR's were uncontrolled at admission.  Heart rates have improved with addition of metoprolol tartrate 25 mg BID. Will transition to metoprolol succinate 25 mg BID and monitor. I discussed use of full anticoagulation for stroke prevention with patient and wife today. They are agreeable. Recommend Eliquis 2.5 mg BID, please have case management cost check. Continue telemetry for another 24 hours. Optimize electrolytes for K+ >4, Mag >2.    6. CAD with PCI to LAD, Lcx, RCA in 2005 at [facility], history of NSTEMI in 2011 with PCI and BMS to OM:  Will defer aspirin in lieu of full AC for stroke prevention in a fib. Not on statin with history of liver cirrhosis. Continue beta blocker - will transition from metoprolol tartrate to metoprolol succinate for GDMT. 7. Remote ETOH abuse, in remission. 8. Liver cirrhosis    9. Recent non-adherence to medical therapy      Subjective:   Patient seen and examined. No significant events overnight. Wife is at bedside. He is without complaint today other than some mild low back pain which is chronic. He denies chest pain, shortness of breath, lightheadedness or dizziness. No palpitations. He does have bilateral lower leg swelling and a cough. He states cough is improving. Review of Systems   Constitutional: Negative. HENT: Negative. Cardiovascular:  Positive for leg swelling. Negative for chest pain, dyspnea on exertion, irregular heartbeat, near-syncope, orthopnea and palpitations. Respiratory:  Negative for cough and snoring. Endocrine: Negative. Skin: Negative. Musculoskeletal: Negative. Gastrointestinal: Negative. Genitourinary: Negative. Neurological: Negative. Psychiatric/Behavioral: Negative. Objective:     Vitals: Blood pressure 114/78, pulse 100, temperature (!) 97.3 °F (36.3 °C), temperature source Temporal, resp. rate 18, height 6' 3" (1.905 m), weight 101 kg (222 lb 10.6 oz), SpO2 95 %. , Body mass index is 27.83 kg/m².,     Orthostatic Blood Pressures      Flowsheet Row Most Recent Value   Blood Pressure 114/78 filed at 10/23/2023 3371   Patient Position - Orthostatic VS Lying filed at 10/23/2023 1696            Physical Exam  Vitals and nursing note reviewed. Constitutional:       General: He is not in acute distress. Appearance: He is well-developed. Comments: Appears frail, chronically ill   HENT:      Head: Normocephalic and atraumatic. Eyes:      Conjunctiva/sclera: Conjunctivae normal.   Neck:      Vascular: JVD present. Cardiovascular:      Rate and Rhythm: Normal rate. Rhythm irregular. Heart sounds: No murmur heard. Pulmonary:      Effort: Pulmonary effort is normal. No respiratory distress. Breath sounds: Normal breath sounds. Abdominal:      Palpations: Abdomen is soft. Tenderness: There is no abdominal tenderness. Musculoskeletal:         General: No swelling. Cervical back: Neck supple. Right lower leg: Edema present. Left lower leg: Edema present. Skin:     General: Skin is warm and dry. Capillary Refill: Capillary refill takes less than 2 seconds. Neurological:      Mental Status: He is alert.    Psychiatric:         Mood and Affect: Mood normal.            Intake/Output Summary (Last 24 hours) at 10/23/2023 1014  Last data filed at 10/23/2023 0620  Gross per 24 hour   Intake 480 ml   Output 4325 ml   Net -3845 ml       Weight (last 2 days)       Date/Time Weight    10/23/23 0600 101 (222.66)    10/22/23 0600 101 (223.77)               Medications:      Current Facility-Administered Medications:     acetaminophen (TYLENOL) tablet 650 mg, 650 mg, Oral, Q6H PRN, Monie Britton MD    furosemide (LASIX) 500 mg infusion 50 mL, 10 mg/hr, Intravenous, Continuous, Melvenia Maker, PA-C, Last Rate: 1 mL/hr at 10/23/23 0840, 10 mg/hr at 10/23/23 0840    heparin (porcine) 25,000 units in 0.45% NaCl 250 mL infusion (premix), 3-20 Units/kg/hr (Order-Specific), Intravenous, Titrated, Melvenia Maker, PA-C, Last Rate: 7.3 mL/hr at 10/23/23 0617, 8.1 Units/kg/hr at 10/23/23 0617    heparin (porcine) injection 2,000 Units, 2,000 Units, Intravenous, Q6H PRN, Lyssa Aragon, PA-C    heparin (porcine) injection 4,000 Units, 4,000 Units, Intravenous, Q6H PRN, Lyssa Aragon, PA-C    insulin lispro (HumaLOG) 100 units/mL subcutaneous injection 1-5 Units, 1-5 Units, Subcutaneous, HS, Monie Britton MD, 1 Units at 10/20/23 2226    insulin lispro (HumaLOG) 100 units/mL subcutaneous injection 1-6 Units, 1-6 Units, Subcutaneous, TID AC, 2 Units at 10/22/23 1659 **AND** Fingerstick Glucose (POCT), , , TID AC, Monie Britton MD    lidocaine (LIDODERM) 5 % patch 1 patch, 1 patch, Topical, Daily, OLIVIA Murray-C, 1 patch at 10/23/23 0824    metoprolol (LOPRESSOR) injection 2.5 mg, 2.5 mg, Intravenous, Q6H PRN, Chalino Britton MD    metoprolol tartrate (LOPRESSOR) tablet 25 mg, 25 mg, Oral, Q12H [address], Monie Britton MD, 25 mg at 10/23/23 5729    oxyCODONE (ROXICODONE) IR tablet 2.5 mg, 2.5 mg, Oral, Q6H PRN, Lyssa Aragon, PA-C    oxyCODONE (ROXICODONE) IR tablet 5 mg, 5 mg, Oral, Q6H PRN, Lyssa Aragon, PA-C, 5 mg at 10/22/23 1519    potassium chloride (K-DUR,KLOR-CON) CR tablet 20 mEq, 20 mEq, Oral, Daily, Monie Britton MD, 20 mEq at 10/23/23 0824     Labs & Results:        Results from last 7 days   Lab Units 10/22/23  0518 10/21/23  0508 10/20/23  1326   WBC Thousand/uL 6.48 9.07 8.99   HEMOGLOBIN g/dL 13.2 14.6 15.8   HEMATOCRIT % 41.9 46.6 51.1*   PLATELETS Thousands/uL 87* 91* 118*     Results from last 7 days   Lab Units 10/21/23  0508   TRIGLYCERIDES mg/dL 144   HDL mg/dL 33*     Results from last 7 days   Lab Units 10/23/23  0343 10/22/23  2113 10/22/23  0518 10/21/23  0508 10/20/23  0923   POTASSIUM mmol/L 3.5 3.8 3.7 4.5 5.2   CHLORIDE mmol/L 104 106 105 106 102   CO2 mmol/L 25 23 27 23 25   BUN mg/dL 40* 41* 41* 44* 44*   CREATININE mg/dL 1.57* 1.68* 1.71* 1.77* 1.88*   CALCIUM mg/dL 8.4 8.3* 8.3* 8.6 9.2   ALK PHOS U/L  --   -- 78 82 105*   ALT U/L  --   --  10 11 15   AST U/L  --   --  20 21 26     Results from last 7 days   Lab Units 10/23/23  0342 10/22/23  2113 10/22/23  1512 10/21/23  0626 10/20/23  1342   INR   --   --  1.28* 1.30* 1.24*   PTT seconds 148* 86* 29  --  26     Results from last 7 days   Lab Units 10/23/23  0343 10/22/23  0518 10/21/23  0508   MAGNESIUM mg/dL 1.9 2.0 2.2     Results from last 7 days   Lab Units 10/20/23  0923   BNP pg/mL 3,855*      Telemetry: atrial fibrillation, rate , primarily 70-80's overnight    Counseling / Coordination of Care  Total floor / unit time spent today 25 minutes. Greater than 50% of total time was spent with the patient and / or family counseling and / or coordination of care.   A description of the counseling / coordination of care: Discussed case with Fidel Walden PA-C.

## 2023-10-23 NOTE — CASE MANAGEMENT
Case Management Discharge Planning Note    Patient name Sp Garza  Location /-17 MRN [id number]  : 1940 Date 10/23/2023       Current Admission Date: 10/20/2023  Current Admission Diagnosis:Acute combined systolic and diastolic congestive heart failure [facility])   Patient Active Problem List    Diagnosis Date Noted    Goals of care, counseling/discussion 10/22/2023    Thrombocytopenia ([address]) 10/22/2023    Abnormal head CT 10/21/2023    Acute respiratory failure with hypoxia ([address]) 10/20/2023    Acute combined systolic and diastolic congestive heart failure ([address])     Alcoholic cirrhosis of liver with ascites ([address]) 10/20/2023    Atrial fibrillation with RVR ([address])     Acute metabolic encephalopathy     Stage 3 chronic kidney disease ([address]) 10/20/2023    Bladder tumor 10/20/2023    Type 2 diabetes mellitus with renal complication ([address])     Right foot ulcer ([address]) 10/20/2023      LOS (days): 3  Geometric Mean LOS (GMLOS) (days): 3.60  Days to GMLOS:0.6     OBJECTIVE:  Risk of Unplanned Readmission Score: 12.56         Current admission status: Inpatient   Preferred Pharmacy[place]   [facility] [place] [address]  Phone: [phone] Fax: [phone]    Primary Care Provider: No primary care provider on file. Primary Insurance: [employer] [facility] RE[facility]  Secondary Insurance:     DISCHARGE DETAILS:    CM discussing hospice care with pt and his wife, who is at bedside. The pt and his wife wish to take pt home on Hospice care, they do not have a preference to any hospice agency, CM placing a blanket referral in [place]               Pt has accepted [facility] for home hospice.   Liaison here to visit with patient           CM price checking pts prescribed Eliquis, as per pharmacy, pt has a $10 copay

## 2023-10-23 NOTE — PLAN OF CARE
Problem: Potential for Falls  Goal: Patient will remain free of falls  Description: INTERVENTIONS:  - Educate patient/family on patient safety including physical limitations  - Instruct patient to call for assistance with activity   - Consult OT/PT to assist with strengthening/mobility   - Keep Call bell within reach  - Keep bed low and locked with side rails adjusted as appropriate  - Keep care items and personal belongings within reach  - Initiate and maintain comfort rounds  - Make Fall Risk Sign visible to staff  - Offer Toileting every 2 Hours, in advance of need  - Initiate/Maintain bed alarm  - Obtain necessary fall risk management equipment  - Apply yellow socks and bracelet for high fall risk patients  - Consider moving patient to room near nurses station  Outcome: Progressing     Problem: NEUROSENSORY - ADULT  Goal: Achieves stable or improved neurological status  Description: INTERVENTIONS  - Monitor and report changes in neurological status  - Monitor vital signs such as temperature, blood pressure, glucose, and any other labs ordered   - Initiate measures to prevent increased intracranial pressure  - Monitor for seizure activity and implement precautions if appropriate      Outcome: Progressing  Goal: Achieves maximal functionality and self care  Description: INTERVENTIONS  - Monitor swallowing and airway patency with patient fatigue and changes in neurological status  - Encourage and assist patient to increase activity and self care.    - Encourage visually impaired, hearing impaired and aphasic patients to use assistive/communication devices  Outcome: Progressing     Problem: CARDIOVASCULAR - ADULT  Goal: Maintains optimal cardiac output and hemodynamic stability  Description: INTERVENTIONS:  - Monitor I/O, vital signs and rhythm  - Monitor for S/S and trends of decreased cardiac output  - Administer and titrate ordered vasoactive medications to optimize hemodynamic stability  - Assess quality of pulses, skin color and temperature  - Assess for signs of decreased coronary artery perfusion  - Instruct patient to report change in severity of symptoms  Outcome: Progressing  Goal: Absence of cardiac dysrhythmias or at baseline rhythm  Description: INTERVENTIONS:  - Continuous cardiac monitoring, vital signs, obtain 12 lead EKG if ordered  - Administer antiarrhythmic and heart rate control medications as ordered  - Monitor electrolytes and administer replacement therapy as ordered  Outcome: Progressing     Problem: RESPIRATORY - ADULT  Goal: Achieves optimal ventilation and oxygenation  Description: INTERVENTIONS:  - Assess for changes in respiratory status  - Assess for changes in mentation and behavior  - Position to facilitate oxygenation and minimize respiratory effort  - Oxygen administered by appropriate delivery if ordered  - Initiate smoking cessation education as indicated  - Encourage broncho-pulmonary hygiene including cough, deep breathe, Incentive Spirometry  - Assess the need for suctioning and aspirate as needed  - Assess and instruct to report SOB or any respiratory difficulty  - Respiratory Therapy support as indicated  Outcome: Progressing     Problem: GENITOURINARY - ADULT  Goal: Maintains or returns to baseline urinary function  Description: INTERVENTIONS:  - Assess urinary function  - Encourage oral fluids to ensure adequate hydration if ordered  - Administer IV fluids as ordered to ensure adequate hydration  - Administer ordered medications as needed  - Offer frequent toileting  - Follow urinary retention protocol if ordered  Outcome: Progressing  Goal: Absence of urinary retention  Description: INTERVENTIONS:  - Assess patient’s ability to void and empty bladder  - Monitor I/O  - Bladder scan as needed  - Discuss with physician/AP medications to alleviate retention as needed  - Discuss catheterization for long term situations as appropriate  Outcome: Progressing  Goal: Urinary catheter remains patent  Description: INTERVENTIONS:  - Assess patency of urinary catheter  - If patient has a chronic brian, consider changing catheter if non-functioning  - Follow guidelines for intermittent irrigation of non-functioning urinary catheter  Outcome: Progressing     Problem: METABOLIC, FLUID AND ELECTROLYTES - ADULT  Goal: Electrolytes maintained within normal limits  Description: INTERVENTIONS:  - Monitor labs and assess patient for signs and symptoms of electrolyte imbalances  - Administer electrolyte replacement as ordered  - Monitor response to electrolyte replacements, including repeat lab results as appropriate  - Instruct patient on fluid and nutrition as appropriate  Outcome: Progressing  Goal: Fluid balance maintained  Description: INTERVENTIONS:  - Monitor labs   - Monitor I/O and WT  - Instruct patient on fluid and nutrition as appropriate  - Assess for signs & symptoms of volume excess or deficit  Outcome: Progressing  Goal: Glucose maintained within target range  Description: INTERVENTIONS:  - Monitor Blood Glucose as ordered  - Assess for signs and symptoms of hyperglycemia and hypoglycemia  - Administer ordered medications to maintain glucose within target range  - Assess nutritional intake and initiate nutrition service referral as needed  Outcome: Progressing     Problem: SKIN/TISSUE INTEGRITY - ADULT  Goal: Skin Integrity remains intact(Skin Breakdown Prevention)  Description: Assess:  -Perform Hector assessment   -Clean and moisturize skin   -Inspect skin when repositioning, toileting, and assisting with ADLS  -Assess under medical devices   -Assess extremities for adequate circulation and sensation     Bed Management:  -Have minimal linens on bed & keep smooth, unwrinkled  -Change linens as needed when moist or perspiring  -Avoid sitting or lying in one position   -Keep HOB at 30 degrees     Toileting:  -Offer bedside commode  -Assess for incontinence   -Use incontinent care products after each incontinent episode     Activity:  -Mobilize patient   -Encourage activity and walks on unit  -Encourage or provide ROM exercises   -Turn and reposition patient every 2 Hours  -Use appropriate equipment to lift or move patient in bed  -Instruct/ Assist with weight shifting every 2 when out of bed in chair  -Consider limitation of chair    Skin Care:  -Avoid use of baby powder, tape, friction and shearing, hot water or constrictive clothing  -Relieve pressure over bony prominences   -Do not massage red bony areas    Next Steps:  -Teach patient strategies to minimize risks    -Consider consults to  interdisciplinary teams   Outcome: Progressing  Goal: Incision(s), wounds(s) or drain site(s) healing without S/S of infection  Description: INTERVENTIONS  - Assess and document dressing, incision, wound bed, drain sites and surrounding tissue  - Provide patient and family education  - Perform skin care/dressing changes   Outcome: Progressing  Goal: Pressure injury heals and does not worsen  Description: Interventions:  - Implement low air loss mattress or specialty surface (Criteria met)  - Apply silicone foam dressing  - Instruct/assist with weight shifting   - Limit chair time  - Use special pressure reducing interventions when in chair   - Apply fecal or urinary incontinence containment device   - Perform passive or active ROM   - Turn and reposition patient & offload bony prominences  - Utilize friction reducing device or surface for transfers   - Consider consults to  interdisciplinary teams   - Use incontinent care products after each incontinent episode   - Consider nutrition services referral as needed  Outcome: Progressing     Problem: MUSCULOSKELETAL - ADULT  Goal: Maintain or return mobility to safest level of function  Description: INTERVENTIONS:  - Assess patient's ability to carry out ADLs; assess patient's baseline for ADL function and identify physical deficits which impact ability to perform ADLs (bathing, care of mouth/teeth, toileting, grooming, dressing, etc.)  - Assess/evaluate cause of self-care deficits   - Assess range of motion  - Assess patient's mobility  - Assess patient's need for assistive devices and provide as appropriate  - Encourage maximum independence but intervene and supervise when necessary  - Involve family in performance of ADLs  - Assess for home care needs following discharge   - Consider OT consult to assist with ADL evaluation and planning for discharge  - Provide patient education as appropriate  Outcome: Progressing  Goal: Maintain proper alignment of affected body part  Description: INTERVENTIONS:  - Support, maintain and protect limb and body alignment  - Provide patient/ family with appropriate education  Outcome: Progressing     Problem: MOBILITY - ADULT  Goal: Maintain or return to baseline ADL function  Description: INTERVENTIONS:  -  Assess patient's ability to carry out ADLs; assess patient's baseline for ADL function and identify physical deficits which impact ability to perform ADLs (bathing, care of mouth/teeth, toileting, grooming, dressing, etc.)  - Assess/evaluate cause of self-care deficits   - Assess range of motion  - Assess patient's mobility; develop plan if impaired  - Assess patient's need for assistive devices and provide as appropriate  - Encourage maximum independence but intervene and supervise when necessary  - Involve family in performance of ADLs  - Assess for home care needs following discharge   - Consider OT consult to assist with ADL evaluation and planning for discharge  - Provide patient education as appropriate  Outcome: Progressing  Goal: Maintains/Returns to pre admission functional level  Description: INTERVENTIONS:  - Perform BMAT or MOVE assessment daily.   - Set and communicate daily mobility goal to care team and patient/family/caregiver.    - Collaborate with rehabilitation services on mobility goals if consulted  - Record patient progress and toleration of activity level   Outcome: Progressing     Problem: Prexisting or High Potential for Compromised Skin Integrity  Goal: Skin integrity is maintained or improved  Description: INTERVENTIONS:  - Identify patients at risk for skin breakdown  - Assess and monitor skin integrity  - Assess and monitor nutrition and hydration status  - Monitor labs   - Assess for incontinence   - Turn and reposition patient  - Assist with mobility/ambulation  - Relieve pressure over bony prominences  - Avoid friction and shearing  - Provide appropriate hygiene as needed including keeping skin clean and dry  - Evaluate need for skin moisturizer/barrier cream  - Collaborate with interdisciplinary team   - Patient/family teaching  - Consider wound care consult   Outcome: Progressing     Problem: Nutrition/Hydration-ADULT  Goal: Nutrient/Hydration intake appropriate for improving, restoring or maintaining nutritional needs  Description: Monitor and assess patient's nutrition/hydration status for malnutrition. Collaborate with interdisciplinary team and initiate plan and interventions as ordered. Monitor patient's weight and dietary intake as ordered or per policy. Utilize nutrition screening tool and intervene as necessary. Determine patient's food preferences and provide high-protein, high-caloric foods as appropriate.      INTERVENTIONS:  - Monitor oral intake, urinary output, labs, and treatment plans  - Assess nutrition and hydration status and recommend course of action  - Evaluate amount of meals eaten  - Assist patient with eating if necessary   - Allow adequate time for meals  - Recommend/ encourage appropriate diets, oral nutritional supplements, and vitamin/mineral supplements  - Order, calculate, and assess calorie counts as needed  - Recommend, monitor, and adjust tube feedings and TPN/PPN based on assessed needs  - Assess need for intravenous fluids  - Provide specific nutrition/hydration education as appropriate  - Include patient/family/caregiver in decisions related to nutrition  Outcome: Progressing

## 2023-10-23 NOTE — PLAN OF CARE
Problem: Potential for Falls  Goal: Patient will remain free of falls  Description: INTERVENTIONS:  - Educate patient/family on patient safety including physical limitations  - Instruct patient to call for assistance with activity   - Consult OT/PT to assist with strengthening/mobility   - Keep Call bell within reach  - Keep bed low and locked with side rails adjusted as appropriate  - Keep care items and personal belongings within reach  - Initiate and maintain comfort rounds  - Make Fall Risk Sign visible to staff  - Offer Toileting every 2 Hours, in advance of need  - Initiate/Maintain bed/chair alarm  - Obtain necessary fall risk management equipment: alarms  - Apply yellow socks and bracelet for high fall risk patients  - Consider moving patient to room near nurses station  Outcome: Progressing     Problem: NEUROSENSORY - ADULT  Goal: Achieves stable or improved neurological status  Description: INTERVENTIONS  - Monitor and report changes in neurological status  - Monitor vital signs such as temperature, blood pressure, glucose, and any other labs ordered   - Initiate measures to prevent increased intracranial pressure  - Monitor for seizure activity and implement precautions if appropriate      Outcome: Progressing  Goal: Achieves maximal functionality and self care  Description: INTERVENTIONS  - Monitor swallowing and airway patency with patient fatigue and changes in neurological status  - Encourage and assist patient to increase activity and self care.    - Encourage visually impaired, hearing impaired and aphasic patients to use assistive/communication devices  Outcome: Progressing     Problem: CARDIOVASCULAR - ADULT  Goal: Maintains optimal cardiac output and hemodynamic stability  Description: INTERVENTIONS:  - Monitor I/O, vital signs and rhythm  - Monitor for S/S and trends of decreased cardiac output  - Administer and titrate ordered vasoactive medications to optimize hemodynamic stability  - Assess quality of pulses, skin color and temperature  - Assess for signs of decreased coronary artery perfusion  - Instruct patient to report change in severity of symptoms  Outcome: Progressing  Goal: Absence of cardiac dysrhythmias or at baseline rhythm  Description: INTERVENTIONS:  - Continuous cardiac monitoring, vital signs, obtain 12 lead EKG if ordered  - Administer antiarrhythmic and heart rate control medications as ordered  - Monitor electrolytes and administer replacement therapy as ordered  Outcome: Progressing     Problem: RESPIRATORY - ADULT  Goal: Achieves optimal ventilation and oxygenation  Description: INTERVENTIONS:  - Assess for changes in respiratory status  - Assess for changes in mentation and behavior  - Position to facilitate oxygenation and minimize respiratory effort  - Oxygen administered by appropriate delivery if ordered  - Initiate smoking cessation education as indicated  - Encourage broncho-pulmonary hygiene including cough, deep breathe, Incentive Spirometry  - Assess the need for suctioning and aspirate as needed  - Assess and instruct to report SOB or any respiratory difficulty  - Respiratory Therapy support as indicated  Outcome: Progressing     Problem: GENITOURINARY - ADULT  Goal: Maintains or returns to baseline urinary function  Description: INTERVENTIONS:  - Assess urinary function  - Encourage oral fluids to ensure adequate hydration if ordered  - Administer IV fluids as ordered to ensure adequate hydration  - Administer ordered medications as needed  - Offer frequent toileting  - Follow urinary retention protocol if ordered  Outcome: Progressing  Goal: Absence of urinary retention  Description: INTERVENTIONS:  - Assess patient’s ability to void and empty bladder  - Monitor I/O  - Bladder scan as needed  - Discuss with physician/AP medications to alleviate retention as needed  - Discuss catheterization for long term situations as appropriate  Outcome: Progressing  Goal: Urinary catheter remains patent  Description: INTERVENTIONS:  - Assess patency of urinary catheter  - If patient has a chronic brian, consider changing catheter if non-functioning  - Follow guidelines for intermittent irrigation of non-functioning urinary catheter  Outcome: Progressing     Problem: METABOLIC, FLUID AND ELECTROLYTES - ADULT  Goal: Electrolytes maintained within normal limits  Description: INTERVENTIONS:  - Monitor labs and assess patient for signs and symptoms of electrolyte imbalances  - Administer electrolyte replacement as ordered  - Monitor response to electrolyte replacements, including repeat lab results as appropriate  - Instruct patient on fluid and nutrition as appropriate  Outcome: Progressing  Goal: Fluid balance maintained  Description: INTERVENTIONS:  - Monitor labs   - Monitor I/O and WT  - Instruct patient on fluid and nutrition as appropriate  - Assess for signs & symptoms of volume excess or deficit  Outcome: Progressing  Goal: Glucose maintained within target range  Description: INTERVENTIONS:  - Monitor Blood Glucose as ordered  - Assess for signs and symptoms of hyperglycemia and hypoglycemia  - Administer ordered medications to maintain glucose within target range  - Assess nutritional intake and initiate nutrition service referral as needed  Outcome: Progressing     Problem: SKIN/TISSUE INTEGRITY - ADULT  Goal: Skin Integrity remains intact(Skin Breakdown Prevention)  Description: Assess:  -Perform Hector assessment every shift  -Clean and moisturize skin every shift  -Inspect skin when repositioning, toileting, and assisting with ADLS  -Assess under medical devices such as foam every shift  -Assess extremities for adequate circulation and sensation     Bed Management:  -Have minimal linens on bed & keep smooth, unwrinkled  -Change linens as needed when moist or perspiring  -Avoid sitting or lying in one position for more than 2 hours while in bed  -Keep HOB at 90 degrees Toileting:  -Offer bedside commode  -Assess for incontinence every shift  -Use incontinent care products after each incontinent episode such as wipes    Activity:  -Mobilize patient TID times a day  -Encourage activity and walks on unit  -Encourage or provide ROM exercises   -Turn and reposition patient every 2 Hours  -Use appropriate equipment to lift or move patient in bed  -Instruct/ Assist with weight shifting every 1hr when out of bed in chair  -Consider limitation of chair time 1 hour intervals    Skin Care:  -Avoid use of baby powder, tape, friction and shearing, hot water or constrictive clothing  -Do not massage red bony areas    Next Steps:  Outcome: Progressing  Goal: Incision(s), wounds(s) or drain site(s) healing without S/S of infection  Description: INTERVENTIONS  - Assess and document dressing, incision, wound bed, drain sites and surrounding tissue  - Provide patient and family education  - Perform skin care/dressing changes every shift  Outcome: Progressing  Goal: Pressure injury heals and does not worsen  Description: Interventions:  - Implement low air loss mattress or specialty surface (Criteria met)  - Apply silicone foam dressing  - Instruct/assist with weight shifting every 90 minutes when in chair   - Limit chair time to 1 hour intervals  - Use special pressure reducing interventions such as cushion when in chair   - Apply fecal or urinary incontinence containment device   - Perform passive or active ROM every shift  - Turn and reposition patient & offload bony prominences every 2 hours   - Utilize friction reducing device or surface for transfers   - Consider nutrition services referral as needed  Outcome: Progressing     Problem: MUSCULOSKELETAL - ADULT  Goal: Maintain or return mobility to safest level of function  Description: INTERVENTIONS:  - Assess patient's ability to carry out ADLs; assess patient's baseline for ADL function and identify physical deficits which impact ability to perform ADLs (bathing, care of mouth/teeth, toileting, grooming, dressing, etc.)  - Assess/evaluate cause of self-care deficits   - Assess range of motion  - Assess patient's mobility  - Assess patient's need for assistive devices and provide as appropriate  - Encourage maximum independence but intervene and supervise when necessary  - Involve family in performance of ADLs  - Assess for home care needs following discharge   - Consider OT consult to assist with ADL evaluation and planning for discharge  - Provide patient education as appropriate  Outcome: Progressing  Goal: Maintain proper alignment of affected body part  Description: INTERVENTIONS:  - Support, maintain and protect limb and body alignment  - Provide patient/ family with appropriate education  Outcome: Progressing     Problem: MOBILITY - ADULT  Goal: Maintain or return to baseline ADL function  Description: INTERVENTIONS:  -  Assess patient's ability to carry out ADLs; assess patient's baseline for ADL function and identify physical deficits which impact ability to perform ADLs (bathing, care of mouth/teeth, toileting, grooming, dressing, etc.)  - Assess/evaluate cause of self-care deficits   - Assess range of motion  - Assess patient's mobility; develop plan if impaired  - Assess patient's need for assistive devices and provide as appropriate  - Encourage maximum independence but intervene and supervise when necessary  - Involve family in performance of ADLs  - Assess for home care needs following discharge   - Consider OT consult to assist with ADL evaluation and planning for discharge  - Provide patient education as appropriate  Outcome: Progressing  Goal: Maintains/Returns to pre admission functional level  Description: INTERVENTIONS:  - Perform BMAT or MOVE assessment daily.   - Set and communicate daily mobility goal to care team and patient/family/caregiver.    - Collaborate with rehabilitation services on mobility goals if consulted  - Perform Range of Motion TID times a day. - Reposition patient every 2 hours. - Out of bed for toileting  - Record patient progress and toleration of activity level   Outcome: Progressing     Problem: Prexisting or High Potential for Compromised Skin Integrity  Goal: Skin integrity is maintained or improved  Description: INTERVENTIONS:  - Identify patients at risk for skin breakdown  - Assess and monitor skin integrity  - Assess and monitor nutrition and hydration status  - Monitor labs   - Assess for incontinence   - Turn and reposition patient  - Assist with mobility/ambulation  - Relieve pressure over bony prominences  - Avoid friction and shearing  - Provide appropriate hygiene as needed including keeping skin clean and dry  - Evaluate need for skin moisturizer/barrier cream  - Collaborate with interdisciplinary team   - Patient/family teaching  - Consider wound care consult   Outcome: Progressing     Problem: Nutrition/Hydration-ADULT  Goal: Nutrient/Hydration intake appropriate for improving, restoring or maintaining nutritional needs  Description: Monitor and assess patient's nutrition/hydration status for malnutrition. Collaborate with interdisciplinary team and initiate plan and interventions as ordered. Monitor patient's weight and dietary intake as ordered or per policy. Utilize nutrition screening tool and intervene as necessary. Determine patient's food preferences and provide high-protein, high-caloric foods as appropriate.      INTERVENTIONS:  - Monitor oral intake, urinary output, labs, and treatment plans  - Assess nutrition and hydration status and recommend course of action  - Evaluate amount of meals eaten  - Assist patient with eating if necessary   - Allow adequate time for meals  - Recommend/ encourage appropriate diets, oral nutritional supplements, and vitamin/mineral supplements  - Order, calculate, and assess calorie counts as needed  - Recommend, monitor, and adjust tube feedings and TPN/PPN based on assessed needs  - Assess need for intravenous fluids  - Provide specific nutrition/hydration education as appropriate  - Include patient/family/caregiver in decisions related to nutrition  Outcome: Progressing

## 2023-10-23 NOTE — PHYSICAL THERAPY NOTE
PHYSICAL THERAPY NOTE          Patient Name: Emely Brown  [id number] Date: 10/23/2023       10/23/23 1038   Note Type   Note type Evaluation; Cancelled Session   Cancel Reasons Medical status       Received order for PT consult. Chart reviewed. Pt admitted with diagnosis acute combined systolic and diastolic congestive heart failure. Spoke with Dago England PA-C regarding plan for patient as notes indicate home with hospice. Home with hospice was confirmed, okay to cancel and d/c PT services. Will D/C PT consult at this time. Should patient's status/plan change. Please re-consult.      Sukhwinder Preston, PT,DPT

## 2023-10-23 NOTE — NURSING NOTE
Pt removed midline IV stating "I thought I was driving. I must have been dreaming." Replaced with 20g IV to RFA.

## 2023-10-23 NOTE — PROGRESS NOTES
Patient:    MRN:  [id number]    Alysain Request ID:  [id number]    Level of care reserved:  Hospice    Partner Reserved:  [facility] Deja Perez, [facility] Fahad Ng [phone]    Clinical needs requested:    Geography searched:  [id number]    Start of Service:    Request sent:  10:23am EDT on 10/23/2023 by Mamie Liu    Partner reserved:  11:56am EDT on 10/23/2023 by Mamie Liu    Choice list shared:

## 2023-10-23 NOTE — PROGRESS NOTES
[address] [facility]  Progress Note  Name: Lisseth Stevenson  MRN: [id number]  Unit/Bed#: -18 I Date of Admission: 10/20/2023   Date of Service: 10/23/2023 I Hospital Day: 3    Assessment/Plan   * Acute combined systolic and diastolic congestive heart failure (HCC)  Assessment & Plan  Wt Readings from Last 3 Encounters:   10/22/23 101 kg (223 lb 12.3 oz)     Patient presents with worsening shortness of breath abdominal distention and lower extremity edema. Has no known prior history of congestive heart failure however has however has not seen a physician in 3 years and had stopped taking all his medications since February 2023  CT scan of the chest shows bilateral large pleural effusion, cardiomegaly, large ascites  Wife at bedside reports increased weight gain  Patient hypoxic requiring 3 L of supplemental oxygen via nasal cannula - has been weaned to room air  Will initiate treatment with IV furosemide 40 mg twice daily. -Patient is net -3 L today morning   Monitor daily weight and intake and output. Continue with current diuresis  Echo: Ejection fraction is 25 -29% with diffuse global hypokinesis. Noted to have moderate to severe TR and severe MR. Patient's wife reported that she talk to cardiology yesterday and cardiology gave him a very poor prognosis. -Patient is not a surgical candidate. Wife is considering hospice-we will consult hospice through case management  Cardiology: start lasix gtt 10 mg/hr, depending on results could consider diuril 1000 mg IVP daily, monitor BMP with K and mg repletion, increase lopressor if BP can tolerate and for HR management  Unfortunately, lasix gtt was not started, started on 10/22 - assess response. Right foot ulcer ([address] [facility])  Assessment & Plan  Has chronic ulcer right foot. Has not seen a podiatrist or undergone any imaging.   Follow-up on lower extremity arterial Doppler study as patient does have bilateral lower extremity edema and diminished pulses. No DVT on Doppler  XR right foot: Erosive changes of the head of the second proximal phalanx and base of the fifth middle phalanx appearing in the area of patient's wound consistent with septic arthropathy and osteomyelitis. Arterial duplex and MRI initially ordered, d/c as patient's wife, daughter, and son would not want any treatment of infection of the foot with IV antibiotics or amputation. Podiatry consult discontinued  Discussed with patient's spouse, son, and daughter - they would NOT want patient on antibiotics and would not want any amputation. They are planning on doing hospice on discharge. Family does not want evaluation by podiatry at this time as it will not change their decision in being interested in comfort for patient. Only wanting to continue with lasix gtt at this time. Discussed that if patient does have infection of the bone, it is important to start IV antibiotics to prevent sepsis, septic shock, loss of limb, death - patient's wife, daughter, and son verbalized understanding of not starting antibiotics and all agreeable to not start at this time. Goals of care, counseling/discussion  Assessment & Plan  Patient's spouse Tacho Ng, daughter, and son present at bedside and discussing goals of care with patient. She states that he would not want to be living like this and would like patient to go home on hospice when as stable as possible. Would like to try and get the fluid off him and then have him come back home. Does not want any extensive measures taking or any procedures. Does not want IV antibiotics or treatment of osteomyelitis. Discussed that patient can progress to sepsis, septic shock, have limb loss, and death sooner due to not treating active infection, patient's wife verbalized understanding stating "he has had this toe wound for so long and was likely infected the whole time, but we don't want any antibiotics". She does not want podiatry consult either.    She would like patient to  be comfortable, biggest concern is getting more of the fluid off him so he can be comfortable for discharge, but would not want anything further after discharge from the hospital. [facility] with heparin at this time to prevent stroke while inpatient. Discussed with cardiology at bedside regarding patient's anticoagulation status - would like to be discharged with anticoagulation at this time. Would not want patient having a stroke. Eliquis 2.5 mg bid sent to pharmacy for price check. Will consult CM for hospice assistance for when patient stable for discharge. POLST complete and in patient's chart  See acp note    Atrial fibrillation with RVR ([address])  Assessment & Plan  Present admission with heart rate in the 120s. Received 1 dose of IV Cardizem in the emergency room. Subsequently heart rate varying between 100-1 10. Continue with telemetry monitoring  Initiate Lopressor 25mg twice daily  [id number] score 4  Discussed with wife about anticoagulation-plan was for anticoagulation. CT head is concerning for tiny subdural hemorrhage. Anticoagulation was on hold. But on repeat imaging it remained stable. Case was discussed with neurosurgery physician assistant. Cleared for anticoagulation if medically necessary. Discussed with the wife. Wife reported that she would like to hold off on anticoagulation. She reported that she talked to cardiology and cardiology gave her very poor prognosis for him and he is not a surgical candidate. He understand his prognosis and would like to hold anticoagulation for now. Risk and benefit of anticoagulation versus known anticoagulation was explained to the wife- wife, daughter, and son are ok with heparin while inpatient, initially were not interested in [facility] [place] [facility] after discharge, but family thought more on this and would like to do eliquis on discharge for prevention of stroke. They understand the risk of bleeding associated and would like to proceed.   Eliquis 2.5mg bid sent to pharmacy for price check     Acute metabolic encephalopathy  Assessment & Plan  Wife reports worsening confusion over the last several weeks  No asterixis noted. Neuro exam nonfocal  Likely multifactorial in the setting of hypoxia related to heart failure. However in view of new onset atrial fibrillation cannot rule out acute CVA or hyperammonemia in the setting of cirrhosis seen on imaging. Continue with serial neurochecks  CT head reviewed. Questionable small tiny subdural versus dural fold on the CT. Discussed with neurosurgery. Stable for anticoagulation since the repeat CAT scan is stable. Alcoholic cirrhosis of liver with ascites [facility])  Assessment & Plan  Seen on CT scan of the abdomen. Associated with large ascites. Will need paracentesis. 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 Follow-up on abdominal ultrasound: Abnormal liver compatible with hepatic cirrhosis. No evidence of liver mass. Moderate ascites. Bilateral renal atrophy. No hydronephrosis or perinephric collection. Cholelithiasis without evidence of acute cholecystitis. Wife reported some confusion. Follow-up on ammonia-ammonia and chronic hepatitis panel is negative    Thrombocytopenia ([address])  Assessment & Plan  Noted to have thrombocytopenia on labs. No evidence of active or acute bleeding. Starting on heparin gtt due to a fib history. Patient's wife agreeable to [facility] at this time. Will monitor     Abnormal head CT  Assessment & Plan  Patient had a CAT scan done as part of his work-up for encephalopathy. Noted to have High dense focus along the right falx -dural calcification or tiny subdural hemorrhage . Repeat CAT scan showed stability. Discussed with neurosurgery.   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 However he has been off all his medications since February 2023. Follow-up on hemoglobin A1c and start sliding scale insulin. May need titration and adjustment of insulin regimen based on blood glucose control. Bladder tumor  Assessment & Plan  Bladder tumor s/pTURBT in 2020. Has not subsequently followed up with urologist.  Found to have urinary retention. Brian catheter placed. Urinalysis with microscopy negative for hematuria. Family would like to rediscuss brian catheter closer to discharge if they would like him leaving with this or removal prior to discharge. Stage 3 chronic kidney disease [facility])  Assessment & Plan  Lab Results   Component Value Date    EGFR 35 10/22/2023    EGFR 34 10/21/2023    EGFR 31 10/20/2023    CREATININE 1.71 (H) 10/22/2023    CREATININE 1.77 (H) 10/21/2023    CREATININE 1.88 (H) 10/20/2023   Likely secondary to diabetic nephropathy. Baseline creatinine 1.6. Creatinine 1.88 on the day of admission. Continue to monitor renal function closely on current diuretic regimen. Patient with urinary retention. Status post Brian catheter placed  Creatinine today is 1.77. Monitor creatinine with diuresis. Monitor BID BMP with lasix gtt. Acute respiratory failure with hypoxia (HCC)-resolved as of 10/23/2023  Assessment & Plan  On admission with oxygen saturation 85% on room air at rest.  Currently on 3 L of supplemental oxygen via nasal cannula. Wean and titrate as tolerated to keep saturation between 88 to 94%. Likely in the setting of underlying congestive heart failure with large bilateral pleural effusions. Patient is currently on room air. Discussed with critical care. Hold on thoracocentesis for now. Monitor respiratory status  Remains on room air, started on lasix gtt. VTE Pharmacologic Prophylaxis: VTE Score: 5 High Risk (Score >/= 5) - Pharmacological DVT Prophylaxis Ordered: heparin drip. Sequential Compression Devices Ordered. Patient Centered Rounds: I performed bedside rounds with nursing staff today. Discussions with Specialists or Other Care Team Provider: nursing, case management, cardiology    Education and Discussions with Family / Patient: Updated  (wife) at bedside. Total Time Spent on Date of Encounter in care of patient: 45 mins. This time was spent on one or more of the following: performing physical exam; counseling and coordination of care; obtaining or reviewing history; documenting in the medical record; reviewing/ordering tests, medications or procedures; communicating with other healthcare professionals and discussing with patient's family/caregivers. Current Length of Stay: 3 day(s)  Current Patient Status: Inpatient   Certification Statement: The patient will continue to require additional inpatient hospital stay due to acute CHF exacerbation  Discharge Plan: Anticipate discharge in >72 hrs to home with hospice on discharge    Code Status: Level 3 - DNAR and DNI    Subjective:   Patient seen and examined this morning. He states that he feels better today. Not short of breath. Feels that his breathing is improved. Does feel that he is tired. Coughing and occasionally producing sputum. Denies nausea, vomiting, diarrhea, constipation, abdominal pain, CP, Sob. Objective:     Vitals:   Temp (24hrs), Av.4 °F (36.3 °C), Min:97 °F (36.1 °C), Max:97.9 °F (36.6 °C)    Temp:  [97 °F (36.1 °C)-97.9 °F (36.6 °C)] 97 °F (36.1 °C)  HR:  [] 100  Resp:  [16-18] 18  BP: (114-129)/(71-81) 121/81  SpO2:  [93 %-98 %] 95 %  Body mass index is 27.83 kg/m².      Input and Output Summary (last 24 hours): Intake/Output Summary (Last 24 hours) at 10/23/2023 1233  Last data filed at 10/23/2023 0620  Gross per 24 hour   Intake 360 ml   Output 3625 ml   Net -3265 ml       Physical Exam:   Physical Exam  Vitals reviewed. Constitutional:       General: He is not in acute distress. Appearance: He is ill-appearing. He is not toxic-appearing. HENT:      Head: Normocephalic and atraumatic. Mouth/Throat:      Mouth: Mucous membranes are moist.   Eyes:      Pupils: Pupils are equal, round, and reactive to light. Cardiovascular:      Rate and Rhythm: Normal rate. Rhythm irregular. Heart sounds: Murmur heard. Comments:  Poor pulses present on bilateral lower extremities  Pulmonary:      Effort: No respiratory distress. Breath sounds: No stridor. Rales present. No wheezing or rhonchi. Comments: Faint crackles heard bilaterally. Saturating well on room air  Abdominal:      General: There is distension. Palpations: There is no mass. Tenderness: There is no abdominal tenderness. Genitourinary:     Comments: Melchor in place with clear yellow urine output  Musculoskeletal:      Right lower leg: Edema present. Left lower leg: Edema present. Skin:     General: Skin is warm and dry. Findings: Erythema (left anticubital area with erythema) and lesion (2nd right toe wound) present. Neurological:      Mental Status: He is alert and oriented to person, place, and time. Comments:  Answers orientation questions appropriately, but is not overall oriented   Psychiatric:         Mood and Affect: Mood normal.         Behavior: Behavior normal.          Additional Data:     Labs:  Results from last 7 days   Lab Units 10/23/23  1156 10/22/23  0518 10/21/23  0508 10/20/23  1326 10/20/23  0923   WBC Thousand/uL 6.98   < > 9.07   < > 9.02   HEMOGLOBIN g/dL 15.7   < > 14.6   < > 17.1*   HEMATOCRIT % 50.0*   < > 46.6   < > 55.7*   PLATELETS Thousands/uL 116*   < > 91*   < > 141* NEUTROS PCT %  --   --   --   --  77*   LYMPHS PCT %  --   --   --   --  13*   LYMPHO PCT %  --   --  10*  --   --    MONOS PCT %  --   --   --   --  7   MONO PCT %  --   --  7  --   --    EOS PCT %  --   --  1  --  2    < > = values in this interval not displayed. Results from last 7 days   Lab Units 10/23/23  0343 10/22/23  2113 10/22/23  0518   SODIUM mmol/L 141   < > 140   POTASSIUM mmol/L 3.5   < > 3.7   CHLORIDE mmol/L 104   < > 105   CO2 mmol/L 25   < > 27   BUN mg/dL 40*   < > 41*   CREATININE mg/dL 1.57*   < > 1.71*   ANION GAP mmol/L 12   < > 8   CALCIUM mg/dL 8.4   < > 8.3*   ALBUMIN g/dL  --   --  2.9*   TOTAL BILIRUBIN mg/dL  --   --  1.01*   ALK PHOS U/L  --   --  78   ALT U/L  --   --  10   AST U/L  --   --  20   GLUCOSE RANDOM mg/dL 113   < > 106    < > = values in this interval not displayed. Results from last 7 days   Lab Units 10/22/23  1512   INR  1.28*     Results from last 7 days   Lab Units 10/23/23  1053 10/23/23  0709 10/22/23  2102 10/22/23  1620 10/22/23  1117 10/22/23  0728 10/21/23  2054 10/21/23  1647 10/21/23  1144 10/21/23  0734 10/20/23  2223 10/20/23  1649   POC GLUCOSE mg/dl 201* 140 146* 206* 177* 117 119 155* 184* 115 154* 145*     Results from last 7 days   Lab Units 10/20/23  1342   HEMOGLOBIN A1C % 7.5*     Results from last 7 days   Lab Units 10/21/23  0508 10/20/23  1158 10/20/23  0923   LACTIC ACID mmol/L  --  2.2* 4.0*   PROCALCITONIN ng/ml 0.32*  --  0.21       Lines/Drains:  Invasive Devices       Peripheral Intravenous Line  Duration             Long-Dwell Peripheral IV (Midline) 51/96/66 Right Cephalic Vein 2 days              Drain  Duration             Urethral Catheter 18 Fr. 2 days                  Urinary Catheter:  Goal for removal:  Continue with brian for I&Os at this time, patient's family unsure if they would like on discharge when going home with hospice and will revisit this.             Telemetry:  Telemetry Orders (From admission, onward) 24 Hour Telemetry Monitoring  Continuous x 24 Hours (Telem)        Question:  Reason for 24 Hour Telemetry  Answer:  Arrhythmias requiring acute medical intervention / PPM or ICD malfunction                     Telemetry Reviewed: Atrial fibrillation. HR averaging 70  Indication for Continued Telemetry Use: Acute CHF on >200 mg lasix/day or equivalent dose or with new reduced EF. Imaging: Reviewed radiology reports from this admission including: VAS arterial duplex, US abdomen, XR chest, CT head    Recent Cultures (last 7 days):   Results from last 7 days   Lab Units 10/20/23  0930 10/20/23  0923   BLOOD CULTURE  No Growth at 48 hrs. No Growth at 48 hrs. Last 24 Hours Medication List:   Current Facility-Administered Medications   Medication Dose Route Frequency Provider Last Rate    acetaminophen  650 mg Oral Q6H PRN Todd Britton MD      furosemide  10 mg/hr Intravenous Continuous Cheryle Flesher, PA-C 10 mg/hr (10/23/23 0840)    heparin (porcine)  3-20 Units/kg/hr (Order-Specific) Intravenous Titrated Cheryle Flesher, PA-C 8.1 Units/kg/hr (10/23/23 0617)    heparin (porcine)  2,000 Units Intravenous Q6H PRN Halina Aragon PA-C      heparin (porcine)  4,000 Units Intravenous Q6H PRN Halina Aragon PA-C      insulin lispro  1-5 Units Subcutaneous HS Monie Britton MD      insulin lispro  1-6 Units Subcutaneous TID AC Monie Britton MD      lidocaine  1 patch Topical Daily Irina Aragon PA-C      metoprolol  2.5 mg Intravenous Q6H PRN Monie Britton MD      metoprolol tartrate  25 mg Oral Q12H [facility] Monie Britton MD      oxyCODONE  2.5 mg Oral Q6H PRN Cheryle Flesher, PA-C      oxyCODONE  5 mg Oral Q6H PRN Halina Aragon PA-C      potassium chloride  20 mEq Oral Daily Lalitha Israel MD          Today, Patient Was Seen By: Cheryle Flesher, PA-C    **Please Note: This note may have been constructed using a voice recognition system. **

## 2023-10-23 NOTE — WOUND OSTOMY CARE
Consult Note - Wound   Guido Gillette 78 y.o. male MRN: [id number]  Unit/Bed#: -01 Encounter: [id number]        History and Present Illness:  78year old male PMH cancer, CAD presented to ED with edema and shortness of breath. Oxygen saturation 85% on RA at rest.PMH:Type 2 Dm,right foot ulcer,stage 2 CKD,bladder tumor. thrombocytopenia,abnormal head CT. Per medical record family decision for home with hospice care. Assessment Findings:  Seen for initial wound consult. Supine in bed, pt family member present. 1)Stage 2 on right buttocks. Pt has dry intact brown callous tissue both buttocks and partial thickness skin loss revealing 100% pink wound bed. Pt admits to not repositioning off of sacrum. He was agreeable at this time. Areas cleansed, Allevyn bordered foam applied. Green foam wedges to position off of sacrum, Encouraged pt to continue to promote healing of wounds. 2)Left great toe, medial aspect. Dry intact black tissue. 3)Left posterior tibial  100% tan intact slough  4)Right foot second toe dry abrasion  5)Right lateral malleolus 100 % tan slough adhered to wound bed    No induration, fluctuance, odor, warmth/temperature differences, redness, or purulence noted to the above noted wounds and skin areas assessed. New dressings applied per orders listed below. Patient tolerated well- no s/s of non-verbal pain or discomfort observed during the encounter. Bedside nurse aware of plan of care. See flow sheets for more detailed assessment findings. Wound care will continue to follow. Wounds:  Wound 10/20/23 Traumatic Abrasion(s) Toe (Comment  which one) Anterior;Right (Active)   Wound Image   10/23/23 1334   Wound Description Dry; Intact; Beefy red;Brown 10/23/23 1334   Kate-wound Assessment Dry; Intact 10/23/23 1334   Wound Length (cm) 0.5 cm 10/23/23 1334   Wound Width (cm) 1.5 cm 10/23/23 1334   Wound Surface Area (cm^2) 0.75 cm^2 10/23/23 1334   Drainage Amount None 10/23/23 1334   Non-staged Wound Description Partial thickness 10/23/23 1334   Treatments Cleansed;Site care;Elevated 10/23/23 1334   Dressing Open to air 10/23/23 1334   Dressing Changed New 10/23/23 1334   Patient Tolerance Tolerated well 10/23/23 1334   Dressing Status Remoistened 10/23/23 1334       Wound 10/20/23 Diabetic Ulcer Toe (Comment  which one) Anterior; Left (Active)   Wound Image   10/23/23 1332   Wound Description Clean;Dry; Intact; Black 10/23/23 1332   Kate-wound Assessment Dry;Clean 10/23/23 1332   Wound Length (cm) 0.3 cm 10/23/23 1332   Wound Width (cm) 0.3 cm 10/23/23 1332   Wound Surface Area (cm^2) 0.09 cm^2 10/23/23 1332   Drainage Amount None 10/23/23 1332   Treatments Cleansed;Site care;Elevated 10/23/23 1332   Dressing Open to air 10/23/23 1332   Dressing Changed New 10/23/23 1332   Patient Tolerance Tolerated well 10/23/23 1332   Dressing Status Remoistened 10/23/23 1332       Wound 10/20/23 Diabetic Ulcer Ankle Anterior;Right (Active)   Wound Image   10/23/23 1333   Wound Description Rg;Slough 10/23/23 1333   Kate-wound Assessment Erythema;Edema 10/23/23 1333   Wound Length (cm) 0.4 cm 10/23/23 1333   Wound Width (cm) 0.4 cm 10/23/23 1333   Wound Depth (cm) 0.1 cm 10/23/23 1333   Wound Surface Area (cm^2) 0.16 cm^2 10/23/23 1333   Wound Volume (cm^3) 0.016 cm^3 10/23/23 1333   Calculated Wound Volume (cm^3) 0.02 cm^3 10/23/23 1333   Drainage Amount Scant 10/23/23 1333   Drainage Description Tan 10/23/23 1333   Non-staged Wound Description Partial thickness 10/23/23 1333   Treatments Cleansed;Site care;Elevated 10/23/23 1333   Dressing Foam, Silicon (eg. Allevyn, etc); Calcium Alginate with Silver;Non adherent 10/23/23 1333   Dressing Changed New 10/23/23 1333   Patient Tolerance Tolerated well 10/23/23 1333   Dressing Status Intact;Dry;Clean 10/23/23 1333       Wound 10/20/23 Pressure Injury Sacrum Left;Lateral (Active)   Wound Image   10/23/23 1347   Wound Description Beefy red;Brown;Dry 10/23/23 1347   Pressure Injury Stage 2 10/23/23 1347   Kate-wound Assessment Dry; Intact; Brown;Callus 10/23/23 1347   Wound Length (cm) 4 cm 10/23/23 1347   Wound Width (cm) 3 cm 10/23/23 1347   Wound Depth (cm) 0.1 cm 10/23/23 1347   Wound Surface Area (cm^2) 12 cm^2 10/23/23 1347   Wound Volume (cm^3) 1.2 cm^3 10/23/23 1347   Calculated Wound Volume (cm^3) 1.2 cm^3 10/23/23 1347   Drainage Amount None 10/23/23 1347   Non-staged Wound Description Partial thickness 10/23/23 1347   Treatments Cleansed;Site care 10/23/23 1347   Dressing Foam, Silicon (eg. Allevyn, etc) 10/23/23 1347   Dressing Changed New 10/23/23 1347   Patient Tolerance Tolerated well 10/23/23 1347   Dressing Status Clean;Dry; Intact 10/23/23 1347       Wound 10/20/23 Diabetic Ulcer Ankle Left;Posterior (Active)   Wound Image   10/23/23 1335   Wound Description Slough; Tan 10/23/23 1335   Kaet-wound Assessment Erythema;Edema 10/23/23 1335   Wound Length (cm) 2 cm 10/23/23 1335   Wound Width (cm) 1.5 cm 10/23/23 1335   Wound Surface Area (cm^2) 3 cm^2 10/23/23 1335   Drainage Amount Small 10/23/23 1335   Drainage Description Clear; Tan 10/23/23 1335   Treatments Cleansed;Site care;Elevated 10/23/23 1335   Dressing Foam, Silicon (eg. Allevyn, etc); Calcium Alginate with Silver;Non adherent 10/23/23 1335   Dressing Changed Changed 10/23/23 1335   Patient Tolerance Tolerated well 10/23/23 1335   Dressing Status Clean;Dry; Intact 10/23/23 1335     Call or tigertext with any questions  Wound Care will continue to follow  Mari NAGEL RN [employer]

## 2023-10-24 LAB
ANION GAP SERPL CALCULATED.3IONS-SCNC: 10 MMOL/L
ANION GAP SERPL CALCULATED.3IONS-SCNC: 8 MMOL/L
APTT PPP: 42 SECONDS (ref 23–37)
APTT PPP: 61 SECONDS (ref 23–37)
BUN SERPL-MCNC: 34 MG/DL (ref 5–25)
BUN SERPL-MCNC: 36 MG/DL (ref 5–25)
CALCIUM SERPL-MCNC: 8.5 MG/DL (ref 8.4–10.2)
CALCIUM SERPL-MCNC: 8.6 MG/DL (ref 8.4–10.2)
CHLORIDE SERPL-SCNC: 100 MMOL/L (ref 96–108)
CHLORIDE SERPL-SCNC: 98 MMOL/L (ref 96–108)
CO2 SERPL-SCNC: 32 MMOL/L (ref 21–32)
CO2 SERPL-SCNC: 35 MMOL/L (ref 21–32)
CREAT SERPL-MCNC: 1.58 MG/DL (ref 0.6–1.3)
CREAT SERPL-MCNC: 1.6 MG/DL (ref 0.6–1.3)
ERYTHROCYTE [DISTWIDTH] IN BLOOD BY AUTOMATED COUNT: 16.1 % (ref 11.6–15.1)
GFR SERPL CREATININE-BSD FRML MDRD: 38 ML/MIN/1.73SQ M
GFR SERPL CREATININE-BSD FRML MDRD: 39 ML/MIN/1.73SQ M
GLUCOSE SERPL-MCNC: 112 MG/DL (ref 65–140)
GLUCOSE SERPL-MCNC: 146 MG/DL (ref 65–140)
GLUCOSE SERPL-MCNC: 164 MG/DL (ref 65–140)
GLUCOSE SERPL-MCNC: 172 MG/DL (ref 65–140)
GLUCOSE SERPL-MCNC: 205 MG/DL (ref 65–140)
GLUCOSE SERPL-MCNC: 95 MG/DL (ref 65–140)
HCT VFR BLD AUTO: 43.4 % (ref 36.5–49.3)
HGB BLD-MCNC: 14.1 G/DL (ref 12–17)
MAGNESIUM SERPL-MCNC: 1.7 MG/DL (ref 1.9–2.7)
MCH RBC QN AUTO: 30.7 PG (ref 26.8–34.3)
MCHC RBC AUTO-ENTMCNC: 32.5 G/DL (ref 31.4–37.4)
MCV RBC AUTO: 94 FL (ref 82–98)
PLATELET # BLD AUTO: 113 THOUSANDS/UL (ref 149–390)
PMV BLD AUTO: 12.4 FL (ref 8.9–12.7)
POTASSIUM SERPL-SCNC: 3.2 MMOL/L (ref 3.5–5.3)
POTASSIUM SERPL-SCNC: 3.5 MMOL/L (ref 3.5–5.3)
RBC # BLD AUTO: 4.6 MILLION/UL (ref 3.88–5.62)
SODIUM SERPL-SCNC: 141 MMOL/L (ref 135–147)
SODIUM SERPL-SCNC: 142 MMOL/L (ref 135–147)
WBC # BLD AUTO: 6.28 THOUSAND/UL (ref 4.31–10.16)

## 2023-10-24 PROCEDURE — 82948 REAGENT STRIP/BLOOD GLUCOSE: CPT

## 2023-10-24 PROCEDURE — 85730 THROMBOPLASTIN TIME PARTIAL: CPT

## 2023-10-24 PROCEDURE — 80048 BASIC METABOLIC PNL TOTAL CA: CPT | Performed by: NURSE PRACTITIONER

## 2023-10-24 PROCEDURE — 83735 ASSAY OF MAGNESIUM: CPT

## 2023-10-24 PROCEDURE — 99232 SBSQ HOSP IP/OBS MODERATE 35: CPT

## 2023-10-24 PROCEDURE — 85027 COMPLETE CBC AUTOMATED: CPT

## 2023-10-24 PROCEDURE — 85730 THROMBOPLASTIN TIME PARTIAL: CPT | Performed by: FAMILY MEDICINE

## 2023-10-24 RX ORDER — HYDROCORTISONE 25 MG/G
CREAM TOPICAL 2 TIMES DAILY
Status: DISCONTINUED | OUTPATIENT
Start: 2023-10-24 | End: 2023-10-27 | Stop reason: HOSPADM

## 2023-10-24 RX ORDER — MAGNESIUM SULFATE HEPTAHYDRATE 40 MG/ML
2 INJECTION, SOLUTION INTRAVENOUS ONCE
Status: COMPLETED | OUTPATIENT
Start: 2023-10-24 | End: 2023-10-24

## 2023-10-24 RX ORDER — POTASSIUM CHLORIDE 20 MEQ/1
40 TABLET, EXTENDED RELEASE ORAL ONCE
Status: COMPLETED | OUTPATIENT
Start: 2023-10-24 | End: 2023-10-24

## 2023-10-24 RX ADMIN — METOPROLOL SUCCINATE 25 MG: 25 TABLET, EXTENDED RELEASE ORAL at 08:38

## 2023-10-24 RX ADMIN — MAGNESIUM SULFATE HEPTAHYDRATE 2 G: 40 INJECTION, SOLUTION INTRAVENOUS at 08:37

## 2023-10-24 RX ADMIN — POTASSIUM CHLORIDE 20 MEQ: 1500 TABLET, EXTENDED RELEASE ORAL at 08:38

## 2023-10-24 RX ADMIN — APIXABAN 2.5 MG: 2.5 TABLET, FILM COATED ORAL at 17:29

## 2023-10-24 RX ADMIN — INSULIN LISPRO 1 UNITS: 100 INJECTION, SOLUTION INTRAVENOUS; SUBCUTANEOUS at 11:14

## 2023-10-24 RX ADMIN — POTASSIUM CHLORIDE 40 MEQ: 1500 TABLET, EXTENDED RELEASE ORAL at 11:08

## 2023-10-24 RX ADMIN — POTASSIUM CHLORIDE 20 MEQ: 1500 TABLET, EXTENDED RELEASE ORAL at 17:29

## 2023-10-24 RX ADMIN — HEPARIN SODIUM 4000 UNITS: 1000 INJECTION INTRAVENOUS; SUBCUTANEOUS at 06:53

## 2023-10-24 RX ADMIN — INSULIN LISPRO 1 UNITS: 100 INJECTION, SOLUTION INTRAVENOUS; SUBCUTANEOUS at 22:01

## 2023-10-24 RX ADMIN — METOPROLOL SUCCINATE 25 MG: 25 TABLET, EXTENDED RELEASE ORAL at 21:59

## 2023-10-24 RX ADMIN — APIXABAN 2.5 MG: 2.5 TABLET, FILM COATED ORAL at 11:08

## 2023-10-24 NOTE — ASSESSMENT & PLAN NOTE
Lab Results   Component Value Date    EGFR 38 10/24/2023    EGFR 39 10/23/2023    EGFR 36 10/22/2023    CREATININE 1.60 (H) 10/24/2023    CREATININE 1.57 (H) 10/23/2023    CREATININE 1.68 (H) 10/22/2023   Likely secondary to diabetic nephropathy. Baseline creatinine 1.6. Creatinine 1.88 on the day of admission. Continue to monitor renal function closely on current diuretic regimen. Patient with urinary retention. Status post Melchor catheter placed  Creatinine today is 1.77. Monitor creatinine with diuresis. Monitor BID BMP with lasix gtt.

## 2023-10-24 NOTE — PROGRESS NOTES
[address] [facility]  Progress Note  Name: Lizbet Mulligan  MRN: [id number]  Unit/Bed#: -30 I Date of Admission: 10/20/2023   Date of Service: 10/24/2023 I Hospital Day: 4    Assessment/Plan   * Acute combined systolic and diastolic congestive heart failure (HCC)  Assessment & Plan  Wt Readings from Last 3 Encounters:   10/24/23 94.2 kg (207 lb 10.8 oz)     Patient presents with worsening shortness of breath abdominal distention and lower extremity edema. Has no known prior history of congestive heart failure however has however has not seen a physician in 3 years and had stopped taking all his medications since February 2023  CT scan of the chest shows bilateral large pleural effusion, cardiomegaly, large ascites  Wife at bedside reports increased weight gain  Patient hypoxic requiring 3 L of supplemental oxygen via nasal cannula - has been weaned to room air  Will initiate treatment with IV furosemide 40 mg twice daily. -Patient is net -3 L today morning   Monitor daily weight and intake and output. Continue with current diuresis  Echo: Ejection fraction is 25 -29% with diffuse global hypokinesis. Noted to have moderate to severe TR and severe MR. Patient's wife reported that she talk to cardiology yesterday and cardiology gave him a very poor prognosis. -Patient is not a surgical candidate. Wife is considering hospice-we will consult hospice through case management  Cardiology: start lasix gtt 10 mg/hr, depending on results could consider diuril 1000 mg IVP daily, monitor BMP with K and mg repletion, increase lopressor if BP can tolerate and for HR management  Unfortunately, lasix gtt was not started, started on 10/22 - assess response. Cardiology recommending to continue lasix gtt. Replete potassium. Right foot ulcer ([address] [facility])  Assessment & Plan  Has chronic ulcer right foot. Has not seen a podiatrist or undergone any imaging.   Follow-up on lower extremity arterial Doppler study as patient does have bilateral lower extremity edema and diminished pulses. No DVT on Doppler  XR right foot: Erosive changes of the head of the second proximal phalanx and base of the fifth middle phalanx appearing in the area of patient's wound consistent with septic arthropathy and osteomyelitis. Arterial duplex and MRI initially ordered, d/c as patient's wife, daughter, and son would not want any treatment of infection of the foot with IV antibiotics or amputation. Podiatry consult discontinued  Discussed with patient's spouse, son, and daughter - they would NOT want patient on antibiotics and would not want any amputation. They are planning on doing hospice on discharge. Family does not want evaluation by podiatry at this time as it will not change their decision in being interested in comfort for patient. Only wanting to continue with lasix gtt at this time. Discussed that if patient does have infection of the bone, it is important to start IV antibiotics to prevent sepsis, septic shock, loss of limb, death - patient's wife, daughter, and son verbalized understanding of not starting antibiotics and all agreeable to not start at this time. Goals of care, counseling/discussion  Assessment & Plan  Patient's spouse Angelique Burger, daughter, and son present at bedside and discussing goals of care with patient. She states that he would not want to be living like this and would like patient to go home on hospice when as stable as possible. Would like to try and get the fluid off him and then have him come back home. Does not want any extensive measures taking or any procedures. Does not want IV antibiotics or treatment of osteomyelitis.  Discussed that patient can progress to sepsis, septic shock, have limb loss, and death sooner due to not treating active infection, patient's wife verbalized understanding stating "he has had this toe wound for so long and was likely infected the whole time, but we don't want any antibiotics". She does not want podiatry consult either. She would like patient to  be comfortable, biggest concern is getting more of the fluid off him so he can be comfortable for discharge, but would not want anything further after discharge from the hospital. [facility] with heparin at this time to prevent stroke while inpatient. Discussed with cardiology at bedside regarding patient's anticoagulation status - would like to be discharged with anticoagulation at this time. Would not want patient having a stroke. Eliquis 2.5 mg bid sent to pharmacy for price check. Will consult CM for hospice assistance for when patient stable for discharge. POLST complete and in patient's chart  See acp note    Atrial fibrillation with RVR ([address])  Assessment & Plan  Present admission with heart rate in the 120s. Received 1 dose of IV Cardizem in the emergency room. Subsequently heart rate varying between 100-1 10. Continue with telemetry monitoring  Initiate Lopressor 25mg twice daily  [id number] score 4  Discussed with wife about anticoagulation-plan was for anticoagulation. CT head is concerning for tiny subdural hemorrhage. Anticoagulation was on hold. But on repeat imaging it remained stable. Case was discussed with neurosurgery physician assistant. Cleared for anticoagulation if medically necessary. Discussed with the wife. Wife reported that she would like to hold off on anticoagulation. She reported that she talked to cardiology and cardiology gave her very poor prognosis for him and he is not a surgical candidate. He understand his prognosis and would like to hold anticoagulation for now. Risk and benefit of anticoagulation versus known anticoagulation was explained to the wife- wife, daughter, and son are ok with heparin while inpatient, initially were not interested in [facility] after discharge, but family thought more on this and would like to do eliquis on discharge for prevention of stroke.  They understand the risk of bleeding associated and would like to proceed. Eliquis 2.5mg bid sent to pharmacy for price check - $10, patient's wife agreeable to this cost  Heparin gtt d/c and eliquis 2.5 mg bid started. Acute metabolic encephalopathy  Assessment & Plan  Wife reports worsening confusion over the last several weeks  No asterixis noted. Neuro exam nonfocal  Likely multifactorial in the setting of hypoxia related to heart failure. However in view of new onset atrial fibrillation cannot rule out acute CVA or hyperammonemia in the setting of cirrhosis seen on imaging. Continue with serial neurochecks  CT head reviewed. Questionable small tiny subdural versus dural fold on the CT. Discussed with neurosurgery. Stable for anticoagulation since the repeat CAT scan is stable. Alcoholic cirrhosis of liver with ascites [facility])  Assessment & Plan  Seen on CT scan of the abdomen. Associated with large ascites. Will need paracentesis. 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 Follow-up on abdominal ultrasound: Abnormal liver compatible with hepatic cirrhosis. No evidence of liver mass. Moderate ascites. Bilateral renal atrophy. No hydronephrosis or perinephric collection. Cholelithiasis without evidence of acute cholecystitis. Wife reported some confusion. Follow-up on ammonia-ammonia and chronic hepatitis panel is negative    Thrombocytopenia ([address])  Assessment & Plan  Noted to have thrombocytopenia on labs. No evidence of active or acute bleeding. Starting on heparin gtt due to a fib history. Patient's wife agreeable to [facility] at this time. Will monitor     Abnormal head CT  Assessment & Plan  Patient had a CAT scan done as part of his work-up for encephalopathy. Noted to have High dense focus along the right falx -dural calcification or tiny subdural hemorrhage . Repeat CAT scan showed stability. Discussed with neurosurgery.   Denies any trauma   Patient's family agreeable to anticoagulation at this time with heparin    Type 2 diabetes mellitus with renal complication [facility])  Assessment & Plan  Lab Results   Component Value Date    HGBA1C 7.5 (H) 10/20/2023       Recent Labs     10/23/23  1602 10/23/23  2103 10/24/23  0753 10/24/23  1114   POCGLU 147* 273* 112 172*         Blood Sugar Average: Last 72 hrs:  (P) [id number]  Patient has history of type 2 diabetes with underlying chronic kidney disease likely secondary to diabetic nephropathy. However he has been off all his medications since February 2023. Follow-up on hemoglobin A1c and start sliding scale insulin. May need titration and adjustment of insulin regimen based on blood glucose control. Bladder tumor  Assessment & Plan  Bladder tumor s/pTURBT in 2020. Has not subsequently followed up with urologist.  Found to have urinary retention. Brian catheter placed. Urinalysis with microscopy negative for hematuria. Family would like to rediscuss brian catheter closer to discharge if they would like him leaving with this or removal prior to discharge. Stage 3 chronic kidney disease [facility])  Assessment & Plan  Lab Results   Component Value Date    EGFR 38 10/24/2023    EGFR 39 10/23/2023    EGFR 36 10/22/2023    CREATININE 1.60 (H) 10/24/2023    CREATININE 1.57 (H) 10/23/2023    CREATININE 1.68 (H) 10/22/2023   Likely secondary to diabetic nephropathy. Baseline creatinine 1.6. Creatinine 1.88 on the day of admission. Continue to monitor renal function closely on current diuretic regimen. Patient with urinary retention. Status post Brian catheter placed  Creatinine today is 1.77. Monitor creatinine with diuresis. Monitor BID BMP with lasix gtt. VTE Pharmacologic Prophylaxis: VTE Score: 5 High Risk (Score >/= 5) - Pharmacological DVT Prophylaxis Ordered: apixaban (Eliquis). Sequential Compression Devices Ordered.     Patient Centered Rounds: I performed bedside rounds with nursing staff today.  Discussions with Specialists or Other Care Team Provider: nursing, case management, cardiology    Education and Discussions with Family / Patient: Updated  (wife) at bedside. Total Time Spent on Date of Encounter in care of patient: 40 mins. This time was spent on one or more of the following: performing physical exam; counseling and coordination of care; obtaining or reviewing history; documenting in the medical record; reviewing/ordering tests, medications or procedures; communicating with other healthcare professionals and discussing with patient's family/caregivers. Current Length of Stay: 4 day(s)  Current Patient Status: Inpatient   Certification Statement: The patient will continue to require additional inpatient hospital stay due to acute CHF exacerbation  Discharge Plan: Anticipate discharge in 48-72 hrs to home with hospice    Code Status: Level 3 - DNAR and DNI    Subjective:   Patient seen and examined this morning. He said he is doing well at this time. He said that he is breathing much better and feeling much better. He said "I was a real bozo for not taking my medications for years and look where it got me". Offers some complaints of rectal pain at this time. Objective:     Vitals:   Temp (24hrs), Av.5 °F (36.4 °C), Min:97.2 °F (36.2 °C), Max:97.7 °F (36.5 °C)    Temp:  [97.2 °F (36.2 °C)-97.7 °F (36.5 °C)] 97.3 °F (36.3 °C)  HR:  [] 107  Resp:  [18] 18  BP: (119-123)/(76-82) 121/77  SpO2:  [92 %-96 %] 93 %  Body mass index is 25.96 kg/m². Input and Output Summary (last 24 hours): Intake/Output Summary (Last 24 hours) at 10/24/2023 1413  Last data filed at 10/24/2023 1357  Gross per 24 hour   Intake 330 ml   Output 5850 ml   Net -5520 ml       Physical Exam:   Physical Exam  Vitals reviewed. Constitutional:       General: He is not in acute distress. Appearance: He is ill-appearing. He is not toxic-appearing.    HENT:      Mouth/Throat: Mouth: Mucous membranes are moist.   Eyes:      Pupils: Pupils are equal, round, and reactive to light. Cardiovascular:      Rate and Rhythm: Normal rate. Rhythm irregular. Heart sounds: Murmur heard. Pulmonary:      Effort: No respiratory distress. Breath sounds: No stridor. Rales present. No wheezing or rhonchi. Abdominal:      General: Bowel sounds are normal. There is distension. Palpations: Abdomen is soft. There is no mass. Tenderness: There is no abdominal tenderness. Genitourinary:     Comments: Melchor in place with clear yellow urine output  Musculoskeletal:      Right lower leg: Edema present. Left lower leg: Edema present. Skin:     General: Skin is warm and dry. Findings: Erythema (left anticubital area with erythema) and lesion (right 2nd toe wound) present. Neurological:      Mental Status: He is alert and oriented to person, place, and time. Psychiatric:         Mood and Affect: Mood normal.         Behavior: Behavior normal.          Additional Data:     Labs:  Results from last 7 days   Lab Units 10/24/23  0507 10/22/23  0518 10/21/23  0508 10/20/23  1326 10/20/23  0923   WBC Thousand/uL 6.28   < > 9.07   < > 9.02   HEMOGLOBIN g/dL 14.1   < > 14.6   < > 17.1*   HEMATOCRIT % 43.4   < > 46.6   < > 55.7*   PLATELETS Thousands/uL 113*   < > 91*   < > 141*   NEUTROS PCT %  --   --   --   --  77*   LYMPHS PCT %  --   --   --   --  13*   LYMPHO PCT %  --   --  10*  --   --    MONOS PCT %  --   --   --   --  7   MONO PCT %  --   --  7  --   --    EOS PCT %  --   --  1  --  2    < > = values in this interval not displayed.      Results from last 7 days   Lab Units 10/24/23  0507 10/22/23  2113 10/22/23  0518   SODIUM mmol/L 142   < > 140   POTASSIUM mmol/L 3.2*   < > 3.7   CHLORIDE mmol/L 100   < > 105   CO2 mmol/L 32   < > 27   BUN mg/dL 36*   < > 41*   CREATININE mg/dL 1.60*   < > 1.71*   ANION GAP mmol/L 10   < > 8   CALCIUM mg/dL 8.5   < > 8.3*   ALBUMIN g/dL  -- --  2.9*   TOTAL BILIRUBIN mg/dL  --   --  1.01*   ALK PHOS U/L  --   --  78   ALT U/L  --   --  10   AST U/L  --   --  20   GLUCOSE RANDOM mg/dL 95   < > 106    < > = values in this interval not displayed. Results from last 7 days   Lab Units 10/22/23  1512   INR  1.28*     Results from last 7 days   Lab Units 10/24/23  1114 10/24/23  0753 10/23/23  2103 10/23/23  1602 10/23/23  1053 10/23/23  0709 10/22/23  2102 10/22/23  1620 10/22/23  1117 10/22/23  0728 10/21/23  2054 10/21/23  1647   POC GLUCOSE mg/dl 172* 112 273* 147* 201* 140 146* 206* 177* 117 119 155*     Results from last 7 days   Lab Units 10/20/23  1342   HEMOGLOBIN A1C % 7.5*     Results from last 7 days   Lab Units 10/21/23  0508 10/20/23  1158 10/20/23  0923   LACTIC ACID mmol/L  --  2.2* 4.0*   PROCALCITONIN ng/ml 0.32*  --  0.21       Lines/Drains:  Invasive Devices       Peripheral Intravenous Line  Duration             Peripheral IV 10/23/23 Dorsal (posterior); Right Forearm <1 day              Drain  Duration             Urethral Catheter 18 Fr. 4 days                  Urinary Catheter:  Goal for removal:  will possibly discharge with brian or remove per family request on discharge           Telemetry:  Telemetry Orders (From admission, onward)               24 Hour Telemetry Monitoring  Continuous x 24 Hours (Telem)        Question:  Reason for 24 Hour Telemetry  Answer:  Arrhythmias requiring acute medical intervention / PPM or ICD malfunction                     Telemetry Reviewed: Atrial fibrillation. HR averaging 70-80  Indication for Continued Telemetry Use: Acute CHF on >200 mg lasix/day or equivalent dose or with new reduced EF. Imaging: No pertinent imaging reviewed. Recent Cultures (last 7 days):   Results from last 7 days   Lab Units 10/20/23  0930 10/20/23  0923   BLOOD CULTURE  No Growth at 72 hrs. No Growth at 72 hrs.        Last 24 Hours Medication List:   Current Facility-Administered Medications   Medication Dose Route Frequency Provider Last Rate    acetaminophen  650 mg Oral Q6H PRN Marney Boast Mansoor, MD      apixaban  2.5 mg Oral BID Johanna Navarrete PA-C      furosemide  10 mg/hr Intravenous Continuous Navjot Aragon PA-C 10 mg/hr (10/23/23 0840)    insulin lispro  1-5 Units Subcutaneous HS Monie Britton MD      insulin lispro  1-6 Units Subcutaneous TID AC Monie Britton MD      lidocaine  1 patch Topical Daily Irina Adalid Aragon PA-C      metoprolol  2.5 mg Intravenous Q6H PRN Monie Britton MD      metoprolol succinate  25 mg Oral BID KRISH Pereira      oxyCODONE  2.5 mg Oral Q6H PRN Johanna Navarrete PA-C      oxyCODONE  5 mg Oral Q6H PRN Navjot Aragon PA-C      potassium chloride  20 mEq Oral BID KRISH Pereira          Today, Patient Was Seen By: Johanna Navarrete PA-C    **Please Note: This note may have been constructed using a voice recognition system. **

## 2023-10-24 NOTE — PROGRESS NOTES
Progress Note:Cardiology  Shonda Heads 1940, 78 y.o. male MRN: [id number]    Unit/Bed#: -[facility] Encounter: [id number]  Attending Physician: Ginette Coelho MD   Primary Care Provider: No primary care provider on file. Date admitted to hospital: 10/20/2023  Length of stay: 4       Assessment/Plan:     Acute hypoxic respiratory failure:              Resolved with diuresis     2. Acute on chronic HFrEF:  Echocardiogram 10/20/2023 shows LVEF 25-29%, RV dilated with reduced function, bi-atrial dilation, severe MR, mod-severe TR with PASP 65mmHg. Started on furosemide drip at 10mg/hr on 10/22/23 with excellent response. -3.8 L in 24 hours. Will continue Lasix drip today. Repeat BMP at 1400 today. Strict I's/O's, standing daily weights as safe. Sodium and fluid restriction. Optimize electrolytes for K+ >4, Mag >2. See discussion under cardiomyopathy. 3. Dilated cardiomyopathy:  Echocardiogram 10/20/2023 with EF 25-29%, RV dilation with reduced function, severe MR, mod-severe TR with PASP 65mmHg. Previous echocardiogram in 2020 showed EF 58%. 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 Prognosis is poor and he is seriously considering hospice services. If he opts against hospice he should have eventual outpatient ischemic work up. Given his history of medication non-adherence and co-morbidities he is not a candidate for advanced therapies at this time. Will titrate GDMT as able. Transitioned from metoprolol tartrate to metoprolol succinate 25 mg BID on the evening of 10/23/2023. Will add ARB, such as losartan as BP allows. Continue to diurese - see #2. 4. Severe mitral regurgitation:  Suspect functional. If he opts against hospice this should be re-evaluated when optimized on GDMT     5. Persistent atrial fibrillation with RVR:  History of permanent atrial fibrillation, previously on warfarin, currently on a heparin drip. HR's were uncontrolled at admission.  Heart rates have improved with addition of metoprolol tartrate 25 mg BID. HR's remain acceptable with transition to metoprolol succinate 25 mg BID. Can titrate dose as needed. I discussed use of full anticoagulation for stroke prevention with patient and wife today. They are agreeable. Please start Eliquis 2.5 mg BID. Continue telemetry for another 24 hours. Optimize electrolytes for K+ >4, Mag >2.     6. CAD with PCI to LAD, Lcx, RCA in 2005 at [facility], history of NSTEMI in 2011 with PCI and BMS to OM:  Will defer aspirin in lieu of full AC for stroke prevention in a fib. Not on statin with history of liver cirrhosis. Continue beta blocker - now  on metoprolol succinate 25 mg BID. 7. Remote ETOH abuse, in remission. 8. Bilateral pleural effusions:   See discussion under #1 and #2. No indication for thoracentesis at this time. 9. Liver cirrhosis     10. Recent non-adherence to medical therapy      Subjective:   Patient seen and examined. No significant events overnight. He states his breathing is significantly improved. Cough is nearly gone. No chest pain. Occasional dyspnea with exertion. No lightheadedness. No palpitations. Review of Systems   Constitutional: Negative. HENT: Negative. Cardiovascular:  Positive for dyspnea on exertion and leg swelling. Negative for chest pain, irregular heartbeat, near-syncope, orthopnea and palpitations. Respiratory:  Negative for cough and snoring. Endocrine: Negative. Skin: Negative. Musculoskeletal: Negative. Gastrointestinal: Negative. Genitourinary: Negative. Neurological: Negative. Psychiatric/Behavioral: Negative. Objective:     Vitals: Blood pressure 121/77, pulse (!) 107, temperature (!) 97.3 °F (36.3 °C), resp. rate 18, height 6' 3" (1.905 m), weight 94.2 kg (207 lb 10.8 oz), SpO2 93 %. , Body mass index is 25.96 kg/m².,     Orthostatic Blood Pressures      Flowsheet Row Most Recent Value   Blood Pressure 121/77 filed at 10/24/2023 0756   Patient Position - Orthostatic VS Lying filed at 10/23/2023 5790            Physical Exam  Vitals and nursing note reviewed. Constitutional:       General: He is not in acute distress. Appearance: He is well-developed. Comments: Appears frail, chronically ill   HENT:      Head: Normocephalic and atraumatic. Eyes:      Conjunctiva/sclera: Conjunctivae normal.   Neck:      Vascular: JVD present. Cardiovascular:      Rate and Rhythm: Normal rate. Rhythm irregular. Heart sounds: No murmur heard. Pulmonary:      Effort: Pulmonary effort is normal. No respiratory distress. Breath sounds: Examination of the left-lower field reveals rales. Rales present. Comments: Breathing comfortably on room air  Abdominal:      Palpations: Abdomen is soft. Tenderness: There is no abdominal tenderness. Musculoskeletal:         General: No swelling. Cervical back: Neck supple. Right lower leg: 3+ Edema present. Left lower leg: 3+ Edema present. Skin:     General: Skin is warm and dry. Capillary Refill: Capillary refill takes less than 2 seconds. Neurological:      Mental Status: He is alert. Psychiatric:         Mood and Affect: Mood normal.         Speech: Speech normal.         Behavior: Behavior normal. Behavior is cooperative.          Cognition and Memory: Cognition normal.            Intake/Output Summary (Last 24 hours) at 10/24/2023 0856  Last data filed at 10/24/2023 0821  Gross per 24 hour   Intake 570 ml   Output 4300 ml   Net -3730 ml       Weight (last 2 days)       Date/Time Weight    10/24/23 0600 94.2 (207.67)    10/24/23 0500 94.2 (207.67)    10/23/23 0600 101 (222.66)    10/22/23 0600 101 (223.77)               Medications:      Current Facility-Administered Medications:     acetaminophen (TYLENOL) tablet 650 mg, 650 mg, Oral, Q6H PRN, Monie Britton MD    furosemide (LASIX) 500 mg infusion 50 mL, 10 mg/hr, Intravenous, Continuous, Venita Hamilton PA-C, Last Rate: 1 mL/hr at 10/23/23 0840, 10 mg/hr at 10/23/23 0840    heparin (porcine) 25,000 units in 0.45% NaCl 250 mL infusion (premix), 3-20 Units/kg/hr (Order-Specific), Intravenous, Titrated, Venita Hamilton PA-C, Last Rate: 9.1 mL/hr at 10/24/23 0655, 10.1 Units/kg/hr at 10/24/23 0655    heparin (porcine) injection 2,000 Units, 2,000 Units, Intravenous, Q6H PRN, RICHIE WalkerC, 2,000 Units at 10/23/23 1916    heparin (porcine) injection 4,000 Units, 4,000 Units, Intravenous, Q6H PRN, Venita Hamilton PA-C, 4,000 Units at 10/24/23 0653    insulin lispro (HumaLOG) 100 units/mL subcutaneous injection 1-5 Units, 1-5 Units, Subcutaneous, HS, Monie Britton MD, 3 Units at 10/23/23 2224    insulin lispro (HumaLOG) 100 units/mL subcutaneous injection 1-6 Units, 1-6 Units, Subcutaneous, TID AC, 2 Units at 10/23/23 1130 **AND** Fingerstick Glucose (POCT), , , TID AC, Monie Britton MD    lidocaine (LIDODERM) 5 % patch 1 patch, 1 patch, Topical, Daily, Venita Hamilton PA-C, 1 patch at 10/23/23 0595    magnesium sulfate 2 g/50 mL IVPB (premix) 2 g, 2 g, Intravenous, Once, Venita Hamilton PA-C, Last Rate: 25 mL/hr at 10/24/23 0837, 2 g at 10/24/23 0837    metoprolol (LOPRESSOR) injection 2.5 mg, 2.5 mg, Intravenous, Q6H PRN, Mayra Méndez MD    metoprolol succinate (TOPROL-XL) 24 hr tablet 25 mg, 25 mg, Oral, BID, KRISH Meehan, 25 mg at 10/24/23 7641    oxyCODONE (ROXICODONE) IR tablet 2.5 mg, 2.5 mg, Oral, Q6H PRN, Dietra Jazmín Aragon, PA-C    oxyCODONE (ROXICODONE) IR tablet 5 mg, 5 mg, Oral, Q6H PRN, OLIVIA Avendaño-BIB, 5 mg at 10/22/23 1519    potassium chloride (K-DUR,KLOR-CON) CR tablet 20 mEq, 20 mEq, Oral, BID, Miri Helms, CRNP, 20 mEq at 10/24/23 0838     Labs & Results:        Results from last 7 days   Lab Units 10/24/23  0507 10/23/23  1156 10/22/23  0518   WBC Thousand/uL 6.28 6.98 6.48   HEMOGLOBIN g/dL 14.1 15.7 13.2   HEMATOCRIT % 43.4 50.0* 41.9   PLATELETS Thousands/uL 113* 116* 87*     Results from last 7 days   Lab Units 10/21/23  0508   TRIGLYCERIDES mg/dL 144   HDL mg/dL 33*     Results from last 7 days   Lab Units 10/24/23  0507 10/23/23  0343 10/22/23  2113 10/22/23  0518 10/21/23  0508 10/20/23  0923   POTASSIUM mmol/L 3.2* 3.5 3.8 3.7 4.5 5.2   CHLORIDE mmol/L 100 104 106 105 106 102   CO2 mmol/L 32 25 23 27 23 25   BUN mg/dL 36* 40* 41* 41* 44* 44*   CREATININE mg/dL 1.60* 1.57* 1.68* 1.71* 1.77* 1.88*   CALCIUM mg/dL 8.5 8.4 8.3* 8.3* 8.6 9.2   ALK PHOS U/L  --   --   --  78 82 105*   ALT U/L  --   --   --  10 11 15   AST U/L  --   --   --  20 21 26     Results from last 7 days   Lab Units 10/24/23  0507 10/24/23  0014 10/23/23  1759 10/22/23  2113 10/22/23  1512 10/21/23  0626 10/20/23  1342   INR   --   --   --   --  1.28* 1.30* 1.24*   PTT seconds 42* 61* 48*   < > 29  --  26    < > = values in this interval not displayed. Results from last 7 days   Lab Units 10/24/23  0507 10/23/23  0343 10/22/23  0518   MAGNESIUM mg/dL 1.7* 1.9 2.0     Results from last 7 days   Lab Units 10/20/23  0923   BNP pg/mL 3,855*      Telemetry: atrial fibrillation, rate 70-80's. Brief NSVT at 3-6 beats overnight. Counseling / Coordination of Care  Total floor / unit time spent today 25 minutes. Greater than 50% of total time was spent with the patient and / or family counseling and / or coordination of care.   A description of the counseling / coordination of care: Discussed case with Fidel Walden PA-C.

## 2023-10-24 NOTE — PLAN OF CARE
Problem: Potential for Falls  Goal: Patient will remain free of falls  Description: INTERVENTIONS:  - Educate patient/family on patient safety including physical limitations  - Instruct patient to call for assistance with activity   - Consult OT/PT to assist with strengthening/mobility   - Keep Call bell within reach  - Keep bed low and locked with side rails adjusted as appropriate  - Keep care items and personal belongings within reach  - Initiate and maintain comfort rounds  - Make Fall Risk Sign visible to staff  - Offer Toileting every 2 Hours, in advance of need  - Initiate/Maintain alarm  - Obtain necessary fall risk management equipment  - Apply yellow socks and bracelet for high fall risk patients  - Consider moving patient to room near nurses station  Outcome: Progressing     Problem: NEUROSENSORY - ADULT  Goal: Achieves stable or improved neurological status  Description: INTERVENTIONS  - Monitor and report changes in neurological status  - Monitor vital signs such as temperature, blood pressure, glucose, and any other labs ordered   - Initiate measures to prevent increased intracranial pressure  - Monitor for seizure activity and implement precautions if appropriate      Outcome: Progressing  Goal: Achieves maximal functionality and self care  Description: INTERVENTIONS  - Monitor swallowing and airway patency with patient fatigue and changes in neurological status  - Encourage and assist patient to increase activity and self care.    - Encourage visually impaired, hearing impaired and aphasic patients to use assistive/communication devices  Outcome: Progressing     Problem: CARDIOVASCULAR - ADULT  Goal: Maintains optimal cardiac output and hemodynamic stability  Description: INTERVENTIONS:  - Monitor I/O, vital signs and rhythm  - Monitor for S/S and trends of decreased cardiac output  - Administer and titrate ordered vasoactive medications to optimize hemodynamic stability  - Assess quality of pulses, skin color and temperature  - Assess for signs of decreased coronary artery perfusion  - Instruct patient to report change in severity of symptoms  Outcome: Progressing  Goal: Absence of cardiac dysrhythmias or at baseline rhythm  Description: INTERVENTIONS:  - Continuous cardiac monitoring, vital signs, obtain 12 lead EKG if ordered  - Administer antiarrhythmic and heart rate control medications as ordered  - Monitor electrolytes and administer replacement therapy as ordered  Outcome: Progressing     Problem: RESPIRATORY - ADULT  Goal: Achieves optimal ventilation and oxygenation  Description: INTERVENTIONS:  - Assess for changes in respiratory status  - Assess for changes in mentation and behavior  - Position to facilitate oxygenation and minimize respiratory effort  - Oxygen administered by appropriate delivery if ordered  - Initiate smoking cessation education as indicated  - Encourage broncho-pulmonary hygiene including cough, deep breathe, Incentive Spirometry  - Assess the need for suctioning and aspirate as needed  - Assess and instruct to report SOB or any respiratory difficulty  - Respiratory Therapy support as indicated  Outcome: Progressing     Problem: GENITOURINARY - ADULT  Goal: Maintains or returns to baseline urinary function  Description: INTERVENTIONS:  - Assess urinary function  - Encourage oral fluids to ensure adequate hydration if ordered  - Administer IV fluids as ordered to ensure adequate hydration  - Administer ordered medications as needed  - Offer frequent toileting  - Follow urinary retention protocol if ordered  Outcome: Progressing  Goal: Absence of urinary retention  Description: INTERVENTIONS:  - Assess patient’s ability to void and empty bladder  - Monitor I/O  - Bladder scan as needed  - Discuss with physician/AP medications to alleviate retention as needed  - Discuss catheterization for long term situations as appropriate  Outcome: Progressing  Goal: Urinary catheter remains patent  Description: INTERVENTIONS:  - Assess patency of urinary catheter  - If patient has a chronic brian, consider changing catheter if non-functioning  - Follow guidelines for intermittent irrigation of non-functioning urinary catheter  Outcome: Progressing     Problem: METABOLIC, FLUID AND ELECTROLYTES - ADULT  Goal: Electrolytes maintained within normal limits  Description: INTERVENTIONS:  - Monitor labs and assess patient for signs and symptoms of electrolyte imbalances  - Administer electrolyte replacement as ordered  - Monitor response to electrolyte replacements, including repeat lab results as appropriate  - Instruct patient on fluid and nutrition as appropriate  Outcome: Progressing  Goal: Fluid balance maintained  Description: INTERVENTIONS:  - Monitor labs   - Monitor I/O and WT  - Instruct patient on fluid and nutrition as appropriate  - Assess for signs & symptoms of volume excess or deficit  Outcome: Progressing  Goal: Glucose maintained within target range  Description: INTERVENTIONS:  - Monitor Blood Glucose as ordered  - Assess for signs and symptoms of hyperglycemia and hypoglycemia  - Administer ordered medications to maintain glucose within target range  - Assess nutritional intake and initiate nutrition service referral as needed  Outcome: Progressing       Problem: MUSCULOSKELETAL - ADULT  Goal: Maintain or return mobility to safest level of function  Description: INTERVENTIONS:  - Assess patient's ability to carry out ADLs; assess patient's baseline for ADL function and identify physical deficits which impact ability to perform ADLs (bathing, care of mouth/teeth, toileting, grooming, dressing, etc.)  - Assess/evaluate cause of self-care deficits   - Assess range of motion  - Assess patient's mobility  - Assess patient's need for assistive devices and provide as appropriate  - Encourage maximum independence but intervene and supervise when necessary  - Involve family in performance of ADLs  - Assess for home care needs following discharge   - Consider OT consult to assist with ADL evaluation and planning for discharge  - Provide patient education as appropriate  Outcome: Progressing  Goal: Maintain proper alignment of affected body part  Description: INTERVENTIONS:  - Support, maintain and protect limb and body alignment  - Provide patient/ family with appropriate education  Outcome: Progressing     Problem: MOBILITY - ADULT  Goal: Maintain or return to baseline ADL function  Description: INTERVENTIONS:  -  Assess patient's ability to carry out ADLs; assess patient's baseline for ADL function and identify physical deficits which impact ability to perform ADLs (bathing, care of mouth/teeth, toileting, grooming, dressing, etc.)  - Assess/evaluate cause of self-care deficits   - Assess range of motion  - Assess patient's mobility; develop plan if impaired  - Assess patient's need for assistive devices and provide as appropriate  - Encourage maximum independence but intervene and supervise when necessary  - Involve family in performance of ADLs  - Assess for home care needs following discharge   - Consider OT consult to assist with ADL evaluation and planning for discharge  - Provide patient education as appropriate  Outcome: Progressing  Goal: Maintains/Returns to pre admission functional level  Description: INTERVENTIONS:  - Perform BMAT or MOVE assessment daily.   - Set and communicate daily mobility goal to care team and patient/family/caregiver. - Collaborate with rehabilitation services on mobility goals if consulted  - Perform Range of Motion 3 times a day. - Reposition patient every 2 hours.   - Dangle patient 3 times a day  - Stand patient 3 times a day  - Ambulate patient 3 times a day  - Out of bed to chair 3 times a day   - Out of bed for meals 3 times a day  - Out of bed for toileting  - Record patient progress and toleration of activity level   Outcome: Progressing     Problem: Prexisting or High Potential for Compromised Skin Integrity  Goal: Skin integrity is maintained or improved  Description: INTERVENTIONS:  - Identify patients at risk for skin breakdown  - Assess and monitor skin integrity  - Assess and monitor nutrition and hydration status  - Monitor labs   - Assess for incontinence   - Turn and reposition patient  - Assist with mobility/ambulation  - Relieve pressure over bony prominences  - Avoid friction and shearing  - Provide appropriate hygiene as needed including keeping skin clean and dry  - Evaluate need for skin moisturizer/barrier cream  - Collaborate with interdisciplinary team   - Patient/family teaching  - Consider wound care consult   Outcome: Progressing     Problem: Nutrition/Hydration-ADULT  Goal: Nutrient/Hydration intake appropriate for improving, restoring or maintaining nutritional needs  Description: Monitor and assess patient's nutrition/hydration status for malnutrition. Collaborate with interdisciplinary team and initiate plan and interventions as ordered. Monitor patient's weight and dietary intake as ordered or per policy. Utilize nutrition screening tool and intervene as necessary. Determine patient's food preferences and provide high-protein, high-caloric foods as appropriate.      INTERVENTIONS:  - Monitor oral intake, urinary output, labs, and treatment plans  - Assess nutrition and hydration status and recommend course of action  - Evaluate amount of meals eaten  - Assist patient with eating if necessary   - Allow adequate time for meals  - Recommend/ encourage appropriate diets, oral nutritional supplements, and vitamin/mineral supplements  - Order, calculate, and assess calorie counts as needed  - Recommend, monitor, and adjust tube feedings and TPN/PPN based on assessed needs  - Assess need for intravenous fluids  - Provide specific nutrition/hydration education as appropriate  - Include patient/family/caregiver in decisions related to nutrition  Outcome: Progressing

## 2023-10-24 NOTE — ASSESSMENT & PLAN NOTE
Wt Readings from Last 3 Encounters:   10/24/23 94.2 kg (207 lb 10.8 oz)     Patient presents with worsening shortness of breath abdominal distention and lower extremity edema. Has no known prior history of congestive heart failure however has however has not seen a physician in 3 years and had stopped taking all his medications since February 2023  CT scan of the chest shows bilateral large pleural effusion, cardiomegaly, large ascites  Wife at bedside reports increased weight gain  Patient hypoxic requiring 3 L of supplemental oxygen via nasal cannula - has been weaned to room air  Will initiate treatment with IV furosemide 40 mg twice daily. -Patient is net -3 L today morning   Monitor daily weight and intake and output. Continue with current diuresis  Echo: Ejection fraction is 25 -29% with diffuse global hypokinesis. Noted to have moderate to severe TR and severe MR. Patient's wife reported that she talk to cardiology yesterday and cardiology gave him a very poor prognosis. -Patient is not a surgical candidate. Wife is considering hospice-we will consult hospice through case management  Cardiology: start lasix gtt 10 mg/hr, depending on results could consider diuril 1000 mg IVP daily, monitor BMP with K and mg repletion, increase lopressor if BP can tolerate and for HR management  Unfortunately, lasix gtt was not started, started on 10/22 - assess response. Cardiology recommending to continue lasix gtt. Replete potassium.

## 2023-10-24 NOTE — ASSESSMENT & PLAN NOTE
Noted to have thrombocytopenia on labs. No evidence of active or acute bleeding. Starting on heparin gtt due to a fib history. Patient's wife agreeable to [facility] at this time.   Will monitor

## 2023-10-24 NOTE — PROGRESS NOTES
Pastoral Care Progress Note    10/24/2023  Patient: Umu Walker : 1940  Admission Date & Time: 10/20/2023 3625  MRN: [id number] CSN: [id number]    [facility] in consult with aide, initiated support visit to pt in setting of choice of hospice. Pt received Ch upright in bed visiting with a long time friend. 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 Pt shared that he is frustrated by what he perceives as a loss of language, making it hard to bring thoughts he has to verbal expression. Pt shared that he is content to move to hospice care. Pt and friend asked for a blessing from the [facility], [facility] provided. [facility] encouraged pt and family to request her if she can be of service.   [facility] provided empathetic listening and support, ritual.               Chaplaincy Interventions Utilized:   Empowerment: Facilitated group experience    Exploration: Explored relational needs & resources    Collaboration: Consulted with interdisciplinary team    Relationship Building: Cultivated a relationship of care and support and Listened empathically    Ritual: Provided ritual    Chaplaincy Outcomes Achieved:  Expressed peace    Spiritual Coping Strategies Utilized:   Connectedness     10/24/23 1000   Clinical Encounter Type   Visited With Patient and family together  (friend bedside)   Routine Visit Introduction   Referral From Nurse

## 2023-10-24 NOTE — ASSESSMENT & PLAN NOTE
Acute Care - Speech Language Pathology   Swallow Initial Evaluation [facility]     Patient Name: Jess Ramsey  : 1951  MRN: [id number]  Today's Date: 2023               Admit Date: 2023  SPEECH-LANGUAGE PATHOLOGY EVALUATION - SWALLOW  Subjective: The patient was seen on this date for a Clinical Swallow evaluation.  Patient was alert and cooperative with encouragement. As evaluation progressed patient became uncooperative and refused further PO trials.   Primary problem: Hypothermia, altered mental status, RLL pneumonia, GI bleed with bleeding ulcer found on endo, per GI OK for clear liquids.   Medical history: HTN, intellectual disability, psychosis, R hemiparesis, stroke, lives in a group home.  Objective: Textures given included nectar thick liquid, honey thick liquid, and puree consistency.  Assessment: Difficulties were noted with thinner consistencies within the oral phase of her swallow.  Observations: Anterior loss noted with honey and nectar thick consistencies due to labial weakness. She was observed to have dry cough prior to PO trials and at the end of the evaluation but nothing directly following PO trials given. As stated above, difficulty with thinner consistencies noted in oral phase of the swallow with anterior loss. For improved intake of PO she may benefit from thicker options so less anterior loss occurs.   SLP Findings:  Patient presents with suspected chronic oral dysphagia, without esophageal component. Follow up warranted to rule out pharyngeal concern as only minimal PO given before patient became uncooperative with further trials. Due to poor ability to communicate it was difficult to determine why the patient did not want to continue and if she was in pain. She was laid back down into a reclined position and was resting comfortably at the end of the evaluation.   Recommendations: Diet Textures: thin liquid. Clear liquid diet per GI MD.  Medications should be taken crushed  Wife reports worsening confusion over the last several weeks  No asterixis noted. Neuro exam nonfocal  Likely multifactorial in the setting of hypoxia related to heart failure. However in view of new onset atrial fibrillation cannot rule out acute CVA or hyperammonemia in the setting of cirrhosis seen on imaging. Continue with serial neurochecks  CT head reviewed. Questionable small tiny subdural versus dural fold on the CT. Discussed with neurosurgery. Stable for anticoagulation since the repeat CAT scan is stable. by alternate means. As stated above, for improved intake patient would benefit from liquids being thickened to decrease anterior loss and improve oral control of the bolus.   Recommended Strategies: Upright for PO, small bites and sips, alternate liquids and solids, and supervision with all PO. Oral care before breakfast, after all meals and PRN.  Other Recommended Evaluations: Re-evaluation at bedside    Dysphagia therapy is recommended. Rationale: See above.    Note: If advanced to solid diet by MD would likely need puree to start as oral phase of swallow is significantly impaired due to prior medical history.     Monika Bailey MS CCC-SLP 5/8/2023 10:21 CDT    Visit Dx:     ICD-10-CM ICD-9-CM   1. Acute blood loss anemia  D62 285.1   2. Hypovolemic shock  R57.1 785.59   3. Oral phase dysphagia  R13.11 787.21     Patient Active Problem List   Diagnosis   • Septic shock   • Right lower lobe pneumonia   • Intellectual disability   • Anemia   • Thrombocytopenia   • Transaminitis   • Hypoxia   • Metabolic encephalopathy     Past Medical History:   Diagnosis Date   • Gastritis    • Hypertension    • Intellectual disability    • Psychosis    • Right hemiparesis    • Stroke      Past Surgical History:   Procedure Laterality Date   • ENDOSCOPY N/A 5/6/2023    Procedure: ESOPHAGOGASTRODUODENOSCOPY WITH ANESTHESIA;  Surgeon: Sherif Villegas MD;  Location: [facility];  Service: Gastroenterology;  Laterality: N/A;  PRE GI BLEED  POST gastric ulcers,ink and clip   • HIP SURGERY Right 2019       SLP Recommendation and Plan  SLP Swallowing Diagnosis: suspect chronic, mod-severe, oral dysphagia, R/O pharyngeal dysphagia (05/08/23 0750)  SLP Diet Recommendation: clear liquid diet (per MD) (05/08/23 0750)  Recommended Precautions and Strategies: upright posture during/after eating, small bites of food and sips of liquid, check mouth frequently for oral residue/pocketing, general aspiration precautions, fatigue precautions, 1:1  supervision, assist with feeding, reflux precautions (05/08/23 0750)  SLP Rec. for Method of Medication Administration: meds crushed, with puree, as tolerated (05/08/23 0750)     Monitor for Signs of Aspiration: yes, notify SLP if any concerns (05/08/23 0750)  Recommended Diagnostics: reassess via clinical swallow evaluation (05/08/23 0750)  Swallow Criteria for Skilled Therapeutic Interventions Met: demonstrates skilled criteria (05/08/23 0750)  Anticipated Discharge Disposition (SLP): unknown (05/08/23 0750)  Rehab Potential/Prognosis, Swallowing: adequate, monitor progress closely (05/08/23 0750)  Therapy Frequency (Swallow): PRN (05/08/23 0750)  Predicted Duration Therapy Intervention (Days): until discharge (05/08/23 0750)                                        Plan of Care Reviewed With: patient, caregiver (VINAY De La Rosa)  Progress: no change (Initial Evaluation)      SWALLOW EVALUATION (last 72 hours)     SLP Adult Swallow Evaluation     Row Name 05/08/23 0750 05/06/23 0736                Rehab Evaluation    Document Type evaluation  -MG evaluation  -SY (r) AL (t) SY (c)       Subjective Information no complaints  -MG --       Patient Observations alert;cooperative;agree to therapy  -MG --       Patient/Family/Caregiver Comments/Observations No family present.  -MG --       Session Not Performed -- unable to evaluate, medical status change  -SY (r) AL (t) SY (c)       Evaluation Not Performed, Comment -- Patient has GI bleed; Swallowing could therefore not be evaluated  -SY (r) AL (t) SY (c)       Patient Effort good  -MG --       Symptoms Noted During/After Treatment none  -MG --          General Information    Patient Profile Reviewed yes  -MG --       Pertinent History Of Current Problem Primary problem: Hypothermia, altered mental status, RLL pneumonia, GI bleed with bleeding ulcer found on endo, per GI OK for clear liquids. Medical history: HTN, intellectual disability, psychosis, R hemiparesis, stroke,  lives in a group home.  -MG --       Current Method of Nutrition NPO  Ok for clears per GI MD  -MG --       Precautions/Limitations, Vision WFL;for purposes of eval  -MG --       Precautions/Limitations, Hearing WFL;for purposes of eval  -MG --       Prior Level of Function-Communication cognitive-linguistic impairment  unsure of baseline  -MG --       Prior Level of Function-Swallowing unknown  -MG --       Plans/Goals Discussed with patient;other (see comments);agreed upon  VINAY De La Rosa  -MG --       Barriers to Rehab previous functional deficit;cognitive status  -MG --       Patient's Goals for Discharge patient could not state  -MG --          Pain    Additional Documentation Pain Scale: FACES Pre/Post-Treatment (Group)  -MG --          Pain Scale: FACES Pre/Post-Treatment    Pain: FACES Scale, Pretreatment 0-->no hurt  -MG --       Posttreatment Pain Rating 0-->no hurt  -MG --          Oral Motor Structure and Function    Dentition Assessment missing teeth;teeth are in poor condition  -MG --       Secretion Management WNL/WFL  -MG --       Mucosal Quality moist, healthy  -MG --       Volitional Swallow unable to elicit  -MG --       Volitional Cough unable to elicit  -MG --          Oral Musculature and Cranial Nerve Assessment    Oral Motor General Assessment oral labial or buccal impairment;lingual impairment  -MG --       Oral Labial or Buccal Impairment, Detail, Cranial Nerve VII (Facial): CN7: Motor Impairment;left labial droop;reduced ROM;reduced strength bilaterally  worse on L  -MG --       Lingual Impairment, Detail. Cranial Nerves IX, XII (Glossopharyngeal and Hypoglossal) CN12: Motor Impairment;reduced lingual ROM;reduced strength  -MG --          General Eating/Swallowing Observations    Respiratory Support Currently in Use nasal cannula  -MG --       Eating/Swallowing Skills fed by SLP  -MG --       Positioning During Eating upright in bed;needs frequent re-positioning  -MG --       Utensils Used  spoon  -MG --       Consistencies Trialed pureed;honey-thick liquids;nectar/syrup-thick liquids  -MG --          Clinical Swallow Eval    Oral Prep Phase impaired  -MG --       Oral Transit impaired  -MG --       Oral Residue impaired  -MG --       Pharyngeal Phase no overt signs/symptoms of pharyngeal impairment  -MG --       Esophageal Phase unremarkable  -MG --       Clinical Swallow Evaluation Summary See note  -MG --          Oral Prep Concerns    Oral Prep Concerns incomplete or weak lip closure around spoon;anterior loss;oral holding  -MG --       Oral Holding all consistencies  -MG --       Incomplete or Weak Lip Closure Around Spoon all consistencies  -MG --       Anterior Loss nectar;honey  -MG --          Oral Transit Concerns    Oral Transit Concerns delayed initiation of bolus transit  -MG --       Delayed Intiation of Bolus Transit all consistencies  -MG --          Oral Residue Concerns    Oral Residue Concerns diffuse residue throughout oral cavity  -MG --       Diffuse Residue Throughout Oral Cavity all consistencies  -MG --       Oral Residue Concerns, Comment cleared with subsequent swallows  -MG --          SLP Evaluation Clinical Impression    SLP Swallowing Diagnosis suspect chronic;mod-severe;oral dysphagia;R/O pharyngeal dysphagia  -MG --       Functional Impact risk of aspiration/pneumonia;risk of malnutrition;risk of dehydration  -MG --       Rehab Potential/Prognosis, Swallowing adequate, monitor progress closely  -MG --       Swallow Criteria for Skilled Therapeutic Interventions Met demonstrates skilled criteria  -MG --          Recommendations    Therapy Frequency (Swallow) PRN  -MG --       Predicted Duration Therapy Intervention (Days) until discharge  -MG --       SLP Diet Recommendation clear liquid diet  per MD  -MG --       Recommended Diagnostics reassess via clinical swallow evaluation  -MG --       Recommended Precautions and Strategies upright posture during/after eating;small  bites of food and sips of liquid;check mouth frequently for oral residue/pocketing;general aspiration precautions;fatigue precautions;1:1 supervision;assist with feeding;reflux precautions  -MG --       Oral Care Recommendations Oral Care before breakfast, after meals and PRN;Suction toothbrush  -MG --       SLP Rec. for Method of Medication Administration meds crushed;with puree;as tolerated  -MG --       Monitor for Signs of Aspiration yes;notify SLP if any concerns  -MG --       Anticipated Discharge Disposition (SLP) unknown  -MG --          Swallow Goals (SLP)    Swallow LTGs Swallow Long Term Goal (free text)  -MG --       Swallow STGs diet tolerance goal selection (SLP)  -MG --       Diet Tolerance Goal Selection (SLP) Patient will tolerate trials of  -MG --          (LTG) Swallow    (LTG) Swallow Patient will tolerate least restrictive diet without s/s of aspiration.  -MG --       [place] (Swallow Long Term Goal) independently (over 90% accuracy)  -MG --       Time Frame (Swallow Long Term Goal) by discharge  -MG --       Barriers (Swallow Long Term Goal) none  -MG --       Progress/Outcomes (Swallow Long Term Goal) new goal  -MG --          (STG) Patient will tolerate trials of    Consistencies Trialed (Tolerate trials) pureed textures;thin liquids  -MG --       Desired Outcome (Tolerate trials) without signs/symptoms of aspiration;without signs of distress;with adequate oral prep/transit/clearance;with use of compensatory strategies (see comments)  liquid presentation on strong side, positioning during PO  -MG --       [place] (Tolerate trials) independently (over 90% accuracy)  -MG --       Time Frame (Tolerate trials) by discharge  -MG --       Progress/Outcomes (Tolerate trials) new goal  -MG --             User Key  (r) = Recorded By, (t) = Taken By, (c) = Cosigned By    Initials Name Effective Dates    Clemente Caldera RN 11/22/22 -     Monika Rush MS CCC-SLP 08/12/22 -     AL  Leol Heard, Speech Therapy Student 03/06/23 -                 EDUCATION  The patient has been educated in the following areas:   Dysphagia (Swallowing Impairment) Oral Care/Hydration Modified Diet Instruction.        SLP GOALS     Row Name 05/08/23 0750             (LTG) Swallow    (LTG) Swallow Patient will tolerate least restrictive diet without s/s of aspiration.  -MG      [place] (Swallow Long Term Goal) independently (over 90% accuracy)  -MG      Time Frame (Swallow Long Term Goal) by discharge  -MG      Barriers (Swallow Long Term Goal) none  -MG      Progress/Outcomes (Swallow Long Term Goal) new goal  -MG         (STG) Patient will tolerate trials of    Consistencies Trialed (Tolerate trials) pureed textures;thin liquids  -MG      Desired Outcome (Tolerate trials) without signs/symptoms of aspiration;without signs of distress;with adequate oral prep/transit/clearance;with use of compensatory strategies (see comments)  liquid presentation on strong side, positioning during PO  -MG      [place] (Tolerate trials) independently (over 90% accuracy)  -MG      Time Frame (Tolerate trials) by discharge  -MG      Progress/Outcomes (Tolerate trials) new goal  -MG            User Key  (r) = Recorded By, (t) = Taken By, (c) = Cosigned By    Initials Name Provider Type    MG Monika Bailey, MS CCC-SLP Speech and Language Pathologist                   Time Calculation:    Time Calculation- SLP     Row Name 05/08/23 1020             Time Calculation- SLP    SLP Start Time 0750  -MG      SLP Stop Time 0850  -MG      SLP Time Calculation (min) 60 min  -MG      SLP Received On 05/08/23  -MG      SLP Goal Re-Cert Due Date 05/18/23  -MG         Untimed Charges    SLP Eval/Re-eval  ST Eval Oral Pharyng Swallow - 82029  -MG      01133-MZ Eval Oral Pharyng Swallow Minutes 60  -MG         Total Minutes    Untimed Charges Total Minutes 60  -MG       Total Minutes 60  -MG            User Key  (r) = Recorded By,  (t) = Taken By, (c) = Cosigned By    Initials Name Provider Type     Monika Bailey, MS CCC-SLP Speech and Language Pathologist                Therapy Charges for Today     Code Description Service Date Service Provider Modifiers Qty    [id number]  ST EVAL ORAL PHARYNG SWALLOW 4 5/8/2023 Monika Bailey, MS CCC-SLP GN 1               Monika Bailey MS CCC-SLP  5/8/2023

## 2023-10-24 NOTE — ASSESSMENT & PLAN NOTE
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 However he has been off all his medications since February 2023. Follow-up on hemoglobin A1c and start sliding scale insulin. May need titration and adjustment of insulin regimen based on blood glucose control.

## 2023-10-24 NOTE — ASSESSMENT & PLAN NOTE
Present admission with heart rate in the 120s. Received 1 dose of IV Cardizem in the emergency room. Subsequently heart rate varying between 100-1 10. Continue with telemetry monitoring  Initiate Lopressor 25mg twice daily  [id number] score 4  Discussed with wife about anticoagulation-plan was for anticoagulation. CT head is concerning for tiny subdural hemorrhage. Anticoagulation was on hold. But on repeat imaging it remained stable. Case was discussed with neurosurgery physician assistant. Cleared for anticoagulation if medically necessary. Discussed with the wife. Wife reported that she would like to hold off on anticoagulation. She reported that she talked to cardiology and cardiology gave her very poor prognosis for him and he is not a surgical candidate. He understand his prognosis and would like to hold anticoagulation for now. Risk and benefit of anticoagulation versus known anticoagulation was explained to the wife- wife, daughter, and son are ok with heparin while inpatient, initially were not interested in [facility] after discharge, but family thought more on this and would like to do eliquis on discharge for prevention of stroke. They understand the risk of bleeding associated and would like to proceed. Eliquis 2.5mg bid sent to pharmacy for price check - $10, patient's wife agreeable to this cost  Heparin gtt d/c and eliquis 2.5 mg bid started.

## 2023-10-24 NOTE — ASSESSMENT & PLAN NOTE
Patient's spouse Juvencio Goldsmith, daughter, and son present at bedside and discussing goals of care with patient. She states that he would not want to be living like this and would like patient to go home on hospice when as stable as possible. Would like to try and get the fluid off him and then have him come back home. Does not want any extensive measures taking or any procedures. Does not want IV antibiotics or treatment of osteomyelitis. Discussed that patient can progress to sepsis, septic shock, have limb loss, and death sooner due to not treating active infection, patient's wife verbalized understanding stating "he has had this toe wound for so long and was likely infected the whole time, but we don't want any antibiotics". She does not want podiatry consult either. She would like patient to  be comfortable, biggest concern is getting more of the fluid off him so he can be comfortable for discharge, but would not want anything further after discharge from the hospital. [facility] with heparin at this time to prevent stroke while inpatient. Discussed with cardiology at bedside regarding patient's anticoagulation status - would like to be discharged with anticoagulation at this time. Would not want patient having a stroke. Eliquis 2.5 mg bid sent to pharmacy for price check. Will consult CM for hospice assistance for when patient stable for discharge.    POLST complete and in patient's chart  See acp note

## 2023-10-24 NOTE — PLAN OF CARE
Problem: Potential for Falls  Goal: Patient will remain free of falls  Description: INTERVENTIONS:  - Educate patient/family on patient safety including physical limitations  - Instruct patient to call for assistance with activity   - Consult OT/PT to assist with strengthening/mobility   - Keep Call bell within reach  - Keep bed low and locked with side rails adjusted as appropriate  - Keep care items and personal belongings within reach  - Initiate and maintain comfort rounds  - Make Fall Risk Sign visible to staff  - Offer Toileting every 2 Hours, in advance of need  - Initiate/Maintain bed/chair alarm  - Obtain necessary fall risk management equipment:   - Apply yellow socks and bracelet for high fall risk patients  - Consider moving patient to room near nurses station  Outcome: Progressing     Problem: NEUROSENSORY - ADULT  Goal: Achieves stable or improved neurological status  Description: INTERVENTIONS  - Monitor and report changes in neurological status  - Monitor vital signs such as temperature, blood pressure, glucose, and any other labs ordered   - Initiate measures to prevent increased intracranial pressure  - Monitor for seizure activity and implement precautions if appropriate      Outcome: Progressing  Goal: Achieves maximal functionality and self care  Description: INTERVENTIONS  - Monitor swallowing and airway patency with patient fatigue and changes in neurological status  - Encourage and assist patient to increase activity and self care.    - Encourage visually impaired, hearing impaired and aphasic patients to use assistive/communication devices  Outcome: Progressing     Problem: CARDIOVASCULAR - ADULT  Goal: Maintains optimal cardiac output and hemodynamic stability  Description: INTERVENTIONS:  - Monitor I/O, vital signs and rhythm  - Monitor for S/S and trends of decreased cardiac output  - Administer and titrate ordered vasoactive medications to optimize hemodynamic stability  - Assess quality of pulses, skin color and temperature  - Assess for signs of decreased coronary artery perfusion  - Instruct patient to report change in severity of symptoms  Outcome: Progressing  Goal: Absence of cardiac dysrhythmias or at baseline rhythm  Description: INTERVENTIONS:  - Continuous cardiac monitoring, vital signs, obtain 12 lead EKG if ordered  - Administer antiarrhythmic and heart rate control medications as ordered  - Monitor electrolytes and administer replacement therapy as ordered  Outcome: Progressing     Problem: RESPIRATORY - ADULT  Goal: Achieves optimal ventilation and oxygenation  Description: INTERVENTIONS:  - Assess for changes in respiratory status  - Assess for changes in mentation and behavior  - Position to facilitate oxygenation and minimize respiratory effort  - Oxygen administered by appropriate delivery if ordered  - Initiate smoking cessation education as indicated  - Encourage broncho-pulmonary hygiene including cough, deep breathe, Incentive Spirometry  - Assess the need for suctioning and aspirate as needed  - Assess and instruct to report SOB or any respiratory difficulty  - Respiratory Therapy support as indicated  Outcome: Progressing     Problem: GENITOURINARY - ADULT  Goal: Maintains or returns to baseline urinary function  Description: INTERVENTIONS:  - Assess urinary function  - Encourage oral fluids to ensure adequate hydration if ordered  - Administer IV fluids as ordered to ensure adequate hydration  - Administer ordered medications as needed  - Offer frequent toileting  - Follow urinary retention protocol if ordered  Outcome: Progressing  Goal: Absence of urinary retention  Description: INTERVENTIONS:  - Assess patient’s ability to void and empty bladder  - Monitor I/O  - Bladder scan as needed  - Discuss with physician/AP medications to alleviate retention as needed  - Discuss catheterization for long term situations as appropriate  Outcome: Progressing  Goal: Urinary catheter remains patent  Description: INTERVENTIONS:  - Assess patency of urinary catheter  - If patient has a chronic brian, consider changing catheter if non-functioning  - Follow guidelines for intermittent irrigation of non-functioning urinary catheter  Outcome: Progressing     Problem: METABOLIC, FLUID AND ELECTROLYTES - ADULT  Goal: Electrolytes maintained within normal limits  Description: INTERVENTIONS:  - Monitor labs and assess patient for signs and symptoms of electrolyte imbalances  - Administer electrolyte replacement as ordered  - Monitor response to electrolyte replacements, including repeat lab results as appropriate  - Instruct patient on fluid and nutrition as appropriate  Outcome: Progressing  Goal: Fluid balance maintained  Description: INTERVENTIONS:  - Monitor labs   - Monitor I/O and WT  - Instruct patient on fluid and nutrition as appropriate  - Assess for signs & symptoms of volume excess or deficit  Outcome: Progressing  Goal: Glucose maintained within target range  Description: INTERVENTIONS:  - Monitor Blood Glucose as ordered  - Assess for signs and symptoms of hyperglycemia and hypoglycemia  - Administer ordered medications to maintain glucose within target range  - Assess nutritional intake and initiate nutrition service referral as needed  Outcome: Progressing      Problem: MUSCULOSKELETAL - ADULT  Goal: Maintain or return mobility to safest level of function  Description: INTERVENTIONS:  - Assess patient's ability to carry out ADLs; assess patient's baseline for ADL function and identify physical deficits which impact ability to perform ADLs (bathing, care of mouth/teeth, toileting, grooming, dressing, etc.)  - Assess/evaluate cause of self-care deficits   - Assess range of motion  - Assess patient's mobility  - Assess patient's need for assistive devices and provide as appropriate  - Encourage maximum independence but intervene and supervise when necessary  - Involve family in performance of ADLs  - Assess for home care needs following discharge   - Consider OT consult to assist with ADL evaluation and planning for discharge  - Provide patient education as appropriate  Outcome: Progressing  Goal: Maintain proper alignment of affected body part  Description: INTERVENTIONS:  - Support, maintain and protect limb and body alignment  - Provide patient/ family with appropriate education  Outcome: Progressing     Problem: MOBILITY - ADULT  Goal: Maintain or return to baseline ADL function  Description: INTERVENTIONS:  -  Assess patient's ability to carry out ADLs; assess patient's baseline for ADL function and identify physical deficits which impact ability to perform ADLs (bathing, care of mouth/teeth, toileting, grooming, dressing, etc.)  - Assess/evaluate cause of self-care deficits   - Assess range of motion  - Assess patient's mobility; develop plan if impaired  - Assess patient's need for assistive devices and provide as appropriate  - Encourage maximum independence but intervene and supervise when necessary  - Involve family in performance of ADLs  - Assess for home care needs following discharge   - Consider OT consult to assist with ADL evaluation and planning for discharge  - Provide patient education as appropriate  Outcome: Progressing  Goal: Maintains/Returns to pre admission functional level  Description: INTERVENTIONS:  - Perform BMAT or MOVE assessment daily.   - Set and communicate daily mobility goal to care team and patient/family/caregiver. - Collaborate with rehabilitation services on mobility goals if consulted  - Perform Range of Motion 2 times a day. - Reposition patient every 2 hours.   - Dangle patient 2 times a day  - Stand patient 2 times a day  - Ambulate patient 2 times a day  - Out of bed to chair 2 times a day   - Out of bed for meals 2 times a day  - Out of bed for toileting  - Record patient progress and toleration of activity level   Outcome: Progressing Problem: Prexisting or High Potential for Compromised Skin Integrity  Goal: Skin integrity is maintained or improved  Description: INTERVENTIONS:  - Identify patients at risk for skin breakdown  - Assess and monitor skin integrity  - Assess and monitor nutrition and hydration status  - Monitor labs   - Assess for incontinence   - Turn and reposition patient  - Assist with mobility/ambulation  - Relieve pressure over bony prominences  - Avoid friction and shearing  - Provide appropriate hygiene as needed including keeping skin clean and dry  - Evaluate need for skin moisturizer/barrier cream  - Collaborate with interdisciplinary team   - Patient/family teaching  - Consider wound care consult   Outcome: Progressing     Problem: Nutrition/Hydration-ADULT  Goal: Nutrient/Hydration intake appropriate for improving, restoring or maintaining nutritional needs  Description: Monitor and assess patient's nutrition/hydration status for malnutrition. Collaborate with interdisciplinary team and initiate plan and interventions as ordered. Monitor patient's weight and dietary intake as ordered or per policy. Utilize nutrition screening tool and intervene as necessary. Determine patient's food preferences and provide high-protein, high-caloric foods as appropriate.      INTERVENTIONS:  - Monitor oral intake, urinary output, labs, and treatment plans  - Assess nutrition and hydration status and recommend course of action  - Evaluate amount of meals eaten  - Assist patient with eating if necessary   - Allow adequate time for meals  - Recommend/ encourage appropriate diets, oral nutritional supplements, and vitamin/mineral supplements  - Order, calculate, and assess calorie counts as needed  - Recommend, monitor, and adjust tube feedings and TPN/PPN based on assessed needs  - Assess need for intravenous fluids  - Provide specific nutrition/hydration education as appropriate  - Include patient/family/caregiver in decisions related to nutrition  Outcome: Progressing

## 2023-10-25 LAB
ANION GAP SERPL CALCULATED.3IONS-SCNC: 8 MMOL/L
ANION GAP SERPL CALCULATED.3IONS-SCNC: 8 MMOL/L
BACTERIA BLD CULT: NORMAL
BACTERIA BLD CULT: NORMAL
BUN SERPL-MCNC: 33 MG/DL (ref 5–25)
BUN SERPL-MCNC: 33 MG/DL (ref 5–25)
CALCIUM SERPL-MCNC: 8.9 MG/DL (ref 8.4–10.2)
CALCIUM SERPL-MCNC: 9 MG/DL (ref 8.4–10.2)
CHLORIDE SERPL-SCNC: 97 MMOL/L (ref 96–108)
CHLORIDE SERPL-SCNC: 98 MMOL/L (ref 96–108)
CO2 SERPL-SCNC: 37 MMOL/L (ref 21–32)
CO2 SERPL-SCNC: 37 MMOL/L (ref 21–32)
CREAT SERPL-MCNC: 1.51 MG/DL (ref 0.6–1.3)
CREAT SERPL-MCNC: 1.52 MG/DL (ref 0.6–1.3)
ERYTHROCYTE [DISTWIDTH] IN BLOOD BY AUTOMATED COUNT: 16.5 % (ref 11.6–15.1)
GFR SERPL CREATININE-BSD FRML MDRD: 41 ML/MIN/1.73SQ M
GFR SERPL CREATININE-BSD FRML MDRD: 41 ML/MIN/1.73SQ M
GLUCOSE SERPL-MCNC: 137 MG/DL (ref 65–140)
GLUCOSE SERPL-MCNC: 148 MG/DL (ref 65–140)
GLUCOSE SERPL-MCNC: 152 MG/DL (ref 65–140)
GLUCOSE SERPL-MCNC: 165 MG/DL (ref 65–140)
GLUCOSE SERPL-MCNC: 180 MG/DL (ref 65–140)
GLUCOSE SERPL-MCNC: 198 MG/DL (ref 65–140)
HCT VFR BLD AUTO: 46.2 % (ref 36.5–49.3)
HGB BLD-MCNC: 14.5 G/DL (ref 12–17)
MAGNESIUM SERPL-MCNC: 2 MG/DL (ref 1.9–2.7)
MCH RBC QN AUTO: 31 PG (ref 26.8–34.3)
MCHC RBC AUTO-ENTMCNC: 31.4 G/DL (ref 31.4–37.4)
MCV RBC AUTO: 99 FL (ref 82–98)
PLATELET # BLD AUTO: 110 THOUSANDS/UL (ref 149–390)
PMV BLD AUTO: 12.2 FL (ref 8.9–12.7)
POTASSIUM SERPL-SCNC: 3.6 MMOL/L (ref 3.5–5.3)
POTASSIUM SERPL-SCNC: 3.7 MMOL/L (ref 3.5–5.3)
RBC # BLD AUTO: 4.68 MILLION/UL (ref 3.88–5.62)
SODIUM SERPL-SCNC: 142 MMOL/L (ref 135–147)
SODIUM SERPL-SCNC: 143 MMOL/L (ref 135–147)
WBC # BLD AUTO: 6.53 THOUSAND/UL (ref 4.31–10.16)

## 2023-10-25 PROCEDURE — 85027 COMPLETE CBC AUTOMATED: CPT

## 2023-10-25 PROCEDURE — 82948 REAGENT STRIP/BLOOD GLUCOSE: CPT

## 2023-10-25 PROCEDURE — 83735 ASSAY OF MAGNESIUM: CPT

## 2023-10-25 PROCEDURE — 80048 BASIC METABOLIC PNL TOTAL CA: CPT | Performed by: NURSE PRACTITIONER

## 2023-10-25 PROCEDURE — 99232 SBSQ HOSP IP/OBS MODERATE 35: CPT

## 2023-10-25 PROCEDURE — 80048 BASIC METABOLIC PNL TOTAL CA: CPT

## 2023-10-25 RX ADMIN — INSULIN LISPRO 2 UNITS: 100 INJECTION, SOLUTION INTRAVENOUS; SUBCUTANEOUS at 13:59

## 2023-10-25 RX ADMIN — OXYCODONE HYDROCHLORIDE 5 MG: 5 TABLET ORAL at 09:11

## 2023-10-25 RX ADMIN — LIDOCAINE 5% 1 PATCH: 700 PATCH TOPICAL at 09:31

## 2023-10-25 RX ADMIN — APIXABAN 2.5 MG: 2.5 TABLET, FILM COATED ORAL at 18:11

## 2023-10-25 RX ADMIN — APIXABAN 2.5 MG: 2.5 TABLET, FILM COATED ORAL at 09:30

## 2023-10-25 RX ADMIN — HYDROCORTISONE: 25 CREAM TOPICAL at 09:31

## 2023-10-25 RX ADMIN — METOPROLOL SUCCINATE 25 MG: 25 TABLET, EXTENDED RELEASE ORAL at 09:31

## 2023-10-25 RX ADMIN — HYDROCORTISONE: 25 CREAM TOPICAL at 18:12

## 2023-10-25 RX ADMIN — INSULIN LISPRO 1 UNITS: 100 INJECTION, SOLUTION INTRAVENOUS; SUBCUTANEOUS at 21:31

## 2023-10-25 RX ADMIN — METOPROLOL SUCCINATE 25 MG: 25 TABLET, EXTENDED RELEASE ORAL at 21:31

## 2023-10-25 RX ADMIN — INSULIN LISPRO 1 UNITS: 100 INJECTION, SOLUTION INTRAVENOUS; SUBCUTANEOUS at 18:11

## 2023-10-25 RX ADMIN — Medication 10 MG/HR: at 06:03

## 2023-10-25 RX ADMIN — POTASSIUM CHLORIDE 20 MEQ: 1500 TABLET, EXTENDED RELEASE ORAL at 09:31

## 2023-10-25 RX ADMIN — POTASSIUM CHLORIDE 20 MEQ: 1500 TABLET, EXTENDED RELEASE ORAL at 18:11

## 2023-10-25 NOTE — ASSESSMENT & PLAN NOTE
Lab Results   Component Value Date    EGFR 41 10/25/2023    EGFR 39 10/24/2023    EGFR 38 10/24/2023    CREATININE 1.52 (H) 10/25/2023    CREATININE 1.58 (H) 10/24/2023    CREATININE 1.60 (H) 10/24/2023   Likely secondary to diabetic nephropathy. Baseline creatinine 1.6. Creatinine 1.88 on the day of admission. Patient with urinary retention. Status post Melchor catheter placed  Monitor BID BMP with lasix gtt.

## 2023-10-25 NOTE — ASSESSMENT & PLAN NOTE
Lab Results   Component Value Date    HGBA1C 7.5 (H) 10/20/2023       Recent Labs     10/24/23  1114 10/24/23  1558 10/24/23  2055 10/25/23  0726   POCGLU 172* 146* 205* 137         Blood Sugar Average: Last 72 hrs:  (P) [id number]  Patient has history of type 2 diabetes with underlying chronic kidney disease likely secondary to diabetic nephropathy. However he has been off all his medications since February 2023. Follow-up on hemoglobin A1c and start sliding scale insulin.

## 2023-10-25 NOTE — ASSESSMENT & PLAN NOTE
Present admission with heart rate in the 120s. Received 1 dose of IV Cardizem in the emergency room. Subsequently heart rate varying between 100-1 10. Continue with telemetry monitoring  Initiate Lopressor 25mg twice daily  [id number] score 4  Discussed with wife about anticoagulation-plan was for anticoagulation. CT head is concerning for tiny subdural hemorrhage. Anticoagulation was on hold. But on repeat imaging it remained stable. Case was discussed with neurosurgery physician assistant. Cleared for anticoagulation if medically necessary. Discussed with the wife. Wife reported that she would like to hold off on anticoagulation. She reported that she talked to cardiology and cardiology gave her very poor prognosis for him and he is not a surgical candidate. He understand his prognosis and would like to hold anticoagulation for now. Risk and benefit of anticoagulation versus known anticoagulation was explained to the wife- wife, daughter, and son are ok with heparin while inpatient, initially were not interested in [facility] after discharge, but family thought more on this and would like to do eliquis on discharge for prevention of stroke. They understand the risk of bleeding associated and would like to proceed. Eliquis 2.5mg bid sent to pharmacy for price check - $10, patient's wife agreeable to this cost  Heparin gtt d/c and eliquis 2.5 mg bid started.

## 2023-10-25 NOTE — ASSESSMENT & PLAN NOTE
Wt Readings from Last 3 Encounters:   10/25/23 88.8 kg (195 lb 12.3 oz)     Patient presents with worsening shortness of breath abdominal distention and lower extremity edema. Has no known prior history of congestive heart failure however has however has not seen a physician in 3 years and had stopped taking all his medications since February 2023  CT scan of the chest shows bilateral large pleural effusion, cardiomegaly, large ascites  Wife at bedside reports increased weight gain  Patient hypoxic requiring 3 L of supplemental oxygen via nasal cannula - has been weaned to room air  Will initiate treatment with IV furosemide 40 mg twice daily. -Patient is net -3 L today morning   Monitor daily weight and intake and output. Continue with current diuresis  Echo: Ejection fraction is 25 -29% with diffuse global hypokinesis. Noted to have moderate to severe TR and severe MR. Patient's wife reported that she talk to cardiology yesterday and cardiology gave him a very poor prognosis. -Patient is not a surgical candidate. Wife is considering hospice-we will consult hospice through case management  Cardiology: continue lasix gtt 10 mg/hr and BMP BID  Unfortunately, lasix gtt was not started initially, started on 10/22 - assess response.

## 2023-10-25 NOTE — PLAN OF CARE
Problem: Potential for Falls  Goal: Patient will remain free of falls  Description: INTERVENTIONS:  - Educate patient/family on patient safety including physical limitations  - Instruct patient to call for assistance with activity   - Consult OT/PT to assist with strengthening/mobility   - Keep Call bell within reach  - Keep bed low and locked with side rails adjusted as appropriate  - Keep care items and personal belongings within reach  - Initiate and maintain comfort rounds  - Make Fall Risk Sign visible to staff  - Offer Toileting every  Hours, in advance of need  - Initiate/Maintain alarm  - Obtain necessary fall risk management equipment:   - Apply yellow socks and bracelet for high fall risk patients  - Consider moving patient to room near nurses station  Outcome: Progressing     Problem: NEUROSENSORY - ADULT  Goal: Achieves stable or improved neurological status  Description: INTERVENTIONS  - Monitor and report changes in neurological status  - Monitor vital signs such as temperature, blood pressure, glucose, and any other labs ordered   - Initiate measures to prevent increased intracranial pressure  - Monitor for seizure activity and implement precautions if appropriate      Outcome: Progressing  Goal: Achieves maximal functionality and self care  Description: INTERVENTIONS  - Monitor swallowing and airway patency with patient fatigue and changes in neurological status  - Encourage and assist patient to increase activity and self care.    - Encourage visually impaired, hearing impaired and aphasic patients to use assistive/communication devices  Outcome: Progressing     Problem: CARDIOVASCULAR - ADULT  Goal: Maintains optimal cardiac output and hemodynamic stability  Description: INTERVENTIONS:  - Monitor I/O, vital signs and rhythm  - Monitor for S/S and trends of decreased cardiac output  - Administer and titrate ordered vasoactive medications to optimize hemodynamic stability  - Assess quality of pulses, skin color and temperature  - Assess for signs of decreased coronary artery perfusion  - Instruct patient to report change in severity of symptoms  Outcome: Progressing  Goal: Absence of cardiac dysrhythmias or at baseline rhythm  Description: INTERVENTIONS:  - Continuous cardiac monitoring, vital signs, obtain 12 lead EKG if ordered  - Administer antiarrhythmic and heart rate control medications as ordered  - Monitor electrolytes and administer replacement therapy as ordered  Outcome: Progressing     Problem: RESPIRATORY - ADULT  Goal: Achieves optimal ventilation and oxygenation  Description: INTERVENTIONS:  - Assess for changes in respiratory status  - Assess for changes in mentation and behavior  - Position to facilitate oxygenation and minimize respiratory effort  - Oxygen administered by appropriate delivery if ordered  - Initiate smoking cessation education as indicated  - Encourage broncho-pulmonary hygiene including cough, deep breathe, Incentive Spirometry  - Assess the need for suctioning and aspirate as needed  - Assess and instruct to report SOB or any respiratory difficulty  - Respiratory Therapy support as indicated  Outcome: Progressing     Problem: GENITOURINARY - ADULT  Goal: Maintains or returns to baseline urinary function  Description: INTERVENTIONS:  - Assess urinary function  - Encourage oral fluids to ensure adequate hydration if ordered  - Administer IV fluids as ordered to ensure adequate hydration  - Administer ordered medications as needed  - Offer frequent toileting  - Follow urinary retention protocol if ordered  Outcome: Progressing  Goal: Absence of urinary retention  Description: INTERVENTIONS:  - Assess patient’s ability to void and empty bladder  - Monitor I/O  - Bladder scan as needed  - Discuss with physician/AP medications to alleviate retention as needed  - Discuss catheterization for long term situations as appropriate  Outcome: Progressing  Goal: Urinary catheter remains patent  Description: INTERVENTIONS:  - Assess patency of urinary catheter  - If patient has a chronic brian, consider changing catheter if non-functioning  - Follow guidelines for intermittent irrigation of non-functioning urinary catheter  Outcome: Progressing     Problem: METABOLIC, FLUID AND ELECTROLYTES - ADULT  Goal: Electrolytes maintained within normal limits  Description: INTERVENTIONS:  - Monitor labs and assess patient for signs and symptoms of electrolyte imbalances  - Administer electrolyte replacement as ordered  - Monitor response to electrolyte replacements, including repeat lab results as appropriate  - Instruct patient on fluid and nutrition as appropriate  Outcome: Progressing  Goal: Fluid balance maintained  Description: INTERVENTIONS:  - Monitor labs   - Monitor I/O and WT  - Instruct patient on fluid and nutrition as appropriate  - Assess for signs & symptoms of volume excess or deficit  Outcome: Progressing  Goal: Glucose maintained within target range  Description: INTERVENTIONS:  - Monitor Blood Glucose as ordered  - Assess for signs and symptoms of hyperglycemia and hypoglycemia  - Administer ordered medications to maintain glucose within target range  - Assess nutritional intake and initiate nutrition service referral as needed  Outcome: Progressing     Problem: SKIN/TISSUE INTEGRITY - ADULT  Goal: Skin Integrity remains intact(Skin Breakdown Prevention)  Description: Assess:  -Perform Hector assessment every   -Clean and moisturize skin every   -Inspect skin when repositioning, toileting, and assisting with ADLS  -Assess under medical devices such as  every   -Assess extremities for adequate circulation and sensation     Bed Management:  -Have minimal linens on bed & keep smooth, unwrinkled  -Change linens as needed when moist or perspiring  -Avoid sitting or lying in one position for more than  hours while in bed  -Keep HOB at degrees     Toileting:  -Offer bedside commode  -Assess for incontinence every   -Use incontinent care products after each incontinent episode such as     Activity:  -Mobilize patient  times a day  -Encourage activity and walks on unit  -Encourage or provide ROM exercises   -Turn and reposition patient every  Hours  -Use appropriate equipment to lift or move patient in bed  -Instruct/ Assist with weight shifting every  when out of bed in chair  -Consider limitation of chair time  hour intervals    Skin Care:  -Avoid use of baby powder, tape, friction and shearing, hot water or constrictive clothing  -Relieve pressure over bony prominences using   -Do not massage red bony areas    Next Steps:  -Teach patient strategies to minimize risks such as    -Consider consults to  interdisciplinary teams such as   Outcome: Progressing  Goal: Incision(s), wounds(s) or drain site(s) healing without S/S of infection  Description: INTERVENTIONS  - Assess and document dressing, incision, wound bed, drain sites and surrounding tissue  - Provide patient and family education  - Perform skin care/dressing changes every   Outcome: Progressing  Goal: Pressure injury heals and does not worsen  Description: Interventions:  - Implement low air loss mattress or specialty surface (Criteria met)  - Apply silicone foam dressing  - Instruct/assist with weight shifting every  minutes when in chair   - Limit chair time to  hour intervals  - Use special pressure reducing interventions such as  when in chair   - Apply fecal or urinary incontinence containment device   - Perform passive or active ROM every   - Turn and reposition patient & offload bony prominences every  hours   - Utilize friction reducing device or surface for transfers   - Consider consults to  interdisciplinary teams such as   - Use incontinent care products after each incontinent episode such as   - Consider nutrition services referral as needed  Outcome: Progressing     Problem: MUSCULOSKELETAL - ADULT  Goal: Maintain or return mobility to safest level of function  Description: INTERVENTIONS:  - Assess patient's ability to carry out ADLs; assess patient's baseline for ADL function and identify physical deficits which impact ability to perform ADLs (bathing, care of mouth/teeth, toileting, grooming, dressing, etc.)  - Assess/evaluate cause of self-care deficits   - Assess range of motion  - Assess patient's mobility  - Assess patient's need for assistive devices and provide as appropriate  - Encourage maximum independence but intervene and supervise when necessary  - Involve family in performance of ADLs  - Assess for home care needs following discharge   - Consider OT consult to assist with ADL evaluation and planning for discharge  - Provide patient education as appropriate  Outcome: Progressing  Goal: Maintain proper alignment of affected body part  Description: INTERVENTIONS:  - Support, maintain and protect limb and body alignment  - Provide patient/ family with appropriate education  Outcome: Progressing     Problem: MOBILITY - ADULT  Goal: Maintain or return to baseline ADL function  Description: INTERVENTIONS:  -  Assess patient's ability to carry out ADLs; assess patient's baseline for ADL function and identify physical deficits which impact ability to perform ADLs (bathing, care of mouth/teeth, toileting, grooming, dressing, etc.)  - Assess/evaluate cause of self-care deficits   - Assess range of motion  - Assess patient's mobility; develop plan if impaired  - Assess patient's need for assistive devices and provide as appropriate  - Encourage maximum independence but intervene and supervise when necessary  - Involve family in performance of ADLs  - Assess for home care needs following discharge   - Consider OT consult to assist with ADL evaluation and planning for discharge  - Provide patient education as appropriate  Outcome: Progressing  Goal: Maintains/Returns to pre admission functional level  Description: INTERVENTIONS:  - Perform BMAT or MOVE assessment daily.   - Set and communicate daily mobility goal to care team and patient/family/caregiver. - Collaborate with rehabilitation services on mobility goals if consulted  - Perform Range of Motion  times a day. - Reposition patient every  hours. - Dangle patient  times a day  - Stand patient  times a day  - Ambulate patient  times a day  - Out of bed to chair  times a day   - Out of bed for meals times a day  - Out of bed for toileting  - Record patient progress and toleration of activity level   Outcome: Progressing     Problem: Prexisting or High Potential for Compromised Skin Integrity  Goal: Skin integrity is maintained or improved  Description: INTERVENTIONS:  - Identify patients at risk for skin breakdown  - Assess and monitor skin integrity  - Assess and monitor nutrition and hydration status  - Monitor labs   - Assess for incontinence   - Turn and reposition patient  - Assist with mobility/ambulation  - Relieve pressure over bony prominences  - Avoid friction and shearing  - Provide appropriate hygiene as needed including keeping skin clean and dry  - Evaluate need for skin moisturizer/barrier cream  - Collaborate with interdisciplinary team   - Patient/family teaching  - Consider wound care consult   Outcome: Progressing     Problem: Nutrition/Hydration-ADULT  Goal: Nutrient/Hydration intake appropriate for improving, restoring or maintaining nutritional needs  Description: Monitor and assess patient's nutrition/hydration status for malnutrition. Collaborate with interdisciplinary team and initiate plan and interventions as ordered. Monitor patient's weight and dietary intake as ordered or per policy. Utilize nutrition screening tool and intervene as necessary. Determine patient's food preferences and provide high-protein, high-caloric foods as appropriate.      INTERVENTIONS:  - Monitor oral intake, urinary output, labs, and treatment plans  - Assess nutrition and hydration status and recommend course of action  - Evaluate amount of meals eaten  - Assist patient with eating if necessary   - Allow adequate time for meals  - Recommend/ encourage appropriate diets, oral nutritional supplements, and vitamin/mineral supplements  - Order, calculate, and assess calorie counts as needed  - Recommend, monitor, and adjust tube feedings and TPN/PPN based on assessed needs  - Assess need for intravenous fluids  - Provide specific nutrition/hydration education as appropriate  - Include patient/family/caregiver in decisions related to nutrition  Outcome: Progressing

## 2023-10-25 NOTE — ASSESSMENT & PLAN NOTE
Noted to have thrombocytopenia on labs. No evidence of active or acute bleeding. Starting on heparin gtt due to a fib history. Patient's wife agreeable to [facility] at this time.   Will monitor

## 2023-10-25 NOTE — PROGRESS NOTES
[address] [facility]  Progress Note  Name: Andriy Bueno  MRN: [id number]  Unit/Bed#: -39 I Date of Admission: 10/20/2023   Date of Service: 10/25/2023 I Hospital Day: 5    Assessment/Plan   * Acute combined systolic and diastolic congestive heart failure (HCC)  Assessment & Plan  Wt Readings from Last 3 Encounters:   10/25/23 88.8 kg (195 lb 12.3 oz)     Patient presents with worsening shortness of breath abdominal distention and lower extremity edema. Has no known prior history of congestive heart failure however has however has not seen a physician in 3 years and had stopped taking all his medications since February 2023  CT scan of the chest shows bilateral large pleural effusion, cardiomegaly, large ascites  Wife at bedside reports increased weight gain  Patient hypoxic requiring 3 L of supplemental oxygen via nasal cannula - has been weaned to room air  Will initiate treatment with IV furosemide 40 mg twice daily. -Patient is net -3 L today morning   Monitor daily weight and intake and output. Continue with current diuresis  Echo: Ejection fraction is 25 -29% with diffuse global hypokinesis. Noted to have moderate to severe TR and severe MR. Patient's wife reported that she talk to cardiology yesterday and cardiology gave him a very poor prognosis. -Patient is not a surgical candidate. Wife is considering hospice-we will consult hospice through case management  Cardiology: continue lasix gtt 10 mg/hr and BMP BID  Unfortunately, lasix gtt was not started initially, started on 10/22 - assess response. Atrial fibrillation with RVR ([address])  Assessment & Plan  Present admission with heart rate in the 120s. Received 1 dose of IV Cardizem in the emergency room. Subsequently heart rate varying between 100-1 10. Continue with telemetry monitoring  Initiate Lopressor 25mg twice daily  [id number] score 4  Discussed with wife about anticoagulation-plan was for anticoagulation.    CT head is concerning for tiny subdural hemorrhage. Anticoagulation was on hold. But on repeat imaging it remained stable. Case was discussed with neurosurgery physician assistant. Cleared for anticoagulation if medically necessary. Discussed with the wife. Wife reported that she would like to hold off on anticoagulation. She reported that she talked to cardiology and cardiology gave her very poor prognosis for him and he is not a surgical candidate. He understand his prognosis and would like to hold anticoagulation for now. Risk and benefit of anticoagulation versus known anticoagulation was explained to the wife- wife, daughter, and son are ok with heparin while inpatient, initially were not interested in [facility] after discharge, but family thought more on this and would like to do eliquis on discharge for prevention of stroke. They understand the risk of bleeding associated and would like to proceed. Eliquis 2.5mg bid sent to pharmacy for price check - $10, patient's wife agreeable to this cost  Heparin gtt d/c and eliquis 2.5 mg bid started. Goals of care, counseling/discussion  Assessment & Plan  Patient's spouse Lonnie Angulo, daughter, and son present at bedside and discussing goals of care with patient. She states that he would not want to be living like this and would like patient to go home on hospice when as stable as possible. Would like to try and get the fluid off him and then have him come back home. Does not want any extensive measures taking or any procedures. Does not want IV antibiotics or treatment of osteomyelitis. Discussed that patient can progress to sepsis, septic shock, have limb loss, and death sooner due to not treating active infection, patient's wife verbalized understanding stating "he has had this toe wound for so long and was likely infected the whole time, but we don't want any antibiotics". She does not want podiatry consult either.    She would like patient to  be comfortable, biggest concern is getting more of the fluid off him so he can be comfortable for discharge, but would not want anything further after discharge from the hospital. [facility] with heparin at this time to prevent stroke while inpatient. Discussed with cardiology at bedside regarding patient's anticoagulation status - would like to be discharged with anticoagulation at this time. Would not want patient having a stroke. Eliquis 2.5 mg bid sent to pharmacy for price check. Will consult CM for hospice assistance for when patient stable for discharge. POLST complete and in patient's chart  See acp note    Thrombocytopenia [facility])  Assessment & Plan  Noted to have thrombocytopenia on labs. No evidence of active or acute bleeding. Starting on heparin gtt due to a fib history. Patient's wife agreeable to [facility] at this time. Will monitor     Abnormal head CT  Assessment & Plan  Patient had a CAT scan done as part of his work-up for encephalopathy. Noted to have High dense focus along the right falx -dural calcification or tiny subdural hemorrhage . Repeat CAT scan showed stability. Discussed with neurosurgery. Denies any trauma   Patient's family agreeable to anticoagulation at this time with heparin    Right foot ulcer ([address])  Assessment & Plan  Has chronic ulcer right foot. Has not seen a podiatrist or undergone any imaging. Follow-up on lower extremity arterial Doppler study as patient does have bilateral lower extremity edema and diminished pulses. No DVT on Doppler  XR right foot: Erosive changes of the head of the second proximal phalanx and base of the fifth middle phalanx appearing in the area of patient's wound consistent with septic arthropathy and osteomyelitis. Arterial duplex and MRI initially ordered, d/c as patient's wife, daughter, and son would not want any treatment of infection of the foot with IV antibiotics or amputation.    Discussed with patient's spouse, son, and daughter - they would NOT want patient on antibiotics and would not want any amputation. They are planning on doing hospice on discharge. Family does not want evaluation by podiatry at this time as it will not change their decision in being interested in comfort for patient. Only wanting to continue with lasix gtt at this time. Discussed that if patient does have infection of the bone, it is important to start IV antibiotics to prevent sepsis, septic shock, loss of limb, death - patient's wife, daughter, and son verbalized understanding of not starting antibiotics and all agreeable to not start at this time. 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 However he has been off all his medications since February 2023. Follow-up on hemoglobin A1c and start sliding scale insulin. Bladder tumor  Assessment & Plan  Bladder tumor s/pTURBT in 2020. Has not subsequently followed up with urologist.  Found to have urinary retention. Brian catheter placed. Urinalysis with microscopy negative for hematuria. Family would like to rediscuss brian catheter closer to discharge if they would like him leaving with this or removal prior to discharge. Stage 3 chronic kidney disease [facility])  Assessment & Plan  Lab Results   Component Value Date    EGFR 41 10/25/2023    EGFR 39 10/24/2023    EGFR 38 10/24/2023    CREATININE 1.52 (H) 10/25/2023    CREATININE 1.58 (H) 10/24/2023    CREATININE 1.60 (H) 10/24/2023   Likely secondary to diabetic nephropathy. Baseline creatinine 1.6. Creatinine 1.88 on the day of admission. Patient with urinary retention.   Status post Melchor catheter placed  Monitor BID BMP with lasix gtt. Acute metabolic encephalopathy  Assessment & Plan  Wife reports worsening confusion over the last several weeks  No asterixis noted. Neuro exam nonfocal  Likely multifactorial in the setting of hypoxia related to heart failure. However in view of new onset atrial fibrillation cannot rule out acute CVA or hyperammonemia in the setting of cirrhosis seen on imaging. Continue with serial neurochecks  CT head reviewed. Questionable small tiny subdural versus dural fold on the CT. Discussed with neurosurgery. Stable for anticoagulation since the repeat CAT scan is stable. Alcoholic cirrhosis of liver with ascites [facility])  Assessment & Plan  Seen on CT scan of the abdomen. Associated with large ascites. Will need paracentesis. 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 Follow-up on abdominal ultrasound: Abnormal liver compatible with hepatic cirrhosis. No evidence of liver mass. Moderate ascites. Bilateral renal atrophy. No hydronephrosis or perinephric collection. Cholelithiasis without evidence of acute cholecystitis. Wife reported some confusion. Follow-up on ammonia-ammonia and chronic hepatitis panel is negative               VTE Pharmacologic Prophylaxis: VTE Score: 5 High Risk (Score >/= 5) - Pharmacological DVT Prophylaxis Ordered: apixaban (Eliquis). Sequential Compression Devices Ordered. Patient Centered Rounds: I performed bedside rounds with nursing staff today. Discussions with Specialists or Other Care Team Provider: Nursing    Education and Discussions with Family / Patient: Updated  (wife) via phone. Total Time Spent on Date of Encounter in care of patient:   This time was spent on one or more of the following: performing physical exam; counseling and coordination of care; obtaining or reviewing history; documenting in the medical record; reviewing/ordering tests, medications or procedures; communicating with other healthcare professionals and discussing with patient's family/caregivers. Current Length of Stay: 5 day(s)  Current Patient Status: Inpatient   Certification Statement: The patient will continue to require additional inpatient hospital stay due to acute on chronic heart failure requiring lasix gtt  Discharge Plan: Anticipate discharge in 24-48 hrs to home with home services. Code Status: Level 3 - DNAR and DNI    Subjective:   Patient states that he is in pain today in his legs and hips. States that pain is chronic but that it is worse today than usual. Denies chest pain or shortness of breath. Does not offer any further complaints at this time. Objective:     Vitals:   Temp (24hrs), Av.7 °F (36.5 °C), Min:97.5 °F (36.4 °C), Max:98.1 °F (36.7 °C)    Temp:  [97.5 °F (36.4 °C)-98.1 °F (36.7 °C)] 97.7 °F (36.5 °C)  HR:  [79-98] 98  Resp:  [12-18] 12  BP: (118-131)/(76-87) 131/87  SpO2:  [84 %-97 %] 84 %  Body mass index is 24.47 kg/m². Input and Output Summary (last 24 hours): Intake/Output Summary (Last 24 hours) at 10/25/2023 1120  Last data filed at 10/25/2023 0708  Gross per 24 hour   Intake 240 ml   Output 4300 ml   Net -4060 ml       Physical Exam:   Physical Exam  Vitals reviewed. Constitutional:       General: He is not in acute distress. Appearance: Normal appearance. He is ill-appearing. HENT:      Head: Normocephalic and atraumatic. Nose: Nose normal.      Mouth/Throat:      Mouth: Mucous membranes are moist.      Pharynx: Oropharynx is clear. Eyes:      Extraocular Movements: Extraocular movements intact. Conjunctiva/sclera: Conjunctivae normal.   Cardiovascular:      Rate and Rhythm: Normal rate. Rhythm irregular. Pulses: Normal pulses. Heart sounds: Normal heart sounds. No murmur heard. Pulmonary:      Effort: Pulmonary effort is normal. No respiratory distress. Breath sounds: Normal breath sounds.  No wheezing. Abdominal:      General: Abdomen is flat. Bowel sounds are normal. There is no distension. Palpations: Abdomen is soft. Tenderness: There is no abdominal tenderness. There is no guarding. Musculoskeletal:         General: Normal range of motion. Cervical back: Normal range of motion. Right lower leg: Edema present. Left lower leg: Edema present. Skin:     General: Skin is warm. Findings: Lesion present. Comments: Lesion on second digit right foot   Neurological:      General: No focal deficit present. Mental Status: He is alert and oriented to person, place, and time. Mental status is at baseline. Motor: No weakness. Psychiatric:         Mood and Affect: Mood normal.         Behavior: Behavior normal.         Thought Content: Thought content normal.         Judgment: Judgment normal.          Additional Data:     Labs:  Results from last 7 days   Lab Units 10/25/23  0558 10/22/23  0518 10/21/23  0508 10/20/23  1326 10/20/23  0923   WBC Thousand/uL 6.53   < > 9.07   < > 9.02   HEMOGLOBIN g/dL 14.5   < > 14.6   < > 17.1*   HEMATOCRIT % 46.2   < > 46.6   < > 55.7*   PLATELETS Thousands/uL 110*   < > 91*   < > 141*   NEUTROS PCT %  --   --   --   --  77*   LYMPHS PCT %  --   --   --   --  13*   LYMPHO PCT %  --   --  10*  --   --    MONOS PCT %  --   --   --   --  7   MONO PCT %  --   --  7  --   --    EOS PCT %  --   --  1  --  2    < > = values in this interval not displayed.      Results from last 7 days   Lab Units 10/25/23  0558 10/22/23  2113 10/22/23  0518   SODIUM mmol/L 143   < > 140   POTASSIUM mmol/L 3.7   < > 3.7   CHLORIDE mmol/L 98   < > 105   CO2 mmol/L 37*   < > 27   BUN mg/dL 33*   < > 41*   CREATININE mg/dL 1.52*   < > 1.71*   ANION GAP mmol/L 8   < > 8   CALCIUM mg/dL 8.9   < > 8.3*   ALBUMIN g/dL  --   --  2.9*   TOTAL BILIRUBIN mg/dL  --   --  1.01*   ALK PHOS U/L  --   --  78   ALT U/L  --   --  10   AST U/L  --   --  20   GLUCOSE RANDOM mg/dL 148*   < > 106    < > = values in this interval not displayed. Results from last 7 days   Lab Units 10/22/23  1512   INR  1.28*     Results from last 7 days   Lab Units 10/25/23  0726 10/24/23  2055 10/24/23  1558 10/24/23  1114 10/24/23  0753 10/23/23  2103 10/23/23  1602 10/23/23  1053 10/23/23  0709 10/22/23  2102 10/22/23  1620 10/22/23  1117   POC GLUCOSE mg/dl 137 205* 146* 172* 112 273* 147* 201* 140 146* 206* 177*     Results from last 7 days   Lab Units 10/20/23  1342   HEMOGLOBIN A1C % 7.5*     Results from last 7 days   Lab Units 10/21/23  0508 10/20/23  1158 10/20/23  0923   LACTIC ACID mmol/L  --  2.2* 4.0*   PROCALCITONIN ng/ml 0.32*  --  0.21       Lines/Drains:  Invasive Devices       Peripheral Intravenous Line  Duration             Peripheral IV 10/25/23 Dorsal (posterior); Right Hand <1 day              Drain  Duration             Urethral Catheter 18 Fr. 4 days                  Urinary Catheter:  Goal for removal: N/A- Discharging with Melchor           Telemetry:  Telemetry Orders (From admission, onward)               24 Hour Telemetry Monitoring  Continuous x 24 Hours (Telem)        Question:  Reason for 24 Hour Telemetry  Answer:  Arrhythmias requiring acute medical intervention / PPM or ICD malfunction                     Telemetry Reviewed: Atrial fibrillation. HR averaging 90  Indication for Continued Telemetry Use: Acute CHF on >200 mg lasix/day or equivalent dose or with new reduced EF. Imaging: Reviewed radiology reports from this admission including: chest xray    Recent Cultures (last 7 days):   Results from last 7 days   Lab Units 10/20/23  0930 10/20/23  0923   BLOOD CULTURE  No Growth After 4 Days. No Growth After 4 Days.        Last 24 Hours Medication List:   Current Facility-Administered Medications   Medication Dose Route Frequency Provider Last Rate    acetaminophen  650 mg Oral Q6H PRN Emily Cho MD      apixaban  2.5 mg Oral BID Fredy Vasquez MANOJ      furosemide  10 mg/hr Intravenous Continuous John Ovalle PA-C 10 mg/hr (10/25/23 0603)    hydrocortisone   Topical BID Janiece Mcburney Gnall, PA-C      insulin lispro  1-5 Units Subcutaneous HS Monie Britton MD      insulin lispro  1-6 Units Subcutaneous TID AC Monie Britton MD      lidocaine  1 patch Topical Daily Janiece Mcburney Gnall, PA-C      metoprolol  2.5 mg Intravenous Q6H PRN Abelardo Monse Britton MD      metoprolol succinate  25 mg Oral BID KRISH Garcia      oxyCODONE  2.5 mg Oral Q6H PRN John Ovalle PA-C      oxyCODONE  5 mg Oral Q6H PRN Janiece Mcburney Gnall, PA-C      potassium chloride  20 mEq Oral BID KRISH Garcia          Today, Patient Was Seen By: Daniel Navarrete PA-C    **Please Note: This note may have been constructed using a voice recognition system. **

## 2023-10-25 NOTE — ASSESSMENT & PLAN NOTE
Patient's spouse Sánchez Duenas, daughter, and son present at bedside and discussing goals of care with patient. She states that he would not want to be living like this and would like patient to go home on hospice when as stable as possible. Would like to try and get the fluid off him and then have him come back home. Does not want any extensive measures taking or any procedures. Does not want IV antibiotics or treatment of osteomyelitis. Discussed that patient can progress to sepsis, septic shock, have limb loss, and death sooner due to not treating active infection, patient's wife verbalized understanding stating "he has had this toe wound for so long and was likely infected the whole time, but we don't want any antibiotics". She does not want podiatry consult either. She would like patient to  be comfortable, biggest concern is getting more of the fluid off him so he can be comfortable for discharge, but would not want anything further after discharge from the hospital. [facility] with heparin at this time to prevent stroke while inpatient. Discussed with cardiology at bedside regarding patient's anticoagulation status - would like to be discharged with anticoagulation at this time. Would not want patient having a stroke. Eliquis 2.5 mg bid sent to pharmacy for price check. Will consult CM for hospice assistance for when patient stable for discharge.    POLST complete and in patient's chart  See acp note

## 2023-10-25 NOTE — ASSESSMENT & PLAN NOTE
Has chronic ulcer right foot. Has not seen a podiatrist or undergone any imaging. Follow-up on lower extremity arterial Doppler study as patient does have bilateral lower extremity edema and diminished pulses. No DVT on Doppler  XR right foot: Erosive changes of the head of the second proximal phalanx and base of the fifth middle phalanx appearing in the area of patient's wound consistent with septic arthropathy and osteomyelitis. Arterial duplex and MRI initially ordered, d/c as patient's wife, daughter, and son would not want any treatment of infection of the foot with IV antibiotics or amputation. Discussed with patient's spouse, son, and daughter - they would NOT want patient on antibiotics and would not want any amputation. They are planning on doing hospice on discharge. Family does not want evaluation by podiatry at this time as it will not change their decision in being interested in comfort for patient. Only wanting to continue with lasix gtt at this time. Discussed that if patient does have infection of the bone, it is important to start IV antibiotics to prevent sepsis, septic shock, loss of limb, death - patient's wife, daughter, and son verbalized understanding of not starting antibiotics and all agreeable to not start at this time.

## 2023-10-25 NOTE — PROGRESS NOTES
Progress Note:Cardiology  Edgar Avendano 1940, 78 y.o. male MRN: [id number]    Unit/Bed#: -01 Encounter: [id number]  Attending Physician: Jose Ring MD   Primary Care Provider: No primary care provider on file. Date admitted to hospital: 10/20/2023  Length of stay: 5     Assessment/Plan:     Acute hypoxic respiratory failure:              Resolved with diuresis     2. Acute on chronic HFrEF:  Echocardiogram 10/20/2023 shows LVEF 25-29%, RV dilated with reduced function, bi-atrial dilation, severe MR, mod-severe TR with PASP 65mmHg. Started on furosemide drip at 10mg/hr on 10/22/23 with excellent response. -2.5 L in 24 hours. Will continue Lasix drip today. Recommend twice daily BMP. Strict I's/O's, standing daily weights as safe. Sodium and fluid restriction. Optimize electrolytes for K+ >4, Mag >2. See discussion under cardiomyopathy. 3. Dilated cardiomyopathy:  Echocardiogram 10/20/2023 with EF 25-29%, RV dilation with reduced function, severe MR, mod-severe TR with PASP 65mmHg. Previous echocardiogram in 2020 showed EF 58%. 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 Prognosis is poor and he is seriously considering hospice services. If he opts against hospice he should have eventual outpatient ischemic work up. Given his history of medication non-adherence and co-morbidities he is not a candidate for advanced therapies at this time. Will titrate GDMT as able. Transitioned from metoprolol tartrate to metoprolol succinate 25 mg BID on the evening of 10/23/2023. Will add ARB, such as losartan as BP allows. Continue to diurese - see #2. 4. Severe mitral regurgitation:  Suspect functional. If he opts against hospice this should be re-evaluated when optimized on GDMT     5. Persistent atrial fibrillation with RVR:  History of permanent atrial fibrillation, previously on warfarin, currently on a heparin drip. HR's were uncontrolled at admission.  Heart rates have improved with addition of metoprolol tartrate 25 mg BID. HR's remain acceptable with transition to metoprolol succinate 25 mg BID. Can titrate dose as needed. Started on Eliquis 2.5 mg BID. Continue telemetry with aggressive diuresis. Optimize electrolytes for K+ >4, Mag >2.     6. CAD with PCI to LAD, Lcx, RCA in 2005 at [facility], history of NSTEMI in 2011 with PCI and BMS to OM:  Will defer aspirin in lieu of full AC for stroke prevention in a fib. Not on statin with history of liver cirrhosis. Continue beta blocker - now  on metoprolol succinate 25 mg BID. 7. Remote ETOH abuse, in remission. 8. Bilateral pleural effusions:              See discussion under #1 and #2. No indication for thoracentesis at this time. 9. Liver cirrhosis     10. Recent non-adherence to medical therapy      Subjective:   Patient seen and examined. No significant events overnight. He is feeling very well. Breathing continues to improve. He is urinating vigorously with the Lasix drip. He continues to report some cough and shortness of breath at times. No chest pain, lightheadedness, palpitations. No events on telemetry overnight. Review of Systems   Constitutional: Negative. HENT: Negative. Cardiovascular:  Positive for dyspnea on exertion and leg swelling. Negative for chest pain, irregular heartbeat, near-syncope, orthopnea and palpitations. Respiratory:  Positive for cough and shortness of breath. Negative for snoring. Endocrine: Negative. Skin: Negative. Musculoskeletal: Negative. Gastrointestinal: Negative. Genitourinary: Negative. Neurological: Negative. Psychiatric/Behavioral: Negative. Objective:     Vitals: Blood pressure 124/82, pulse 97, temperature 97.5 °F (36.4 °C), resp. rate 13, height 6' 3" (1.905 m), weight 88.8 kg (195 lb 12.3 oz), SpO2 96 %. , Body mass index is 24.47 kg/m².,     Orthostatic Blood Pressures      Flowsheet Row Most Recent Value   Blood Pressure 124/82 filed at 10/25/2023 1222   Patient Position - Orthostatic VS Lying filed at 10/23/2023 2830            Physical Exam  Vitals and nursing note reviewed. Constitutional:       General: He is not in acute distress. Appearance: He is well-developed. Comments: Appears frail and chronically ill   HENT:      Head: Normocephalic and atraumatic. Eyes:      Conjunctiva/sclera: Conjunctivae normal.   Cardiovascular:      Rate and Rhythm: Normal rate. Rhythm irregular. Heart sounds: No murmur heard. Pulmonary:      Effort: Pulmonary effort is normal. No respiratory distress. Breath sounds: Wheezing present. Comments: Breathing comfortably on room air  Abdominal:      Palpations: Abdomen is soft. Tenderness: There is no abdominal tenderness. Musculoskeletal:         General: No swelling. Cervical back: Neck supple. Right lower le+ Edema present. Left lower le+ Edema present. Skin:     General: Skin is warm and dry. Capillary Refill: Capillary refill takes less than 2 seconds. Neurological:      Mental Status: He is alert.    Psychiatric:         Mood and Affect: Mood normal.            Intake/Output Summary (Last 24 hours) at 10/25/2023 0952  Last data filed at 10/25/2023 0708  Gross per 24 hour   Intake 240 ml   Output 4300 ml   Net -4060 ml       Weight (last 2 days)       Date/Time Weight    10/25/23 08:11:20 88.8 (195.77)    10/25/23 0600 90.8 (200.18)    10/24/23 0600 94.2 (207.67)    10/24/23 0500 94.2 (207.67)    10/23/23 0600 101 (222.66)               Medications:      Current Facility-Administered Medications:     acetaminophen (TYLENOL) tablet 650 mg, 650 mg, Oral, Q6H PRN, Chalino Britton MD    apixaban (ELIQUIS) tablet 2.5 mg, 2.5 mg, Oral, BID, Lyssa Aragon PA-C, 2.5 mg at 10/25/23 0930    furosemide (LASIX) 500 mg infusion 50 mL, 10 mg/hr, Intravenous, Continuous, Brian Baldwin PA-C, Last Rate: 1 mL/hr at 10/25/23 0603, 10 mg/hr at 10/25/23 0603    hydrocortisone (ANUSOL-HC) 2.5 % rectal cream, , Topical, BID, AltheaRICHIE NolenC, Given at 10/25/23 0931    insulin lispro (HumaLOG) 100 units/mL subcutaneous injection 1-5 Units, 1-5 Units, Subcutaneous, HS, Monie Britton MD, 1 Units at 10/24/23 2201    insulin lispro (HumaLOG) 100 units/mL subcutaneous injection 1-6 Units, 1-6 Units, Subcutaneous, TID AC, 1 Units at 10/24/23 1114 **AND** Fingerstick Glucose (POCT), , , TID AC, Monie Britton MD    lidocaine (LIDODERM) 5 % patch 1 patch, 1 patch, Topical, Daily, Althea GelMANOJ bernal, 1 patch at 10/25/23 0931    metoprolol (LOPRESSOR) injection 2.5 mg, 2.5 mg, Intravenous, Q6H PRN, Jay Snellen Mansoor, MD    metoprolol succinate (TOPROL-XL) 24 hr tablet 25 mg, 25 mg, Oral, BID, Beth Arciniega, CRNP, 25 mg at 10/25/23 0931    oxyCODONE (ROXICODONE) IR tablet 2.5 mg, 2.5 mg, Oral, Q6H PRN, Jose Francisco Aragon PA-C    oxyCODONE (ROXICODONE) IR tablet 5 mg, 5 mg, Oral, Q6H PRN, Jose Francisco Aragon PA-C, 5 mg at 10/25/23 0911    potassium chloride (K-DUR,KLOR-CON) CR tablet 20 mEq, 20 mEq, Oral, BID, Jovanna Roddy, CRNP, 20 mEq at 10/25/23 0931     Labs & Results:        Results from last 7 days   Lab Units 10/25/23  0558 10/24/23  0507 10/23/23  1156   WBC Thousand/uL 6.53 6.28 6.98   HEMOGLOBIN g/dL 14.5 14.1 15.7   HEMATOCRIT % 46.2 43.4 50.0*   PLATELETS Thousands/uL 110* 113* 116*     Results from last 7 days   Lab Units 10/21/23  0508   TRIGLYCERIDES mg/dL 144   HDL mg/dL 33*     Results from last 7 days   Lab Units 10/25/23  0558 10/24/23  1353 10/24/23  0507 10/22/23  2113 10/22/23  0518 10/21/23  0508 10/20/23  0923   POTASSIUM mmol/L 3.7 3.5 3.2*   < > 3.7 4.5 5.2   CHLORIDE mmol/L 98 98 100   < > 105 106 102   CO2 mmol/L 37* 35* 32   < > 27 23 25   BUN mg/dL 33* 34* 36*   < > 41* 44* 44*   CREATININE mg/dL 1.52* 1.58* 1.60*   < > 1.71* 1.77* 1.88*   CALCIUM mg/dL 8.9 8.6 8.5   < > 8.3* 8.6 9.2   ALK PHOS U/L  --   --   --   -- 78 82 105*   ALT U/L  --   --   --   --  10 11 15   AST U/L  --   --   --   --  20 21 26    < > = values in this interval not displayed. Results from last 7 days   Lab Units 10/24/23  0507 10/24/23  0014 10/23/23  1759 10/22/23  2113 10/22/23  1512 10/21/23  0626 10/20/23  1342   INR   --   --   --   --  1.28* 1.30* 1.24*   PTT seconds 42* 61* 48*   < > 29  --  26    < > = values in this interval not displayed. Results from last 7 days   Lab Units 10/25/23  0558 10/24/23  0507 10/23/23  0343   MAGNESIUM mg/dL 2.0 1.7* 1.9     Results from last 7 days   Lab Units 10/20/23  0923   BNP pg/mL 3,855*      Telemetry: atrial fibrillation. Rate 80-90's    Counseling / Coordination of Care  Total floor / unit time spent today 25 minutes. Greater than 50% of total time was spent with the patient and / or family counseling and / or coordination of care. A description of the counseling / coordination of care: Discussed case with Dr. Latonia Villa.

## 2023-10-26 LAB
ANION GAP SERPL CALCULATED.3IONS-SCNC: 7 MMOL/L
BUN SERPL-MCNC: 30 MG/DL (ref 5–25)
CALCIUM SERPL-MCNC: 9 MG/DL (ref 8.4–10.2)
CHLORIDE SERPL-SCNC: 96 MMOL/L (ref 96–108)
CO2 SERPL-SCNC: 39 MMOL/L (ref 21–32)
CREAT SERPL-MCNC: 1.43 MG/DL (ref 0.6–1.3)
GFR SERPL CREATININE-BSD FRML MDRD: 44 ML/MIN/1.73SQ M
GLUCOSE SERPL-MCNC: 116 MG/DL (ref 65–140)
GLUCOSE SERPL-MCNC: 123 MG/DL (ref 65–140)
GLUCOSE SERPL-MCNC: 181 MG/DL (ref 65–140)
GLUCOSE SERPL-MCNC: 199 MG/DL (ref 65–140)
GLUCOSE SERPL-MCNC: 322 MG/DL (ref 65–140)
MAGNESIUM SERPL-MCNC: 1.8 MG/DL (ref 1.9–2.7)
POTASSIUM SERPL-SCNC: 3.4 MMOL/L (ref 3.5–5.3)
SODIUM SERPL-SCNC: 142 MMOL/L (ref 135–147)

## 2023-10-26 PROCEDURE — 99232 SBSQ HOSP IP/OBS MODERATE 35: CPT

## 2023-10-26 PROCEDURE — 83735 ASSAY OF MAGNESIUM: CPT

## 2023-10-26 PROCEDURE — 80048 BASIC METABOLIC PNL TOTAL CA: CPT

## 2023-10-26 PROCEDURE — 82948 REAGENT STRIP/BLOOD GLUCOSE: CPT

## 2023-10-26 RX ORDER — POTASSIUM CHLORIDE 20 MEQ/1
20 TABLET, EXTENDED RELEASE ORAL ONCE
Status: COMPLETED | OUTPATIENT
Start: 2023-10-26 | End: 2023-10-26

## 2023-10-26 RX ORDER — MAGNESIUM SULFATE HEPTAHYDRATE 40 MG/ML
2 INJECTION, SOLUTION INTRAVENOUS ONCE
Status: COMPLETED | OUTPATIENT
Start: 2023-10-26 | End: 2023-10-26

## 2023-10-26 RX ADMIN — MAGNESIUM SULFATE HEPTAHYDRATE 2 G: 40 INJECTION, SOLUTION INTRAVENOUS at 10:42

## 2023-10-26 RX ADMIN — HYDROCORTISONE 1 APPLICATION: 25 CREAM TOPICAL at 08:35

## 2023-10-26 RX ADMIN — Medication 10 MG/HR: at 12:22

## 2023-10-26 RX ADMIN — APIXABAN 2.5 MG: 2.5 TABLET, FILM COATED ORAL at 08:34

## 2023-10-26 RX ADMIN — LIDOCAINE 5% 1 PATCH: 700 PATCH TOPICAL at 08:35

## 2023-10-26 RX ADMIN — APIXABAN 2.5 MG: 2.5 TABLET, FILM COATED ORAL at 19:10

## 2023-10-26 RX ADMIN — POTASSIUM CHLORIDE 20 MEQ: 1500 TABLET, EXTENDED RELEASE ORAL at 10:42

## 2023-10-26 RX ADMIN — POTASSIUM CHLORIDE 20 MEQ: 1500 TABLET, EXTENDED RELEASE ORAL at 19:11

## 2023-10-26 RX ADMIN — INSULIN LISPRO 1 UNITS: 100 INJECTION, SOLUTION INTRAVENOUS; SUBCUTANEOUS at 11:04

## 2023-10-26 RX ADMIN — INSULIN LISPRO 2 UNITS: 100 INJECTION, SOLUTION INTRAVENOUS; SUBCUTANEOUS at 16:36

## 2023-10-26 RX ADMIN — POTASSIUM CHLORIDE 20 MEQ: 1500 TABLET, EXTENDED RELEASE ORAL at 08:34

## 2023-10-26 RX ADMIN — INSULIN LISPRO 3 UNITS: 100 INJECTION, SOLUTION INTRAVENOUS; SUBCUTANEOUS at 22:02

## 2023-10-26 RX ADMIN — HYDROCORTISONE: 25 CREAM TOPICAL at 16:37

## 2023-10-26 RX ADMIN — METOPROLOL SUCCINATE 25 MG: 25 TABLET, EXTENDED RELEASE ORAL at 08:34

## 2023-10-26 NOTE — PROGRESS NOTES
[address] [facility]  Progress Note  Name: Radha Alvarez  MRN: [id number]  Unit/Bed#: -42 I Date of Admission: 10/20/2023   Date of Service: 10/26/2023 I Hospital Day: 6    Assessment/Plan   * Acute combined systolic and diastolic congestive heart failure (HCC)  Assessment & Plan  Wt Readings from Last 3 Encounters:   10/26/23 86 kg (189 lb 9.5 oz)     Patient presents with worsening shortness of breath abdominal distention and lower extremity edema. Has no known prior history of congestive heart failure however has however has not seen a physician in 3 years and had stopped taking all his medications since February 2023  CT scan of the chest shows bilateral large pleural effusion, cardiomegaly, large ascites  Wife at bedside reports increased weight gain  Patient hypoxic requiring 3 L of supplemental oxygen via nasal cannula - has been weaned to room air  Will initiate treatment with IV furosemide 40 mg twice daily. -Patient is net -3 L today morning   Monitor daily weight and intake and output. Continue with current diuresis  Echo: Ejection fraction is 25 -29% with diffuse global hypokinesis. Noted to have moderate to severe TR and severe MR. Patient's wife reported that she talk to cardiology yesterday and cardiology gave him a very poor prognosis. -Patient is not a surgical candidate. Wife is considering hospice-we will consult hospice through case management  Cardiology: continue lasix gtt 10 mg/hr and BMP BID  Unfortunately, lasix gtt was not started initially, started on 10/22 - assess response. Atrial fibrillation with RVR ([address])  Assessment & Plan  Present admission with heart rate in the 120s. Received 1 dose of IV Cardizem in the emergency room. Subsequently heart rate varying between 100-1 10. Continue with telemetry monitoring  Initiate Lopressor 25mg twice daily  [id number] score 4  Discussed with wife about anticoagulation-plan was for anticoagulation.    CT head is concerning for tiny subdural hemorrhage. Anticoagulation was on hold. But on repeat imaging it remained stable. Case was discussed with neurosurgery physician assistant. Cleared for anticoagulation if medically necessary. Discussed with the wife. Wife reported that she would like to hold off on anticoagulation. She reported that she talked to cardiology and cardiology gave her very poor prognosis for him and he is not a surgical candidate. He understand his prognosis and would like to hold anticoagulation for now. Risk and benefit of anticoagulation versus known anticoagulation was explained to the wife- wife, daughter, and son are ok with heparin while inpatient, initially were not interested in [facility] after discharge, but family thought more on this and would like to do eliquis on discharge for prevention of stroke. They understand the risk of bleeding associated and would like to proceed. Eliquis 2.5mg bid sent to pharmacy for price check - $10, patient's wife agreeable to this cost  Heparin gtt d/c and eliquis 2.5 mg bid started. Goals of care, counseling/discussion  Assessment & Plan  Patient's spouse Tatum Mac, daughter, and son present at bedside and discussing goals of care with patient. She states that he would not want to be living like this and would like patient to go home on hospice when as stable as possible. Would like to try and get the fluid off him and then have him come back home. Does not want any extensive measures taking or any procedures. Does not want IV antibiotics or treatment of osteomyelitis. Discussed that patient can progress to sepsis, septic shock, have limb loss, and death sooner due to not treating active infection, patient's wife verbalized understanding stating "he has had this toe wound for so long and was likely infected the whole time, but we don't want any antibiotics". She does not want podiatry consult either.    She would like patient to  be comfortable, biggest concern is getting more of the fluid off him so he can be comfortable for discharge, but would not want anything further after discharge from the hospital. [facility] with heparin at this time to prevent stroke while inpatient. Discussed with cardiology at bedside regarding patient's anticoagulation status - would like to be discharged with anticoagulation at this time. Would not want patient having a stroke. Eliquis 2.5 mg bid sent to pharmacy for price check. Will consult CM for hospice assistance for when patient stable for discharge. POLST complete and in patient's chart  See acp note    Thrombocytopenia [facility])  Assessment & Plan  Noted to have thrombocytopenia on labs. No evidence of active or acute bleeding. Starting on heparin gtt due to a fib history. Patient's wife agreeable to [facility] at this time. Will monitor     Abnormal head CT  Assessment & Plan  Patient had a CAT scan done as part of his work-up for encephalopathy. Noted to have High dense focus along the right falx -dural calcification or tiny subdural hemorrhage . Repeat CAT scan showed stability. Discussed with neurosurgery. Denies any trauma   Patient's family agreeable to anticoagulation at this time with heparin    Right foot ulcer ([address])  Assessment & Plan  Has chronic ulcer right foot. Has not seen a podiatrist or undergone any imaging. Follow-up on lower extremity arterial Doppler study as patient does have bilateral lower extremity edema and diminished pulses. No DVT on Doppler  XR right foot: Erosive changes of the head of the second proximal phalanx and base of the fifth middle phalanx appearing in the area of patient's wound consistent with septic arthropathy and osteomyelitis. Arterial duplex and MRI initially ordered, d/c as patient's wife, daughter, and son would not want any treatment of infection of the foot with IV antibiotics or amputation.    Discussed with patient's spouse, son, and daughter - they would NOT want patient on antibiotics and would not want any amputation. They are planning on doing hospice on discharge. Family does not want evaluation by podiatry at this time as it will not change their decision in being interested in comfort for patient. Only wanting to continue with lasix gtt at this time. Discussed that if patient does have infection of the bone, it is important to start IV antibiotics to prevent sepsis, septic shock, loss of limb, death - patient's wife, daughter, and son verbalized understanding of not starting antibiotics and all agreeable to not start at this time. 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 However he has been off all his medications since February 2023. Follow-up on hemoglobin A1c and start sliding scale insulin. Bladder tumor  Assessment & Plan  Bladder tumor s/pTURBT in 2020. Has not subsequently followed up with urologist.  Found to have urinary retention. Brian catheter placed. Urinalysis with microscopy negative for hematuria. Family would like to rediscuss brian catheter closer to discharge if they would like him leaving with this or removal prior to discharge. Stage 3 chronic kidney disease [facility])  Assessment & Plan  Lab Results   Component Value Date    EGFR 44 10/26/2023    EGFR 41 10/25/2023    EGFR 41 10/25/2023    CREATININE 1.43 (H) 10/26/2023    CREATININE 1.51 (H) 10/25/2023    CREATININE 1.52 (H) 10/25/2023   Likely secondary to diabetic nephropathy. Baseline creatinine 1.6. Creatinine 1.88 on the day of admission. Patient with urinary retention.   Status post Brian catheter placed  Monitor BID BMP with lasix gtt. Acute metabolic encephalopathy  Assessment & Plan  Wife reports worsening confusion over the last several weeks  No asterixis noted. Neuro exam nonfocal  Likely multifactorial in the setting of hypoxia related to heart failure. However in view of new onset atrial fibrillation cannot rule out acute CVA or hyperammonemia in the setting of cirrhosis seen on imaging. Continue with serial neurochecks  CT head reviewed. Questionable small tiny subdural versus dural fold on the CT. Discussed with neurosurgery. Stable for anticoagulation since the repeat CAT scan is stable. Alcoholic cirrhosis of liver with ascites [facility])  Assessment & Plan  Seen on CT scan of the abdomen. Associated with large ascites. Will need paracentesis. 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 Follow-up on abdominal ultrasound: Abnormal liver compatible with hepatic cirrhosis. No evidence of liver mass. Moderate ascites. Bilateral renal atrophy. No hydronephrosis or perinephric collection. Cholelithiasis without evidence of acute cholecystitis. Wife reported some confusion. Follow-up on ammonia-ammonia and chronic hepatitis panel is negative               VTE Pharmacologic Prophylaxis: VTE Score: 5 High Risk (Score >/= 5) - Pharmacological DVT Prophylaxis Ordered: apixaban (Eliquis). Sequential Compression Devices Ordered. Patient Centered Rounds: I performed bedside rounds with nursing staff today. Discussions with Specialists or Other Care Team Provider: Cardiology, nursing and CM    Education and Discussions with Family / Patient: Updated  (wife) via phone. Total Time Spent on Date of Encounter in care of patient:   This time was spent on one or more of the following: performing physical exam; counseling and coordination of care; obtaining or reviewing history; documenting in the medical record; reviewing/ordering tests, medications or procedures; communicating with other healthcare professionals and discussing with patient's family/caregivers. Current Length of Stay: 6 day(s)  Current Patient Status: Inpatient   Certification Statement: The patient will continue to require additional inpatient hospital stay due to requiring Lasix drip for congestive heart failure acute exacerbation  Discharge Plan: Anticipate discharge in 24-48 hrs to home with home services. Code Status: Level 3 - DNAR and DNI    Subjective:   Patient states that he is feeling good today. States that his pain is improved. Denies chest pain or shortness of breath. Does not offer any complaints at this time. Objective:     Vitals:   Temp (24hrs), Av.6 °F (36.4 °C), Min:97.3 °F (36.3 °C), Max:97.9 °F (36.6 °C)    Temp:  [97.3 °F (36.3 °C)-97.9 °F (36.6 °C)] 97.9 °F (36.6 °C)  HR:  [78-97] 78  Resp:  [17-18] 18  BP: (115-119)/(74-77) 119/77  SpO2:  [93 %-98 %] 94 %  Body mass index is 23.7 kg/m². Input and Output Summary (last 24 hours): Intake/Output Summary (Last 24 hours) at 10/26/2023 1240  Last data filed at 10/26/2023 0720  Gross per 24 hour   Intake --   Output 5000 ml   Net -5000 ml       Physical Exam:   Physical Exam  Vitals reviewed. Constitutional:       General: He is not in acute distress. Appearance: Normal appearance. He is not ill-appearing. HENT:      Head: Normocephalic and atraumatic. Nose: Nose normal.      Mouth/Throat:      Mouth: Mucous membranes are moist.      Pharynx: Oropharynx is clear. Eyes:      Extraocular Movements: Extraocular movements intact. Conjunctiva/sclera: Conjunctivae normal.   Cardiovascular:      Rate and Rhythm: Normal rate. Rhythm irregular. Pulses: Normal pulses. Heart sounds: Murmur heard. Pulmonary:      Effort: Pulmonary effort is normal. No respiratory distress. Breath sounds: Normal breath sounds. No wheezing. Abdominal:      General: Abdomen is flat. Bowel sounds are normal. There is no distension. Palpations: Abdomen is soft. Tenderness: There is no abdominal tenderness. There is no guarding. Musculoskeletal:         General: Normal range of motion. Cervical back: Normal range of motion. Right lower leg: No edema. Left lower leg: No edema. Skin:     General: Skin is warm. Neurological:      General: No focal deficit present. Mental Status: He is alert and oriented to person, place, and time. Mental status is at baseline. Motor: No weakness. Psychiatric:         Mood and Affect: Mood normal.         Behavior: Behavior normal.         Thought Content: Thought content normal.         Judgment: Judgment normal.          Additional Data:     Labs:  Results from last 7 days   Lab Units 10/25/23  0558 10/22/23  0518 10/21/23  0508 10/20/23  1326 10/20/23  0923   WBC Thousand/uL 6.53   < > 9.07   < > 9.02   HEMOGLOBIN g/dL 14.5   < > 14.6   < > 17.1*   HEMATOCRIT % 46.2   < > 46.6   < > 55.7*   PLATELETS Thousands/uL 110*   < > 91*   < > 141*   NEUTROS PCT %  --   --   --   --  77*   LYMPHS PCT %  --   --   --   --  13*   LYMPHO PCT %  --   --  10*  --   --    MONOS PCT %  --   --   --   --  7   MONO PCT %  --   --  7  --   --    EOS PCT %  --   --  1  --  2    < > = values in this interval not displayed. Results from last 7 days   Lab Units 10/26/23  0532 10/22/23  2113 10/22/23  0518   SODIUM mmol/L 142   < > 140   POTASSIUM mmol/L 3.4*   < > 3.7   CHLORIDE mmol/L 96   < > 105   CO2 mmol/L 39*   < > 27   BUN mg/dL 30*   < > 41*   CREATININE mg/dL 1.43*   < > 1.71*   ANION GAP mmol/L 7   < > 8   CALCIUM mg/dL 9.0   < > 8.3*   ALBUMIN g/dL  --   --  2.9*   TOTAL BILIRUBIN mg/dL  --   --  1.01*   ALK PHOS U/L  --   --  78   ALT U/L  --   --  10   AST U/L  --   --  20   GLUCOSE RANDOM mg/dL 123   < > 106    < > = values in this interval not displayed.      Results from last 7 days   Lab Units 10/22/23  1512   INR  1.28* Results from last 7 days   Lab Units 10/26/23  1049 10/26/23  0718 10/25/23  2034 10/25/23  1532 10/25/23  1127 10/25/23  0726 10/24/23  2055 10/24/23  1558 10/24/23  1114 10/24/23  0753 10/23/23  2103 10/23/23  1602   POC GLUCOSE mg/dl 181* 116 152* 165* 198* 137 205* 146* 172* 112 273* 147*     Results from last 7 days   Lab Units 10/20/23  1342   HEMOGLOBIN A1C % 7.5*     Results from last 7 days   Lab Units 10/21/23  0508 10/20/23  1158 10/20/23  0923   LACTIC ACID mmol/L  --  2.2* 4.0*   PROCALCITONIN ng/ml 0.32*  --  0.21       Lines/Drains:  Invasive Devices       Peripheral Intravenous Line  Duration             Peripheral IV 10/25/23 Dorsal (posterior); Right Hand 1 day    Peripheral IV 10/26/23 Right Arm <1 day              Drain  Duration             Urethral Catheter 18 Fr. 5 days                  Urinary Catheter:  Goal for removal: N/A- Discharging with Melchor           Telemetry:  Telemetry Orders (From admission, onward)               24 Hour Telemetry Monitoring  Continuous x 24 Hours (Telem)        Question:  Reason for 24 Hour Telemetry  Answer:  Arrhythmias requiring acute medical intervention / PPM or ICD malfunction                     Telemetry Reviewed: Atrial fibrillation. HR averaging 80  Indication for Continued Telemetry Use: Acute CHF on >200 mg lasix/day or equivalent dose or with new reduced EF. Imaging: Reviewed radiology reports from this admission including: chest xray and CT head    Recent Cultures (last 7 days):   Results from last 7 days   Lab Units 10/20/23  0930 10/20/23  0923   BLOOD CULTURE  No Growth After 5 Days. No Growth After 5 Days.        Last 24 Hours Medication List:   Current Facility-Administered Medications   Medication Dose Route Frequency Provider Last Rate    acetaminophen  650 mg Oral Q6H PRN Monie Britton MD      apixaban  2.5 mg Oral BID Mamie Valeriano, PA-C      furosemide  10 mg/hr Intravenous Continuous Mamie Valeriano, PA-C 10 mg/hr (10/26/23 1222)    hydrocortisone   Topical BID Tia Aragon PA-C      insulin lispro  1-5 Units Subcutaneous HS Monie Britton MD      insulin lispro  1-6 Units Subcutaneous TID AC Monie Britton MD      lidocaine  1 patch Topical Daily Irina Aragon PA-C      magnesium sulfate  2 g Intravenous Once Sandrine Urban PA-C 2 g (10/26/23 1042)    metoprolol  2.5 mg Intravenous Q6H PRN Monie Britton MD      metoprolol succinate  25 mg Oral BID KRISH Moser      oxyCODONE  2.5 mg Oral Q6H PRN Hema Swanson PA-C      oxyCODONE  5 mg Oral Q6H PRN Tia Aragon PA-C      potassium chloride  20 mEq Oral BID KRISH Moser          Today, Patient Was Seen By: Miguel Angel Lester PA-C    **Please Note: This note may have been constructed using a voice recognition system. **

## 2023-10-26 NOTE — ASSESSMENT & PLAN NOTE
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 However he has been off all his medications since February 2023. Follow-up on hemoglobin A1c and start sliding scale insulin.

## 2023-10-26 NOTE — ASSESSMENT & PLAN NOTE
Lab Results   Component Value Date    EGFR 44 10/26/2023    EGFR 41 10/25/2023    EGFR 41 10/25/2023    CREATININE 1.43 (H) 10/26/2023    CREATININE 1.51 (H) 10/25/2023    CREATININE 1.52 (H) 10/25/2023   Likely secondary to diabetic nephropathy. Baseline creatinine 1.6. Creatinine 1.88 on the day of admission. Patient with urinary retention. Status post Melchor catheter placed  Monitor BID BMP with lasix gtt.

## 2023-10-26 NOTE — ASSESSMENT & PLAN NOTE
Patient's spouse Julio Silva, daughter, and son present at bedside and discussing goals of care with patient. She states that he would not want to be living like this and would like patient to go home on hospice when as stable as possible. Would like to try and get the fluid off him and then have him come back home. Does not want any extensive measures taking or any procedures. Does not want IV antibiotics or treatment of osteomyelitis. Discussed that patient can progress to sepsis, septic shock, have limb loss, and death sooner due to not treating active infection, patient's wife verbalized understanding stating "he has had this toe wound for so long and was likely infected the whole time, but we don't want any antibiotics". She does not want podiatry consult either. She would like patient to  be comfortable, biggest concern is getting more of the fluid off him so he can be comfortable for discharge, but would not want anything further after discharge from the hospital. [facility] with heparin at this time to prevent stroke while inpatient. Discussed with cardiology at bedside regarding patient's anticoagulation status - would like to be discharged with anticoagulation at this time. Would not want patient having a stroke. Eliquis 2.5 mg bid sent to pharmacy for price check. Will consult CM for hospice assistance for when patient stable for discharge.    POLST complete and in patient's chart  See acp note

## 2023-10-26 NOTE — ASSESSMENT & PLAN NOTE
Noted to have thrombocytopenia on labs. No evidence of active or acute bleeding. Starting on heparin gtt due to a fib history. Patient's wife agreeable to [facility] at this time.   Will monitor

## 2023-10-26 NOTE — ASSESSMENT & PLAN NOTE
Wt Readings from Last 3 Encounters:   10/26/23 86 kg (189 lb 9.5 oz)     Patient presents with worsening shortness of breath abdominal distention and lower extremity edema. Has no known prior history of congestive heart failure however has however has not seen a physician in 3 years and had stopped taking all his medications since February 2023  CT scan of the chest shows bilateral large pleural effusion, cardiomegaly, large ascites  Wife at bedside reports increased weight gain  Patient hypoxic requiring 3 L of supplemental oxygen via nasal cannula - has been weaned to room air  Will initiate treatment with IV furosemide 40 mg twice daily. -Patient is net -3 L today morning   Monitor daily weight and intake and output. Continue with current diuresis  Echo: Ejection fraction is 25 -29% with diffuse global hypokinesis. Noted to have moderate to severe TR and severe MR. Patient's wife reported that she talk to cardiology yesterday and cardiology gave him a very poor prognosis. -Patient is not a surgical candidate. Wife is considering hospice-we will consult hospice through case management  Cardiology: continue lasix gtt 10 mg/hr and BMP BID  Unfortunately, lasix gtt was not started initially, started on 10/22 - assess response.

## 2023-10-26 NOTE — ASSESSMENT & PLAN NOTE
Present admission with heart rate in the 120s. Received 1 dose of IV Cardizem in the emergency room. Subsequently heart rate varying between 100-1 10. Continue with telemetry monitoring  Initiate Lopressor 25mg twice daily  [id number] score 4  Discussed with wife about anticoagulation-plan was for anticoagulation. CT head is concerning for tiny subdural hemorrhage. Anticoagulation was on hold. But on repeat imaging it remained stable. Case was discussed with neurosurgery physician assistant. Cleared for anticoagulation if medically necessary. Discussed with the wife. Wife reported that she would like to hold off on anticoagulation. She reported that she talked to cardiology and cardiology gave her very poor prognosis for him and he is not a surgical candidate. He understand his prognosis and would like to hold anticoagulation for now. Risk and benefit of anticoagulation versus known anticoagulation was explained to the wife- wife, daughter, and son are ok with heparin while inpatient, initially were not interested in [facility] after discharge, but family thought more on this and would like to do eliquis on discharge for prevention of stroke. They understand the risk of bleeding associated and would like to proceed. Eliquis 2.5mg bid sent to pharmacy for price check - $10, patient's wife agreeable to this cost  Heparin gtt d/c and eliquis 2.5 mg bid started.

## 2023-10-26 NOTE — PLAN OF CARE
Problem: CARDIOVASCULAR - ADULT  Goal: Maintains optimal cardiac output and hemodynamic stability  Description: INTERVENTIONS:  - Monitor I/O, vital signs and rhythm  - Monitor for S/S and trends of decreased cardiac output swelling , SOB AHUMADA  - Administer and titrate ordered vasoactive medications to optimize hemodynamic stability  - Assess quality of pulses, skin color and temperature  - Assess for signs of decreased coronary artery perfusion  - Instruct patient to report change in severity of symptoms  Outcome: Progressing

## 2023-10-26 NOTE — PROGRESS NOTES
Cardiology         Progress Note - Cardiology   Kristopher Luna 78 y.o. male MRN: [id number]  Unit/Bed#: -Wally Encounter: [id number]          Assessment/Recommendations/Discussion:     Acute hypoxic respiratory failure  Acute systolic CHF  Severe cardiomyopathy, presumed tachycardia mediated  Severe MR  Large B/L pleural effusions  Hepatic cirrhosis with ascites--?cardiogenic +/- ETOH  Permanent Afib, RVR  CAD, remote PCI  H/O ETOH abuse  Medication noncompliance since 2/2023      Continue Lasix infusion, level of edema significantly improved. We will start low-dose lisinopril 2.5 mg p.o. daily.   We will plan on discontinuing Lasix infusion tomorrow and transitioning to p.o. torsemide  Patient not candidate for intervention of severe mitral regurgitation, continue medical therapy  Replete potassium and magnesium          Subjective: Patient seen and examined, offers no complaints, resting comfortably                Physical Exam:  GEN:  NAD  HEENT:  MMM, NCAT, pink conjunctiva, EOMI, nonicteric sclera  CV:  NO JVD/HJR, irregularly irregular, 2 out of 6 systolic murmur, +H3/Z8, NO PARASTERNAL HEAVE/THRILL, + LE EDEMA, NO HEPATIC SYSTOLIC PULSATION, WARM EXTREMITIES  RESP:  CTAB/L anteriorly  ABD:  SOFT, NT, NO GROSS ORGANOMEGALY        Vitals:   /77   Pulse 81   Temp 97.5 °F (36.4 °C)   Resp 18   Ht 6' 3" (1.905 m)   Wt 86 kg (189 lb 9.5 oz)   SpO2 94%   BMI 23.70 kg/m²   Vitals:    10/26/23 0500 10/26/23 0536   Weight: 86 kg (189 lb 9.5 oz) 86 kg (189 lb 9.5 oz)       Intake/Output Summary (Last 24 hours) at 10/26/2023 0934  Last data filed at 10/26/2023 0720  Gross per 24 hour   Intake 180 ml   Output 5000 ml   Net -4820 ml       TELEMETRY: A-fib  Lab Results:  Results from last 7 days   Lab Units 10/25/23  0558   WBC Thousand/uL 6.53   HEMOGLOBIN g/dL 14.5   HEMATOCRIT % 46.2   PLATELETS Thousands/uL 110*     Results from last 7 days   Lab Units 10/26/23  0532 10/22/23  2113 10/22/23  0518 POTASSIUM mmol/L 3.4*   < > 3.7   CHLORIDE mmol/L 96   < > 105   CO2 mmol/L 39*   < > 27   BUN mg/dL 30*   < > 41*   CREATININE mg/dL 1.43*   < > 1.71*   CALCIUM mg/dL 9.0   < > 8.3*   ALK PHOS U/L  --   --  78   ALT U/L  --   --  10   AST U/L  --   --  20    < > = values in this interval not displayed.      Results from last 7 days   Lab Units 10/26/23  0532   POTASSIUM mmol/L 3.4*   CHLORIDE mmol/L 96   CO2 mmol/L 39*   BUN mg/dL 30*   CREATININE mg/dL 1.43*   CALCIUM mg/dL 9.0           Medications:    Current Facility-Administered Medications:     acetaminophen (TYLENOL) tablet 650 mg, 650 mg, Oral, Q6H PRN, Julia Britton MD    apixaban (ELIQUIS) tablet 2.5 mg, 2.5 mg, Oral, BID, Taco Honeycutt PA-C, 2.5 mg at 10/26/23 0834    furosemide (LASIX) 500 mg infusion 50 mL, 10 mg/hr, Intravenous, Continuous, Taco Honeycutt PA-C, Last Rate: 1 mL/hr at 10/25/23 0603, 10 mg/hr at 10/25/23 0603    hydrocortisone (ANUSOL-HC) 2.5 % rectal cream, , Topical, BID, Taco Honeycutt PA-C, 1 Application at 60/37/08 0835    insulin lispro (HumaLOG) 100 units/mL subcutaneous injection 1-5 Units, 1-5 Units, Subcutaneous, HS, Monie Britton MD, 1 Units at 10/25/23 2131    insulin lispro (HumaLOG) 100 units/mL subcutaneous injection 1-6 Units, 1-6 Units, Subcutaneous, TID AC, 1 Units at 10/25/23 1811 **AND** Fingerstick Glucose (POCT), , , TID ACMonie MD    lidocaine (LIDODERM) 5 % patch 1 patch, 1 patch, Topical, Daily, Taco Honeycutt PA-C, 1 patch at 10/26/23 0835    magnesium sulfate 2 g/50 mL IVPB (premix) 2 g, 2 g, Intravenous, Once, Sandrine Urban PA-C    metoprolol (LOPRESSOR) injection 2.5 mg, 2.5 mg, Intravenous, Q6H PRN, Julia Britton MD    metoprolol succinate (TOPROL-XL) 24 hr tablet 25 mg, 25 mg, Oral, BID, KRISH Toledo, 25 mg at 10/26/23 0834    oxyCODONE (ROXICODONE) IR tablet 2.5 mg, 2.5 mg, Oral, Q6H PRN, Rock Aragon PA-C    oxyCODONE (ROXICODONE) IR tablet 5 mg, 5 mg, Oral, Q6H PRN, Navjot Aragon PA-C, 5 mg at 10/25/23 0911    potassium chloride (K-DUR,KLOR-CON) CR tablet 20 mEq, 20 mEq, Oral, BID, KRISH Pereira, 20 mEq at 10/26/23 0834    potassium chloride (K-DUR,KLOR-CON) CR tablet 20 mEq, 20 mEq, Oral, Once, Sandrine Urban PA-C    This note was completed in part utilizing [employer] Software. Grammatical errors, random word insertions, spelling mistakes, and incomplete sentences may be an occasional consequence of this system secondary to software limitations, ambient noise, and hardware issues. If you have any questions or concerns about the content, text, or information contained within the body of this dictation, please contact the provider for clarification.

## 2023-10-27 VITALS
RESPIRATION RATE: 17 BRPM | SYSTOLIC BLOOD PRESSURE: 101 MMHG | BODY MASS INDEX: 23.57 KG/M2 | TEMPERATURE: 97.7 F | OXYGEN SATURATION: 90 % | WEIGHT: 189.6 LBS | HEART RATE: 103 BPM | HEIGHT: 75 IN | DIASTOLIC BLOOD PRESSURE: 64 MMHG

## 2023-10-27 LAB
ANION GAP SERPL CALCULATED.3IONS-SCNC: 7 MMOL/L
BUN SERPL-MCNC: 29 MG/DL (ref 5–25)
CALCIUM SERPL-MCNC: 8.8 MG/DL (ref 8.4–10.2)
CHLORIDE SERPL-SCNC: 93 MMOL/L (ref 96–108)
CO2 SERPL-SCNC: 41 MMOL/L (ref 21–32)
CREAT SERPL-MCNC: 1.46 MG/DL (ref 0.6–1.3)
GFR SERPL CREATININE-BSD FRML MDRD: 43 ML/MIN/1.73SQ M
GLUCOSE SERPL-MCNC: 106 MG/DL (ref 65–140)
GLUCOSE SERPL-MCNC: 119 MG/DL (ref 65–140)
GLUCOSE SERPL-MCNC: 215 MG/DL (ref 65–140)
MAGNESIUM SERPL-MCNC: 2 MG/DL (ref 1.9–2.7)
POTASSIUM SERPL-SCNC: 3.5 MMOL/L (ref 3.5–5.3)
SODIUM SERPL-SCNC: 141 MMOL/L (ref 135–147)

## 2023-10-27 PROCEDURE — 80048 BASIC METABOLIC PNL TOTAL CA: CPT

## 2023-10-27 PROCEDURE — 82948 REAGENT STRIP/BLOOD GLUCOSE: CPT

## 2023-10-27 PROCEDURE — 83735 ASSAY OF MAGNESIUM: CPT

## 2023-10-27 PROCEDURE — 99239 HOSP IP/OBS DSCHRG MGMT >30: CPT

## 2023-10-27 RX ORDER — TORSEMIDE 20 MG/1
40 TABLET ORAL DAILY
Status: DISCONTINUED | OUTPATIENT
Start: 2023-10-27 | End: 2023-10-27 | Stop reason: HOSPADM

## 2023-10-27 RX ORDER — METOPROLOL SUCCINATE 25 MG/1
25 TABLET, EXTENDED RELEASE ORAL 2 TIMES DAILY
Qty: 60 TABLET | Refills: 0 | Status: SHIPPED | OUTPATIENT
Start: 2023-10-27

## 2023-10-27 RX ORDER — LISINOPRIL 2.5 MG/1
2.5 TABLET ORAL DAILY
Qty: 30 TABLET | Refills: 0 | Status: SHIPPED | OUTPATIENT
Start: 2023-10-27

## 2023-10-27 RX ORDER — LISINOPRIL 2.5 MG/1
2.5 TABLET ORAL DAILY
Status: DISCONTINUED | OUTPATIENT
Start: 2023-10-27 | End: 2023-10-27 | Stop reason: HOSPADM

## 2023-10-27 RX ORDER — TORSEMIDE 20 MG/1
40 TABLET ORAL DAILY
Qty: 60 TABLET | Refills: 0 | Status: SHIPPED | OUTPATIENT
Start: 2023-10-27 | End: 2023-11-26

## 2023-10-27 RX ADMIN — APIXABAN 2.5 MG: 2.5 TABLET, FILM COATED ORAL at 07:59

## 2023-10-27 RX ADMIN — HYDROCORTISONE: 25 CREAM TOPICAL at 07:59

## 2023-10-27 RX ADMIN — POTASSIUM CHLORIDE 20 MEQ: 1500 TABLET, EXTENDED RELEASE ORAL at 08:00

## 2023-10-27 RX ADMIN — INSULIN LISPRO 2 UNITS: 100 INJECTION, SOLUTION INTRAVENOUS; SUBCUTANEOUS at 12:04

## 2023-10-27 RX ADMIN — LIDOCAINE 5% 1 PATCH: 700 PATCH TOPICAL at 08:00

## 2023-10-27 RX ADMIN — METOPROLOL SUCCINATE 25 MG: 25 TABLET, EXTENDED RELEASE ORAL at 08:00

## 2023-10-27 NOTE — ASSESSMENT & PLAN NOTE
Patient's spouse Tacho Ng, daughter, and son present at bedside and discussing goals of care with patient. She states that he would not want to be living like this and would like patient to go home on hospice when as stable as possible. Would like to try and get the fluid off him and then have him come back home. Does not want any extensive measures taking or any procedures. Does not want IV antibiotics or treatment of osteomyelitis. Discussed that patient can progress to sepsis, septic shock, have limb loss, and death sooner due to not treating active infection, patient's wife verbalized understanding stating "he has had this toe wound for so long and was likely infected the whole time, but we don't want any antibiotics". She does not want podiatry consult either. She would like patient to  be comfortable, biggest concern is getting more of the fluid off him so he can be comfortable for discharge, but would not want anything further after discharge from the hospital. [facility] with heparin at this time to prevent stroke while inpatient. Discussed with cardiology at bedside regarding patient's anticoagulation status - would like to be discharged with anticoagulation at this time. Would not want patient having a stroke. Eliquis 2.5 mg bid sent to pharmacy for price check. Will consult CM for hospice assistance for when patient stable for discharge.    Would like to continue brian on discharge   POLST complete and in patient's chart  See acp note

## 2023-10-27 NOTE — PROGRESS NOTES
Cardiology         Progress Note - Cardiology   eLsli Hutson 78 y.o. 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   Feels well, no complaints                Physical Exam:  GEN:  NAD  HEENT:  MMM, NCAT, pink conjunctiva, EOMI, nonicteric sclera  CV:  NO JVD/HJR, irregular rhythm, NO M/R/G, +S1/S2, NO PARASTERNAL HEAVE/THRILL, + LE EDEMA, NO HEPATIC SYSTOLIC PULSATION, WARM EXTREMITIES  RESP:  CTAB/L  ABD:  SOFT, NT, NO GROSS ORGANOMEGALY        Vitals:   /60   Pulse 93   Temp 97.5 °F (36.4 °C)   Resp 18   Ht 6' 3" (1.905 m)   Wt 86 kg (189 lb 9.5 oz)   SpO2 90%   BMI 23.70 kg/m²   Vitals:    10/26/23 0500 10/26/23 0536   Weight: 86 kg (189 lb 9.5 oz) 86 kg (189 lb 9.5 oz)       Intake/Output Summary (Last 24 hours) at 10/27/2023 1000  Last data filed at 10/27/2023 0830  Gross per 24 hour   Intake 644.22 ml   Output 2150 ml   Net -1505.78 ml       TELEMETRY: AF  Lab Results:  Results from last 7 days   Lab Units 10/25/23  0558   WBC Thousand/uL 6.53   HEMOGLOBIN g/dL 14.5   HEMATOCRIT % 46.2   PLATELETS Thousands/uL 110*     Results from last 7 days   Lab Units 10/27/23  0518 10/22/23  2113 10/22/23  0518   POTASSIUM mmol/L 3.5   < > 3.7   CHLORIDE mmol/L 93*   < > 105   CO2 mmol/L 41*   < > 27   BUN mg/dL 29*   < > 41*   CREATININE mg/dL 1.46*   < > 1.71*   CALCIUM mg/dL 8.8   < > 8.3*   ALK PHOS U/L  -- --  78   ALT U/L  --   --  10   AST U/L  --   --  20    < > = values in this interval not displayed. Results from last 7 days   Lab Units 10/27/23  0518   POTASSIUM mmol/L 3.5   CHLORIDE mmol/L 93*   CO2 mmol/L 41*   BUN mg/dL 29*   CREATININE mg/dL 1.46*   CALCIUM mg/dL 8.8           Medications:    Current Facility-Administered Medications:     acetaminophen (TYLENOL) tablet 650 mg, 650 mg, Oral, Q6H PRN, Lalitha Israel MD    apixaban (ELIQUIS) tablet 2.5 mg, 2.5 mg, Oral, BID, Cheryle Flesher, PA-C, 2.5 mg at 10/27/23 0759    furosemide (LASIX) 500 mg infusion 50 mL, 10 mg/hr, Intravenous, Continuous, Cheryle Flesher, PA-C, Last Rate: 1 mL/hr at 10/26/23 1222, 10 mg/hr at 10/26/23 1222    hydrocortisone (ANUSOL-HC) 2.5 % rectal cream, , Topical, BID, Cheryle Flesher, PA-C, Given at 10/27/23 0759    insulin lispro (HumaLOG) 100 units/mL subcutaneous injection 1-5 Units, 1-5 Units, Subcutaneous, HS, Monie Britton MD, 3 Units at 10/26/23 2202    insulin lispro (HumaLOG) 100 units/mL subcutaneous injection 1-6 Units, 1-6 Units, Subcutaneous, TID AC, 2 Units at 10/26/23 1636 **AND** Fingerstick Glucose (POCT), , , TID AC, Monie Britton MD    lidocaine (LIDODERM) 5 % patch 1 patch, 1 patch, Topical, Daily, Cheryle Flesher, PA-C, 1 patch at 10/27/23 0800    metoprolol (LOPRESSOR) injection 2.5 mg, 2.5 mg, Intravenous, Q6H PRN, Todd Britton MD    metoprolol succinate (TOPROL-XL) 24 hr tablet 25 mg, 25 mg, Oral, BID, KRISH Toledo, 25 mg at 10/27/23 0800    oxyCODONE (ROXICODONE) IR tablet 2.5 mg, 2.5 mg, Oral, Q6H PRN, Halina Aragon PA-C    oxyCODONE (ROXICODONE) IR tablet 5 mg, 5 mg, Oral, Q6H PRN, Halina Aragon PA-C, 5 mg at 10/25/23 0911    potassium chloride (K-DUR,KLOR-CON) CR tablet 20 mEq, 20 mEq, Oral, BID, KRISH Hahn, 20 mEq at 10/27/23 0800    This note was completed in part utilizing [employer] Direct Software.   Grammatical errors, random word insertions, spelling mistakes, and incomplete sentences may be an occasional consequence of this system secondary to software limitations, ambient noise, and hardware issues. If you have any questions or concerns about the content, text, or information contained within the body of this dictation, please contact the provider for clarification.

## 2023-10-27 NOTE — ASSESSMENT & PLAN NOTE
Wt Readings from Last 3 Encounters:   10/26/23 86 kg (189 lb 9.5 oz)     Patient presents with worsening shortness of breath abdominal distention and lower extremity edema. Has no known prior history of congestive heart failure however has however has not seen a physician in 3 years and had stopped taking all his medications since February 2023  CT scan of the chest shows bilateral large pleural effusion, cardiomegaly, large ascites  Wife at bedside reports increased weight gain  Patient hypoxic requiring 3 L of supplemental oxygen via nasal cannula - has been weaned to room air  Will initiate treatment with IV furosemide 40 mg twice daily. -Patient is net -3 L today morning   Monitor daily weight and intake and output. Continue with current diuresis  Echo: Ejection fraction is 25 -29% with diffuse global hypokinesis. Noted to have moderate to severe TR and severe MR. Patient's wife reported that she talk to cardiology yesterday and cardiology gave him a very poor prognosis. -Patient is not a surgical candidate. Wife is considering hospice-we will consult hospice through case management  Cardiology: continue torsemide, metoprolol, eliquis and lisinopril on discharge.

## 2023-10-27 NOTE — ASSESSMENT & PLAN NOTE
Noted to have thrombocytopenia on labs. No evidence of active or acute bleeding. Starting on heparin gtt due to a fib history. Patient's wife agreeable to [facility] at this time.   Will monitor

## 2023-10-27 NOTE — CASE MANAGEMENT
Case Management Discharge Planning Note    Patient name Loyd Neely  Location /-88 MRN [id number]  : 1940 Date 10/27/2023       Current Admission Date: 10/20/2023  Current Admission Diagnosis:Acute combined systolic and diastolic congestive heart failure [facility])   Patient Active Problem List    Diagnosis Date Noted    Goals of care, counseling/discussion 10/22/2023    Thrombocytopenia ([address]) 10/22/2023    Abnormal head CT 10/21/2023    Acute combined systolic and diastolic congestive heart failure ([address]) 2322    Alcoholic cirrhosis of liver with ascites ([address]) 10/20/2023    Atrial fibrillation with RVR ([address])     Acute metabolic encephalopathy 7472    Stage 3 chronic kidney disease ([address]) 10/20/2023    Bladder tumor 10/20/2023    Type 2 diabetes mellitus with renal complication ([address])     Right foot ulcer ([address]) 10/20/2023      LOS (days): 7  Geometric Mean LOS (GMLOS) (days): 3.60  Days to GMLOS:-3.4     OBJECTIVE:  Risk of Unplanned Readmission Score: 12.16         Current admission status: Inpatient   Preferred Pharmacy:   [address] [place] [address]  Phone: [phone] Fax: [phone]    Primary Care Provider: No primary care provider on file. Primary Insurance: [facility]  Secondary Insurance:     DISCHARGE DETAILS:      Other Referral/Resources/Interventions Provided:  Interventions: Hospice  Referral Comments: Pt accepted by [facility] hospice. Programs[de-identified] CHF    Treatment Team Recommendation: Hospice  Discharge Destination Plan[de-identified] Hospice  Transport at Discharge : [facility]  Dispatcher Contacted: Yes  Number/Name of Dispatcher: Mary Harrington and Unit #):  Crissy  ETA of Transport (Date): 10/27/23  ETA of Transport (Time): 1    Family notified[de-identified] CM spoke with pt's wife, Isabela Parrish, via phone call and informed her of 3:15pm transport home today. Pt's wife agreeable. Additional Comments: CM confirmed with [facility] that they will be delivering all pt's DME to home between 10-12pm today. [employer] aware of transport time home at 3:15pm. Provider and nursing made aware of transport time and location.

## 2023-10-27 NOTE — PLAN OF CARE
Problem: NEUROSENSORY - ADULT  Goal: Achieves stable or improved neurological status  Description: INTERVENTIONS  - Monitor and report changes in neurological status  - Monitor vital signs such as temperature, blood pressure, glucose, and any other labs ordered   - Initiate measures to prevent increased intracranial pressure  - Monitor for seizure activity and implement precautions if appropriate      Outcome: Progressing  Goal: Achieves maximal functionality and self care  Description: INTERVENTIONS  - Monitor swallowing and airway patency with patient fatigue and changes in neurological status  - Encourage and assist patient to increase activity and self care.    - Encourage visually impaired, hearing impaired and aphasic patients to use assistive/communication devices  Outcome: Progressing     Problem: CARDIOVASCULAR - ADULT  Goal: Maintains optimal cardiac output and hemodynamic stability  Description: INTERVENTIONS:  - Monitor I/O, vital signs and rhythm  - Monitor for S/S and trends of decreased cardiac output swelling , SOB AHUMADA  - Administer and titrate ordered vasoactive medications to optimize hemodynamic stability  - Assess quality of pulses, skin color and temperature  - Assess for signs of decreased coronary artery perfusion  - Instruct patient to report change in severity of symptoms  Outcome: Progressing  Goal: Absence of cardiac dysrhythmias or at baseline rhythm  Description: INTERVENTIONS:  - Continuous cardiac monitoring, vital signs, obtain 12 lead EKG if ordered  - Administer antiarrhythmic and heart rate control medications as ordered  - Monitor electrolytes and administer replacement therapy as ordered  Outcome: Progressing     Problem: RESPIRATORY - ADULT  Goal: Achieves optimal ventilation and oxygenation  Description: INTERVENTIONS:  - Assess for changes in respiratory status  - Assess for changes in mentation and behavior  - Position to facilitate oxygenation and minimize respiratory effort  - Oxygen administered by appropriate delivery if ordered  - Initiate smoking cessation education as indicated  - Encourage broncho-pulmonary hygiene including cough, deep breathe, Incentive Spirometry  - Assess the need for suctioning and aspirate as needed  - Assess and instruct to report SOB or any respiratory difficulty  - Respiratory Therapy support as indicated  Outcome: Progressing     Problem: GENITOURINARY - ADULT  Goal: Maintains or returns to baseline urinary function  Description: INTERVENTIONS:  - Assess urinary function  - Encourage oral fluids to ensure adequate hydration if ordered  - Administer IV fluids as ordered to ensure adequate hydration  - Administer ordered medications as needed  - Offer frequent toileting  - Follow urinary retention protocol if ordered  Outcome: Progressing  Goal: Absence of urinary retention  Description: INTERVENTIONS:  - Assess patient’s ability to void and empty bladder  - Monitor I/O  - Bladder scan as needed  - Discuss with physician/AP medications to alleviate retention as needed  - Discuss catheterization for long term situations as appropriate  Outcome: Progressing  Goal: Urinary catheter remains patent  Description: INTERVENTIONS:  - Assess patency of urinary catheter  - If patient has a chronic brian, consider changing catheter if non-functioning  - Follow guidelines for intermittent irrigation of non-functioning urinary catheter  Outcome: Progressing

## 2023-10-27 NOTE — NURSING NOTE
AVS reviewed with patient. Patient verbalized understanding. Will call wife to discuss discharge instructions and brian care.

## 2023-10-27 NOTE — PROGRESS NOTES
Spoke to patient's wife regarding AVS, stated at this time she has no questions regarding dc instructions or brian care, but will call if she does.

## 2023-10-27 NOTE — DISCHARGE SUMMARY
[address] [facility]  Discharge- [facility] 1940, 78 y.o. male MRN: [id number]  Unit/Bed#: -Wally Encounter: [id number]  Primary Care Provider: No primary care provider on file. Date and time admitted to hospital: 10/20/2023  9:02 AM    * Acute combined systolic and diastolic congestive heart failure (HCC)  Assessment & Plan  Wt Readings from Last 3 Encounters:   10/26/23 86 kg (189 lb 9.5 oz)     Patient presents with worsening shortness of breath abdominal distention and lower extremity edema. Has no known prior history of congestive heart failure however has however has not seen a physician in 3 years and had stopped taking all his medications since February 2023  CT scan of the chest shows bilateral large pleural effusion, cardiomegaly, large ascites  Wife at bedside reports increased weight gain  Patient hypoxic requiring 3 L of supplemental oxygen via nasal cannula - has been weaned to room air  Will initiate treatment with IV furosemide 40 mg twice daily. -Patient is net -3 L today morning   Monitor daily weight and intake and output. Continue with current diuresis  Echo: Ejection fraction is 25 -29% with diffuse global hypokinesis. Noted to have moderate to severe TR and severe MR. Patient's wife reported that she talk to cardiology yesterday and cardiology gave him a very poor prognosis. -Patient is not a surgical candidate. Wife is considering hospice-we will consult hospice through case management  Cardiology: continue torsemide, metoprolol, eliquis and lisinopril on discharge. Atrial fibrillation with RVR ([address])  Assessment & Plan  Present admission with heart rate in the 120s. Received 1 dose of IV Cardizem in the emergency room. Subsequently heart rate varying between 100-1 10. Continue with telemetry monitoring  Initiate Lopressor 25mg twice daily  [id number] score 4  Discussed with wife about anticoagulation-plan was for anticoagulation.    CT head is concerning for tiny subdural hemorrhage. Anticoagulation was on hold. But on repeat imaging it remained stable. Case was discussed with neurosurgery physician assistant. Cleared for anticoagulation if medically necessary. Discussed with the wife. Wife reported that she would like to hold off on anticoagulation. She reported that she talked to cardiology and cardiology gave her very poor prognosis for him and he is not a surgical candidate. He understand his prognosis and would like to hold anticoagulation for now. Risk and benefit of anticoagulation versus known anticoagulation was explained to the wife- wife, daughter, and son are ok with heparin while inpatient, initially were not interested in [facility] after discharge, but family thought more on this and would like to do eliquis on discharge for prevention of stroke. They understand the risk of bleeding associated and would like to proceed. Eliquis 2.5mg bid sent to pharmacy for price check - $10, patient's wife agreeable to this cost  Heparin gtt d/c and eliquis 2.5 mg bid started. Goals of care, counseling/discussion  Assessment & Plan  Patient's spouse Cody Bansal, daughter, and son present at bedside and discussing goals of care with patient. She states that he would not want to be living like this and would like patient to go home on hospice when as stable as possible. Would like to try and get the fluid off him and then have him come back home. Does not want any extensive measures taking or any procedures. Does not want IV antibiotics or treatment of osteomyelitis. Discussed that patient can progress to sepsis, septic shock, have limb loss, and death sooner due to not treating active infection, patient's wife verbalized understanding stating "he has had this toe wound for so long and was likely infected the whole time, but we don't want any antibiotics". She does not want podiatry consult either.    She would like patient to  be comfortable, biggest concern is getting more of the fluid off him so he can be comfortable for discharge, but would not want anything further after discharge from the hospital. Tim Medina with heparin at this time to prevent stroke while inpatient. Discussed with cardiology at bedside regarding patient's anticoagulation status - would like to be discharged with anticoagulation at this time. Would not want patient having a stroke. Eliquis 2.5 mg bid sent to pharmacy for price check. Will consult CM for hospice assistance for when patient stable for discharge. Would like to continue brian on discharge   POLST complete and in patient's chart  See acp note    Thrombocytopenia [facility])  Assessment & Plan  Noted to have thrombocytopenia on labs. No evidence of active or acute bleeding. Starting on heparin gtt due to a fib history. Patient's wife agreeable to [facility] at this time. Will monitor     Abnormal head CT  Assessment & Plan  Patient had a CAT scan done as part of his work-up for encephalopathy. Noted to have High dense focus along the right falx -dural calcification or tiny subdural hemorrhage . Repeat CAT scan showed stability. Discussed with neurosurgery. Denies any trauma   Patient's family agreeable to anticoagulation at this time with heparin    Right foot ulcer ([address])  Assessment & Plan  Has chronic ulcer right foot. Has not seen a podiatrist or undergone any imaging. Follow-up on lower extremity arterial Doppler study as patient does have bilateral lower extremity edema and diminished pulses. No DVT on Doppler  XR right foot: Erosive changes of the head of the second proximal phalanx and base of the fifth middle phalanx appearing in the area of patient's wound consistent with septic arthropathy and osteomyelitis. Arterial duplex and MRI initially ordered, d/c as patient's wife, daughter, and son would not want any treatment of infection of the foot with IV antibiotics or amputation.    Discussed with patient's spouse, son, and daughter - they would NOT want patient on antibiotics and would not want any amputation. They are planning on doing hospice on discharge. Family does not want evaluation by podiatry at this time as it will not change their decision in being interested in comfort for patient. Only wanting to continue with lasix gtt at this time. Discussed that if patient does have infection of the bone, it is important to start IV antibiotics to prevent sepsis, septic shock, loss of limb, death - patient's wife, daughter, and son verbalized understanding of not starting antibiotics and all agreeable to not start at this time. 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 However he has been off all his medications since February 2023. Follow-up on hemoglobin A1c and start sliding scale insulin. Bladder tumor  Assessment & Plan  Bladder tumor s/pTURBT in 2020. Has not subsequently followed up with urologist.  Found to have urinary retention. Brian catheter placed. Urinalysis with microscopy negative for hematuria. Family would like to rediscuss brian catheter closer to discharge if they would like him leaving with this or removal prior to discharge. Stage 3 chronic kidney disease [facility])  Assessment & Plan  Lab Results   Component Value Date    EGFR 43 10/27/2023    EGFR 44 10/26/2023    EGFR 41 10/25/2023    CREATININE 1.46 (H) 10/27/2023    CREATININE 1.43 (H) 10/26/2023    CREATININE 1.51 (H) 10/25/2023   Likely secondary to diabetic nephropathy. Baseline creatinine 1.6. Creatinine 1.88 on the day of admission.    Patient with urinary retention. Status post Melchor catheter placed  Monitor BID BMP with lasix gtt. Acute metabolic encephalopathy  Assessment & Plan  Wife reports worsening confusion over the last several weeks  No asterixis noted. Neuro exam nonfocal  Likely multifactorial in the setting of hypoxia related to heart failure. However in view of new onset atrial fibrillation cannot rule out acute CVA or hyperammonemia in the setting of cirrhosis seen on imaging. Continue with serial neurochecks  CT head reviewed. Questionable small tiny subdural versus dural fold on the CT. Discussed with neurosurgery. Stable for anticoagulation since the repeat CAT scan is stable. Alcoholic cirrhosis of liver with ascites [facility])  Assessment & Plan  Seen on CT scan of the abdomen. Associated with large ascites. Will need paracentesis. 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 Follow-up on abdominal ultrasound: Abnormal liver compatible with hepatic cirrhosis. No evidence of liver mass. Moderate ascites. Bilateral renal atrophy. No hydronephrosis or perinephric collection. Cholelithiasis without evidence of acute cholecystitis. Wife reported some confusion. Follow-up on ammonia-ammonia and chronic hepatitis panel is negative    Acute respiratory failure with hypoxia (HCC)-resolved as of 10/23/2023  Assessment & Plan  On admission with oxygen saturation 85% on room air at rest.  Currently on 3 L of supplemental oxygen via nasal cannula. Wean and titrate as tolerated to keep saturation between 88 to 94%. Likely in the setting of underlying congestive heart failure with large bilateral pleural effusions. Patient is currently on room air. Discussed with critical care. Hold on thoracocentesis for now. Monitor respiratory status  Remains on room air, started on lasix gtt.          Medical Problems       Resolved Problems  Date Reviewed: 10/27/2023            Resolved    Acute respiratory failure with hypoxia ([address]) 10/23/2023     Resolved by  Rain Johnson PA-C        Discharging Physician / Practitioner: Nida Dodson PA-C  PCP: No primary care provider on file. Admission Date:   Admission Orders (From admission, onward)       Ordered        10/20/23 [facility]  Once                          Discharge Date: 10/27/23    Consultations During Hospital Stay:  Cardiology     Procedures Performed:   None    Significant Findings / Test Results:   Chest x-ray showing low lung volumes with vascular crowding. Question pulmonary venous congestion with small effusions and probable left base atelectasis. Question right midlung nodule. Recommend further evaluation with PA and lateral chest radiographs with dual-energy subtraction when the patient is able. CTA chest showing no pulmonary embolism. Cardiomegaly with moderate to large bilateral pleural effusions. Reflux of contrast into the inferior vena cava and portal veins, suggestive of right heart dysfunction. Cirrhosis with sequelae of portal hypertension including moderate to large abdominal pelvic ascites. No suspicious hepatic mass identified. Diffuse anasarca and asymmetric left chest wall edema  Right foot x-ray showing erosive changes of the head of the second proximal phalanx and base of the fifth middle phalanx appearing in the area of the patient's wound consistent with septic arthropathy and osteomyelitis  CT head showing tiny hyperdense focus along the right frontal falx. There is no prior imaging available for comparison. Cannot exclude small subdural hemorrhage  Repeat CT head showing high dense focus along the right falx unchanged from prior potentially dural calcification or tiny subdural hemorrhage unchanged from prior  CXR showing borderline cardiomegaly. Persistent decreased slight vascular congestion. Small pleural effusions  US abdomen showing abnormal liver compatible with hepatic cirrhosis.  No evidence of liver mass. Moderate ascites. Bilateral renal atrophy. No hydronephrosis or perinephric collection. Cholelithiasis without evidence of acute cholecystisis    Incidental Findings:   No follow up needed due to being discharged on hospice     Test Results Pending at Discharge (will require follow up): None     Outpatient Tests Requested:  None    Complications:  None    Reason for Admission: acute on chronic CHF    Hospital Course:   Caron Fish is a 78 y.o. male patient who originally presented to the hospital on 10/20/2023 due to AMS and shortness of breath. Patient was found to have acute CHF exacerbation and osteomyelitis of right second toe. At that time, family requested to just treat the heart failure and not the osteomyelitis, as they would like to transition to hospice on discharge. Cardiology consulted and treated patient with lasix gtt. Treated with metoprolol and eliquis due to a fib, and family agreeable to continue these, as well as lisinopril and torsemide on discharge to prevent fluid re accumulation. Patient being discharged on hospice with brian in place per family and patient wishes. Please see above list of diagnoses and related plan for additional information. Condition at Discharge: fair    Discharge Day Visit / Exam:   Subjective:  Patient states that he is feeling much better today and feels clearer. Very excited to be going home. Does not offer any complaints at this time. Vitals: Blood Pressure: 100/60 (10/27/23 0705)  Pulse: 93 (10/27/23 0705)  Temperature: 97.5 °F (36.4 °C) (10/27/23 0705)  Temp Source: Temporal (10/25/23 1508)  Respirations: 18 (10/27/23 0705)  Height: 6' 3" (190.5 cm) (10/20/23 1338)  Weight - Scale: 86 kg (189 lb 9.5 oz) (10/26/23 0536)  SpO2: 90 % (10/27/23 0705)  Exam:   Physical Exam  Vitals reviewed. Constitutional:       General: He is not in acute distress. Appearance: Normal appearance. He is not ill-appearing.    HENT:      Head: Normocephalic and atraumatic. Nose: Nose normal.      Mouth/Throat:      Mouth: Mucous membranes are moist.      Pharynx: Oropharynx is clear. Eyes:      Extraocular Movements: Extraocular movements intact. Conjunctiva/sclera: Conjunctivae normal.   Cardiovascular:      Rate and Rhythm: Normal rate. Rhythm irregular. Pulses: Normal pulses. Heart sounds: Murmur heard. Pulmonary:      Effort: Pulmonary effort is normal. No respiratory distress. Breath sounds: Normal breath sounds. No wheezing. Abdominal:      General: Abdomen is flat. Bowel sounds are normal. There is no distension. Palpations: Abdomen is soft. Tenderness: There is no abdominal tenderness. There is no guarding. Musculoskeletal:         General: Normal range of motion. Cervical back: Normal range of motion. Right lower leg: No edema. Left lower leg: No edema. Skin:     General: Skin is warm. Findings: Lesion present. Comments: Wound on right second toe   Neurological:      General: No focal deficit present. Mental Status: He is alert. Mental status is at baseline. Motor: No weakness. Psychiatric:         Mood and Affect: Mood normal.         Behavior: Behavior normal.         Thought Content: Thought content normal.         Judgment: Judgment normal.          Discussion with Family: Updated  (wife) via phone. Discharge instructions/Information to patient and family:   See after visit summary for information provided to patient and family. Provisions for Follow-Up Care:  See after visit summary for information related to follow-up care and any pertinent home health orders. Disposition:   Home with [facility]NA Services (Reminder: Complete face to face encounter)    Planned Readmission: None     Discharge Statement:  I spent 45 minutes discharging the patient. This time was spent on the day of discharge. I had direct contact with the patient on the day of discharge. Greater than 50% of the total time was spent examining patient, answering all patient questions, arranging and discussing plan of care with patient as well as directly providing post-discharge instructions. Additional time then spent on discharge activities. Discharge Medications:  See after visit summary for reconciled discharge medications provided to patient and/or family.       **Please Note: This note may have been constructed using a voice recognition system**

## 2023-10-27 NOTE — ASSESSMENT & PLAN NOTE
Present admission with heart rate in the 120s. Received 1 dose of IV Cardizem in the emergency room. Subsequently heart rate varying between 100-1 10. Continue with telemetry monitoring  Initiate Lopressor 25mg twice daily  [id number] score 4  Discussed with wife about anticoagulation-plan was for anticoagulation. CT head is concerning for tiny subdural hemorrhage. Anticoagulation was on hold. But on repeat imaging it remained stable. Case was discussed with neurosurgery physician assistant. Cleared for anticoagulation if medically necessary. Discussed with the wife. Wife reported that she would like to hold off on anticoagulation. She reported that she talked to cardiology and cardiology gave her very poor prognosis for him and he is not a surgical candidate. He understand his prognosis and would like to hold anticoagulation for now. Risk and benefit of anticoagulation versus known anticoagulation was explained to the wife- wife, daughter, and son are ok with heparin while inpatient, initially were not interested in [facility] after discharge, but family thought more on this and would like to do eliquis on discharge for prevention of stroke. They understand the risk of bleeding associated and would like to proceed. Eliquis 2.5mg bid sent to pharmacy for price check - $10, patient's wife agreeable to this cost  Heparin gtt d/c and eliquis 2.5 mg bid started.

## 2023-10-27 NOTE — ASSESSMENT & PLAN NOTE
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 However he has been off all his medications since February 2023. Follow-up on hemoglobin A1c and start sliding scale insulin.

## 2023-10-27 NOTE — ASSESSMENT & PLAN NOTE
Lab Results   Component Value Date    EGFR 43 10/27/2023    EGFR 44 10/26/2023    EGFR 41 10/25/2023    CREATININE 1.46 (H) 10/27/2023    CREATININE 1.43 (H) 10/26/2023    CREATININE 1.51 (H) 10/25/2023   Likely secondary to diabetic nephropathy. Baseline creatinine 1.6. Creatinine 1.88 on the day of admission. Patient with urinary retention. Status post Melchor catheter placed  Monitor BID BMP with lasix gtt.

## 2023-10-30 NOTE — UTILIZATION REVIEW
NOTIFICATION OF ADMISSION DISCHARGE   This is a Notification of Discharge from [address] [place], [place] [address]. Please be advised that this patient has been discharge from our facility. Below you will find the admission and discharge date and time including the patient’s disposition. UTILIZATION REVIEW CONTACT:  Shimon Burden  Utilization   Network Utilization Review Department  Phone: [phone] x carefully listen to the prompts. All voicemails are confidential.  Email: [email or web address]     ADMISSION INFORMATION  PRESENTATION DATE: 10/20/2023  9:02 AM  OBERVATION ADMISSION DATE:   INPATIENT ADMISSION DATE: 10/20/23 11:13 AM   DISCHARGE DATE: 10/27/2023  3:30 PM   DISPOSITION:Home with Hospice Care    Network Utilization Review Department  ATTENTION: Please call with any questions or concerns to [phone] and carefully listen to the prompts so that you are directed to the right person. All voicemails are confidential.   For Discharge needs, contact Care Management DC Support Team at [phone] opt. 2  Send all requests for admission clinical reviews, approved or denied determinations and any other requests to dedicated fax number below belonging to the campus where the patient is receiving treatment.  List of dedicated fax numbers for the Facilities:  [place] DENIALS (Administrative/Medical Necessity) [phone]   DISCHARGE SUPPORT TEAM (Network) [phone]   [facility] (Maternity/NICU/Pediatrics) [phone]   [address] [facility] [address] [place] [address] [place] [address] [phone]   [address] [place] [address]  [facility] [phone]